# Patient Record
Sex: FEMALE | Race: WHITE | NOT HISPANIC OR LATINO | Employment: FULL TIME | URBAN - METROPOLITAN AREA
[De-identification: names, ages, dates, MRNs, and addresses within clinical notes are randomized per-mention and may not be internally consistent; named-entity substitution may affect disease eponyms.]

---

## 2017-09-12 ENCOUNTER — TRANSCRIBE ORDERS (OUTPATIENT)
Dept: ADMINISTRATIVE | Facility: HOSPITAL | Age: 52
End: 2017-09-12

## 2017-09-12 DIAGNOSIS — Z12.31 VISIT FOR SCREENING MAMMOGRAM: Primary | ICD-10-CM

## 2017-12-04 ENCOUNTER — HOSPITAL ENCOUNTER (OUTPATIENT)
Dept: MAMMOGRAPHY | Facility: HOSPITAL | Age: 52
Discharge: HOME | End: 2017-12-04
Attending: OBSTETRICS & GYNECOLOGY | Admitting: OBSTETRICS & GYNECOLOGY

## 2017-12-04 ENCOUNTER — APPOINTMENT (OUTPATIENT)
Dept: MAMMOGRAPHY | Facility: HOSPITAL | Age: 52
End: 2017-12-04
Attending: OBSTETRICS & GYNECOLOGY

## 2017-12-04 DIAGNOSIS — Z12.31 VISIT FOR SCREENING MAMMOGRAM: ICD-10-CM

## 2017-12-04 PROCEDURE — 77063 BREAST TOMOSYNTHESIS BI: CPT

## 2017-12-04 PROCEDURE — G0202 SCR MAMMO BI INCL CAD: HCPCS

## 2017-12-04 PROCEDURE — 77067 SCR MAMMO BI INCL CAD: CPT | Performed by: RADIOLOGY

## 2017-12-04 PROCEDURE — 77063 BREAST TOMOSYNTHESIS BI: CPT | Performed by: RADIOLOGY

## 2018-09-17 ENCOUNTER — PREP FOR SURGERY (OUTPATIENT)
Dept: OTHER | Facility: HOSPITAL | Age: 53
End: 2018-09-17

## 2018-09-17 DIAGNOSIS — M16.11 PRIMARY OSTEOARTHRITIS OF RIGHT HIP: Primary | ICD-10-CM

## 2018-09-17 RX ORDER — OXYCODONE HCL 10 MG/1
10 TABLET, FILM COATED, EXTENDED RELEASE ORAL ONCE
Status: CANCELLED | OUTPATIENT
Start: 2018-10-19 | End: 2018-10-19

## 2018-09-17 RX ORDER — ACETAMINOPHEN 500 MG
1000 TABLET ORAL ONCE
Status: CANCELLED | OUTPATIENT
Start: 2018-10-19 | End: 2018-10-19

## 2018-09-17 RX ORDER — CEFAZOLIN SODIUM 2 G/100ML
2 INJECTION, SOLUTION INTRAVENOUS ONCE
Status: CANCELLED | OUTPATIENT
Start: 2018-10-19 | End: 2018-10-19

## 2018-10-04 ENCOUNTER — HOSPITAL ENCOUNTER (OUTPATIENT)
Dept: GENERAL RADIOLOGY | Facility: HOSPITAL | Age: 53
End: 2018-10-04

## 2018-10-04 ENCOUNTER — HOSPITAL ENCOUNTER (OUTPATIENT)
Dept: GENERAL RADIOLOGY | Facility: HOSPITAL | Age: 53
Discharge: HOME | End: 2018-10-04
Admitting: ORTHOPAEDIC SURGERY

## 2018-10-04 ENCOUNTER — APPOINTMENT (OUTPATIENT)
Dept: PREADMISSION TESTING | Facility: HOSPITAL | Age: 53
End: 2018-10-04

## 2018-10-04 VITALS
BODY MASS INDEX: 37.51 KG/M2 | TEMPERATURE: 98.3 F | RESPIRATION RATE: 18 BRPM | WEIGHT: 239 LBS | SYSTOLIC BLOOD PRESSURE: 132 MMHG | OXYGEN SATURATION: 98 % | HEART RATE: 73 BPM | DIASTOLIC BLOOD PRESSURE: 81 MMHG | HEIGHT: 67 IN

## 2018-10-04 DIAGNOSIS — M16.11 PRIMARY OSTEOARTHRITIS OF RIGHT HIP: ICD-10-CM

## 2018-10-04 LAB
APTT PPP: 28.1 SECONDS (ref 22.7–35.4)
BACTERIA UR QL AUTO: NORMAL /HPF
BILIRUB UR QL STRIP: NEGATIVE
CLARITY UR: CLEAR
COLOR UR: YELLOW
GLUCOSE UR STRIP-MCNC: NEGATIVE MG/DL
HBA1C MFR BLD: 5.2 % (ref 4.8–5.6)
HGB UR QL STRIP.AUTO: NEGATIVE
HYALINE CASTS UR QL AUTO: NORMAL /LPF
INR PPP: 0.96 (ref 0.9–1.1)
KETONES UR QL STRIP: NEGATIVE
LEUKOCYTE ESTERASE UR QL STRIP.AUTO: ABNORMAL
NITRITE UR QL STRIP: NEGATIVE
PH UR STRIP.AUTO: 5.5 [PH] (ref 5–8)
PROT UR QL STRIP: NEGATIVE
PROTHROMBIN TIME: 12.6 SECONDS (ref 11.7–14.2)
RBC # UR: NORMAL /HPF
REF LAB TEST METHOD: NORMAL
SP GR UR STRIP: 1.02 (ref 1–1.03)
SQUAMOUS #/AREA URNS HPF: NORMAL /HPF
UROBILINOGEN UR QL STRIP: ABNORMAL
WBC UR QL AUTO: NORMAL /HPF

## 2018-10-04 PROCEDURE — 36415 COLL VENOUS BLD VENIPUNCTURE: CPT

## 2018-10-04 PROCEDURE — 85730 THROMBOPLASTIN TIME PARTIAL: CPT | Performed by: ORTHOPAEDIC SURGERY

## 2018-10-04 PROCEDURE — 83036 HEMOGLOBIN GLYCOSYLATED A1C: CPT | Performed by: ORTHOPAEDIC SURGERY

## 2018-10-04 PROCEDURE — 85610 PROTHROMBIN TIME: CPT | Performed by: ORTHOPAEDIC SURGERY

## 2018-10-04 PROCEDURE — 81001 URINALYSIS AUTO W/SCOPE: CPT | Performed by: ORTHOPAEDIC SURGERY

## 2018-10-04 PROCEDURE — 73502 X-RAY EXAM HIP UNI 2-3 VIEWS: CPT

## 2018-10-04 RX ORDER — CELECOXIB 200 MG/1
200 CAPSULE ORAL 2 TIMES DAILY
COMMUNITY
End: 2021-10-18

## 2018-10-04 RX ORDER — TRAMADOL HYDROCHLORIDE 50 MG/1
50 TABLET ORAL EVERY 6 HOURS PRN
COMMUNITY
End: 2022-03-11

## 2018-10-04 RX ORDER — CHLORHEXIDINE GLUCONATE 500 MG/1
CLOTH TOPICAL
Status: ON HOLD | COMMUNITY
End: 2018-10-19

## 2018-10-04 RX ORDER — IBUPROFEN 200 MG
200 TABLET ORAL EVERY 6 HOURS PRN
COMMUNITY
End: 2018-10-20 | Stop reason: HOSPADM

## 2018-10-04 ASSESSMENT — HOOS JR
HOOS JR SCORE: 10
HOOS JR SCORE: 58.93

## 2018-10-04 NOTE — DISCHARGE INSTRUCTIONS
Take the following medications the morning of surgery with a small sip of water:    NONE    CALL OFFICE TO CONFIRM TIME OF ARRIVAL       General Instructions:  • Do not eat solid food after midnight the night before surgery.  • You may drink clear liquids day of surgery but must stop at least one hour before your hospital arrival time.  • It is beneficial for you to have a clear drink that contains carbohydrates the day of surgery.  We suggest a 12 to 20 ounce bottle of Gatorade or Powerade for non-diabetic patients or a 12 to 20 ounce bottle of G2 or Powerade Zero for diabetic patients. (Pediatric patients, are not advised to drink a 12 to 20 ounce carbohydrate drink)    Clear liquids are liquids you can see through.  Nothing red in color.     Plain water                               Sports drinks  Sodas                                   Gelatin (Jell-O)  Fruit juices without pulp such as white grape juice and apple juice  Popsicles that contain no fruit or yogurt  Tea or coffee (no cream or milk added)  Gatorade / Powerade  G2 / Powerade Zero    • Infants may have breast milk up to four hours before surgery.  • Infants drinking formula may drink formula up to six hours before surgery.   • Patients who avoid smoking, chewing tobacco and alcohol for 4 weeks prior to surgery have a reduced risk of post-operative complications.  Quit smoking as many days before surgery as you can.  • Do not smoke, use chewing tobacco or drink alcohol the day of surgery.   • If applicable bring your C-PAP/ BI-PAP machine.  • Bring any papers given to you in the doctor’s office.  • Wear clean comfortable clothes and socks.  • Do not wear contact lenses or make-up.  Bring a case for your glasses.   • Bring crutches or walker if applicable.  • Remove all piercings.  Leave jewelry and any other valuables at home.  • Hair extensions with metal clips must be removed prior to surgery.  • The Pre-Admission Testing nurse will instruct you to  bring medications if unable to obtain an accurate list in Pre-Admission Testing.        If you were given a blood bank ID arm band remember to bring it with you the day of surgery.    Preventing a Surgical Site Infection:  • For 2 to 3 days before surgery, avoid shaving with a razor because the razor can irritate skin and make it easier to develop an infection.    • Any areas of open skin can increase the risk of a post-operative wound infection by allowing bacteria to enter and travel throughout the body.  Notify your surgeon if you have any skin wounds / rashes even if it is not near the expected surgical site.  The area will need assessed to determine if surgery should be delayed until it is healed.  • The night prior to surgery sleep in a clean bed with clean clothing.  Do not allow pets to sleep with you.  • Shower on the morning of surgery using a fresh bar of anti-bacterial soap (such as Dial) and clean washcloth.  Dry with a clean towel and dress in clean clothing.  • Ask your surgeon if you will be receiving antibiotics prior to surgery.  • Make sure you, your family, and all healthcare providers clean their hands with soap and water or an alcohol based hand  before caring for you or your wound.    Day of surgery:  Upon arrival, a Pre-op nurse and Anesthesiologist will review your health history, obtain vital signs, and answer questions you may have.  The only belongings needed at this time will be your home medications and if applicable your C-PAP/BI-PAP machine.  If you are staying overnight your family can leave the rest of your belongings in the car and bring them to your room later.  A Pre-op nurse will start an IV and you may receive medication in preparation for surgery, including something to help you relax.  Your family will be able to see you in the Pre-op area.  While you are in surgery your family should notify the waiting room  if they leave the waiting room area and  provide a contact phone number.    Please be aware that surgery does come with discomfort.  We want to make every effort to control your discomfort so please discuss any uncontrolled symptoms with your nurse.   Your doctor will most likely have prescribed pain medications.      If you are going home after surgery you will receive individualized written care instructions before being discharged.  A responsible adult must drive you to and from the hospital on the day of your surgery and stay with you for 24 hours.    If you are staying overnight following surgery, you will be transported to your hospital room following the recovery period.  [facility] has all private rooms.    You have received a list of surgical assistants for your reference.  If you have any questions please call Pre-Admission Testing at [phone].  Deductibles and co-payments are collected on the day of service. Please be prepared to pay the required co-pay, deductible or deposit on the day of service as defined by your plan.    2% CHLORAHEXIDINE GLUCONATE* CLOTH  Preparing or “prepping” skin before surgery can reduce the risk of infection at the surgical site. To make the process easier, [facility] has chosen disposable cloths moistened with a rinse-free, 2% Chlorhexidine Gluconate (CHG) antiseptic solution. The steps below outline the prepping process and should be carefully followed.        Use the prep cloth on the area that is circled in the diagram             Directions Night before Surgery  1) Shower using a fresh bar of anti-bacterial soap (such as Dial) and clean washcloth.  Use a clean towel to completely dry your skin.  2) Do not use any lotions, oils or creams on your skin.  3) Open the package and remove 1 cloth, wipe your skin for 30 seconds in a circular motion.  Allow to dry for 3 minutes.  4) Repeat #3 with second cloth.  5) Do not touch your eyes, ears, or mouth with the prep cloth.  6) Allow the wet  prep solution to air dry.  7) Discard the prep cloth and wash your hands with soap and water.   8) Dress in clean bed clothes and sleep on fresh clean bed sheets.   9) You may experience some temporary itching after the prep.    Directions Day of Surgery  1) Repeat steps 1,2,3,4,5,6,7, and 9.   2) Dress in clean clothes before coming to the hospital.    BACTROBAN NASAL OINTMENT  There are many germs normally in your nose. Bactroban is an ointment that will help reduce these germs. Please follow these instructions for Bactroban use:      ____The day before surgery in the morning  Date________    ____The day before surgery in the evening              Date________    ____The day of surgery in the morning    Date________    **Squirt ½ package of Bactroban Ointment onto a cotton applicator and apply to inside of 1st nostril.  Squirt the remaining Bactroban and apply to the inside of the other nostril.    PERIDEX- ORAL:  Use only if your surgeon has ordered  Use the night before and morning of surgery - Swish, gargle, and spit - do not swallow.

## 2018-10-05 NOTE — PROGRESS NOTES
Pre-Operative Orthopedic Assessment      Patient: Grace Jacob    YOB: 1965      Age/Gender: 53 y.o. female  Medical Record Number: [id number]  Surgical Procedure Planned: RT TOTAL HIP ARTHROPLASTY ANTERIOR WITH HANA TABLE     Surgeon: Mary Be MD    Date of Surgery Planned: 10/19/2018    Question Value Score    Patient Score   1. What is your age group? 50-65 years  66-75 years  >75 years =2  =1  =0 2   2. Gender? Male  Female =2  =1 1   3. How far on average can you walk? (a block is 200 meters) Two blocks or more (+\- rest)  1-2 blocks (+\- rest)  Housebound (most of time) =2  =1  =0 2   4. Which gait aid to you use? (more often than not) None  Single-Point Stick  Crutches/Frame =2  =1  =0 2   5. Do you use community  supports? (home help, meals on wheels, district nursing) None or one per week  Two or more per week =1  =0 1   6. Will you live with someone who can care for you after your operation? Yes  No =3  =0 3      Your Score (out of 12)  11     Key Destination at Discharge from acute care predicted by score   Scores < 6  -- extended inpatient rehabilitation   Scores 6-9 -- additional intervention to discharge directly home (Rehabilitation in home)   Scores > 9 -- directly home         Discharge Disposition/Planning:     Patient Response   Discussed the Predicted discharge disposition needed based on RAPT Assessment with the patient.    yes   Patient selected discharge disposition:   Home with OP therapy   Out Patient Rehabilitation Facility of Choice:    Either [facility] or [facility] Preferred:   undecided   Has the patient completed or been scheduled to complete pre-operative testing? Yes, 10/4   Post-Operative Care Giver Name and Phone Number:    Spouse Ant Jacob, [phone]     Subacute Inpatient Rehabilitation:  Complete this section only if planning inpatient services at a Subacute Facility     Patient Response    Subacute Facility Preferred (Please list 2 facilities:      Requires pre-certification for inpatient rehabilitation services?         Planned source of transportation to inpatient rehabilitation facility?       If choosing inpatient services at an Acute or Subacute Facility please list a subsequent back-up plan (in case patient fails to qualify for inpatient rehabilitation). Back-up plans should include caregiver (family member or friend) for first 24-48 post- -operatively.       Home Equipment Patient Response   Does patient have a walker for home use?    yes   Does patient have a 3 in 1 commode for home use?    no   Does patient have a shower chair for home use?    no   Does patient have an elevated commode seat for home use? no   Does patient have a cane for home use?    yes   Is there any other medical equipment in the home? If so,  List in comment section below no   Pre-Operative Class Attendance Patient Response   Attended or scheduled to attend the pre-operative class within 1 year of total joint replacement? Yes, 10/4   Not planning to attend the pre-operative class (see comments below)    n/a   Patient Education  Completed   Expected time of discharge discussed yes   Encouraged to attend Pre-Operative Class    n/a   Education re: Back-up plan for patients planning discharge to subacute facilities n/a   Education re: Predicted Discharge Disposition based on RAPT score    yes   Patient receptive and voiced understanding of information given    yes                                                                                                            Comments:    Address verified.  Pt would like a 3-in-1 ordered please.  Pt has 5 steps with one rail into her home.                                       Signature: Rehana Pedro RN    Date:  10/5/2018

## 2018-10-15 ENCOUNTER — TRANSCRIBE ORDERS (OUTPATIENT)
Dept: ADMINISTRATIVE | Facility: HOSPITAL | Age: 53
End: 2018-10-15

## 2018-10-15 DIAGNOSIS — N64.52 NIPPLE DISCHARGE IN FEMALE: Primary | ICD-10-CM

## 2018-10-16 ENCOUNTER — HOSPITAL ENCOUNTER (OUTPATIENT)
Dept: MAMMOGRAPHY | Facility: HOSPITAL | Age: 53
Discharge: HOME | End: 2018-10-16
Attending: OBSTETRICS & GYNECOLOGY | Admitting: OBSTETRICS & GYNECOLOGY

## 2018-10-16 ENCOUNTER — HOSPITAL ENCOUNTER (OUTPATIENT)
Dept: ULTRASOUND IMAGING | Facility: HOSPITAL | Age: 53
Discharge: HOME | End: 2018-10-16

## 2018-10-16 DIAGNOSIS — N64.52 NIPPLE DISCHARGE IN FEMALE: ICD-10-CM

## 2018-10-16 PROCEDURE — G0279 TOMOSYNTHESIS, MAMMO: HCPCS

## 2018-10-16 PROCEDURE — 77065 DX MAMMO INCL CAD UNI: CPT | Performed by: RADIOLOGY

## 2018-10-16 PROCEDURE — 77061 BREAST TOMOSYNTHESIS UNI: CPT | Performed by: RADIOLOGY

## 2018-10-16 PROCEDURE — 76642 ULTRASOUND BREAST LIMITED: CPT | Performed by: RADIOLOGY

## 2018-10-16 PROCEDURE — 76642 ULTRASOUND BREAST LIMITED: CPT

## 2018-10-16 PROCEDURE — 77065 DX MAMMO INCL CAD UNI: CPT

## 2018-10-19 ENCOUNTER — ANESTHESIA EVENT (OUTPATIENT)
Dept: PERIOP | Facility: HOSPITAL | Age: 53
End: 2018-10-19

## 2018-10-19 ENCOUNTER — HOSPITAL ENCOUNTER (INPATIENT)
Facility: HOSPITAL | Age: 53
LOS: 1 days | Discharge: HOME HEALTH | End: 2018-10-20
Attending: ORTHOPAEDIC SURGERY | Admitting: ORTHOPAEDIC SURGERY

## 2018-10-19 ENCOUNTER — APPOINTMENT (OUTPATIENT)
Dept: GENERAL RADIOLOGY | Facility: HOSPITAL | Age: 53
End: 2018-10-19

## 2018-10-19 ENCOUNTER — ANESTHESIA (OUTPATIENT)
Dept: PERIOP | Facility: HOSPITAL | Age: 53
End: 2018-10-19

## 2018-10-19 DIAGNOSIS — R26.2 DIFFICULTY WALKING: Primary | ICD-10-CM

## 2018-10-19 DIAGNOSIS — M16.11 PRIMARY OSTEOARTHRITIS OF RIGHT HIP: ICD-10-CM

## 2018-10-19 PROBLEM — R11.2 POSTOPERATIVE NAUSEA AND VOMITING: Status: ACTIVE | Noted: 2018-10-19

## 2018-10-19 PROBLEM — M19.90 ARTHRITIS: Status: ACTIVE | Noted: 2018-10-19

## 2018-10-19 PROBLEM — F41.9 ANXIETY: Status: ACTIVE | Noted: 2018-10-19

## 2018-10-19 PROBLEM — E66.9 OBESITY (BMI 30-39.9): Status: ACTIVE | Noted: 2018-10-19

## 2018-10-19 PROBLEM — Z98.890 POSTOPERATIVE NAUSEA AND VOMITING: Status: ACTIVE | Noted: 2018-10-19

## 2018-10-19 PROCEDURE — 25010000002 FENTANYL CITRATE (PF) 100 MCG/2ML SOLUTION

## 2018-10-19 PROCEDURE — 25010000002 KETOROLAC TROMETHAMINE PER 15 MG: Performed by: ORTHOPAEDIC SURGERY

## 2018-10-19 PROCEDURE — C1776 JOINT DEVICE (IMPLANTABLE): HCPCS | Performed by: ORTHOPAEDIC SURGERY

## 2018-10-19 PROCEDURE — 25010000002 MIDAZOLAM PER 1 MG: Performed by: ANESTHESIOLOGY

## 2018-10-19 PROCEDURE — 25010000002 HYDROMORPHONE PER 4 MG: Performed by: NURSE ANESTHETIST, CERTIFIED REGISTERED

## 2018-10-19 PROCEDURE — 25010000003 CEFAZOLIN IN DEXTROSE 2-4 GM/100ML-% SOLUTION: Performed by: ORTHOPAEDIC SURGERY

## 2018-10-19 PROCEDURE — 25010000002 CLONIDINE PER 1 MG: Performed by: ORTHOPAEDIC SURGERY

## 2018-10-19 PROCEDURE — 73501 X-RAY EXAM HIP UNI 1 VIEW: CPT

## 2018-10-19 PROCEDURE — 97110 THERAPEUTIC EXERCISES: CPT

## 2018-10-19 PROCEDURE — 25010000002 ROPIVACAINE PER 1 MG: Performed by: ORTHOPAEDIC SURGERY

## 2018-10-19 PROCEDURE — 25010000002 DEXAMETHASONE PER 1 MG: Performed by: NURSE ANESTHETIST, CERTIFIED REGISTERED

## 2018-10-19 PROCEDURE — 25010000002 PROMETHAZINE PER 50 MG: Performed by: ORTHOPAEDIC SURGERY

## 2018-10-19 PROCEDURE — 97161 PT EVAL LOW COMPLEX 20 MIN: CPT

## 2018-10-19 PROCEDURE — 25010000002 ONDANSETRON PER 1 MG: Performed by: NURSE ANESTHETIST, CERTIFIED REGISTERED

## 2018-10-19 PROCEDURE — 76000 FLUOROSCOPY <1 HR PHYS/QHP: CPT

## 2018-10-19 PROCEDURE — 25010000002 PROPOFOL 10 MG/ML EMULSION: Performed by: NURSE ANESTHETIST, CERTIFIED REGISTERED

## 2018-10-19 PROCEDURE — 25010000002 HYDROMORPHONE 1 MG/ML SOLUTION: Performed by: ORTHOPAEDIC SURGERY

## 2018-10-19 PROCEDURE — 0SR904Z REPLACEMENT OF RIGHT HIP JOINT WITH CERAMIC ON POLYETHYLENE SYNTHETIC SUBSTITUTE, OPEN APPROACH: ICD-10-PCS | Performed by: ORTHOPAEDIC SURGERY

## 2018-10-19 PROCEDURE — 25010000002 VANCOMYCIN 10 G RECONSTITUTED SOLUTION: Performed by: ORTHOPAEDIC SURGERY

## 2018-10-19 PROCEDURE — 25010000002 FENTANYL CITRATE (PF) 100 MCG/2ML SOLUTION: Performed by: NURSE ANESTHETIST, CERTIFIED REGISTERED

## 2018-10-19 PROCEDURE — 25010000002 FENTANYL CITRATE (PF) 100 MCG/2ML SOLUTION: Performed by: ANESTHESIOLOGY

## 2018-10-19 DEVICE — PINNACLE GRIPTION ACETABULAR SHELL SECTOR 50MM OD
Type: IMPLANTABLE DEVICE | Site: HIP | Status: FUNCTIONAL
Brand: PINNACLE GRIPTION

## 2018-10-19 DEVICE — TOTL HIP GRIPTION CUP DEPUY UPCHRG: Type: IMPLANTABLE DEVICE | Site: HIP | Status: FUNCTIONAL

## 2018-10-19 DEVICE — TOTL HIP STEM DEPUY UPCHRG: Type: IMPLANTABLE DEVICE | Site: HIP | Status: FUNCTIONAL

## 2018-10-19 DEVICE — SUT FW #2 W/TPR NDL 1/2 CIR 38IN 97CM 26.5MM BLU: Type: IMPLANTABLE DEVICE | Site: HIP | Status: FUNCTIONAL

## 2018-10-19 DEVICE — CORAIL HIP SYSTEM CEMENTLESS FEMORAL STEM 12/14 AMT 135 DEGREES KHO SIZE 9 HA COATED HIGH OFFSET NO COLLAR
Type: IMPLANTABLE DEVICE | Site: HIP | Status: FUNCTIONAL
Brand: CORAIL

## 2018-10-19 DEVICE — PINNACLE HIP SOLUTIONS ALTRX POLYETHYLENE ACETABULAR LINER NEUTRAL 32MM ID 50MM OD
Type: IMPLANTABLE DEVICE | Site: HIP | Status: FUNCTIONAL
Brand: PINNACLE ALTRX

## 2018-10-19 DEVICE — TOTL HIP COA DEPUY 9641334: Type: IMPLANTABLE DEVICE | Site: HIP | Status: FUNCTIONAL

## 2018-10-19 DEVICE — BIOLOX DELTA CERAMIC FEMORAL HEAD 32MM DIA +5.0 12/14 TAPER
Type: IMPLANTABLE DEVICE | Site: HIP | Status: FUNCTIONAL
Brand: BIOLOX DELTA

## 2018-10-19 RX ORDER — HYDROCODONE BITARTRATE AND ACETAMINOPHEN 7.5; 325 MG/1; MG/1
2 TABLET ORAL EVERY 4 HOURS PRN
Status: DISCONTINUED | OUTPATIENT
Start: 2018-10-19 | End: 2018-10-20 | Stop reason: HOSPADM

## 2018-10-19 RX ORDER — UREA 10 %
1 LOTION (ML) TOPICAL NIGHTLY PRN
Status: DISCONTINUED | OUTPATIENT
Start: 2018-10-19 | End: 2018-10-20 | Stop reason: HOSPADM

## 2018-10-19 RX ORDER — SCOLOPAMINE TRANSDERMAL SYSTEM 1 MG/1
1 PATCH, EXTENDED RELEASE TRANSDERMAL ONCE
Status: DISCONTINUED | OUTPATIENT
Start: 2018-10-19 | End: 2018-10-20

## 2018-10-19 RX ORDER — HYDROCODONE BITARTRATE AND ACETAMINOPHEN 7.5; 325 MG/1; MG/1
1 TABLET ORAL EVERY 4 HOURS PRN
Status: DISCONTINUED | OUTPATIENT
Start: 2018-10-19 | End: 2018-10-20 | Stop reason: HOSPADM

## 2018-10-19 RX ORDER — OXYCODONE AND ACETAMINOPHEN 7.5; 325 MG/1; MG/1
1 TABLET ORAL ONCE AS NEEDED
Status: DISCONTINUED | OUTPATIENT
Start: 2018-10-19 | End: 2018-10-19 | Stop reason: HOSPADM

## 2018-10-19 RX ORDER — PROMETHAZINE HYDROCHLORIDE 25 MG/1
25 SUPPOSITORY RECTAL ONCE AS NEEDED
Status: DISCONTINUED | OUTPATIENT
Start: 2018-10-19 | End: 2018-10-19 | Stop reason: HOSPADM

## 2018-10-19 RX ORDER — HYDROCODONE BITARTRATE AND ACETAMINOPHEN 7.5; 325 MG/1; MG/1
1 TABLET ORAL ONCE AS NEEDED
Status: DISCONTINUED | OUTPATIENT
Start: 2018-10-19 | End: 2018-10-19 | Stop reason: HOSPADM

## 2018-10-19 RX ORDER — PROMETHAZINE HYDROCHLORIDE 25 MG/1
12.5 TABLET ORAL ONCE AS NEEDED
Status: DISCONTINUED | OUTPATIENT
Start: 2018-10-19 | End: 2018-10-19 | Stop reason: HOSPADM

## 2018-10-19 RX ORDER — LIDOCAINE HYDROCHLORIDE 10 MG/ML
0.5 INJECTION, SOLUTION EPIDURAL; INFILTRATION; INTRACAUDAL; PERINEURAL ONCE AS NEEDED
Status: DISCONTINUED | OUTPATIENT
Start: 2018-10-19 | End: 2018-10-19 | Stop reason: HOSPADM

## 2018-10-19 RX ORDER — LABETALOL HYDROCHLORIDE 5 MG/ML
5 INJECTION, SOLUTION INTRAVENOUS
Status: DISCONTINUED | OUTPATIENT
Start: 2018-10-19 | End: 2018-10-19 | Stop reason: HOSPADM

## 2018-10-19 RX ORDER — ROCURONIUM BROMIDE 10 MG/ML
INJECTION, SOLUTION INTRAVENOUS AS NEEDED
Status: DISCONTINUED | OUTPATIENT
Start: 2018-10-19 | End: 2018-10-19 | Stop reason: SURG

## 2018-10-19 RX ORDER — CEFAZOLIN SODIUM 2 G/100ML
2 INJECTION, SOLUTION INTRAVENOUS ONCE
Status: COMPLETED | OUTPATIENT
Start: 2018-10-19 | End: 2018-10-19

## 2018-10-19 RX ORDER — PROMETHAZINE HYDROCHLORIDE 25 MG/ML
12.5 INJECTION, SOLUTION INTRAMUSCULAR; INTRAVENOUS ONCE
Status: DISCONTINUED | OUTPATIENT
Start: 2018-10-19 | End: 2018-10-19 | Stop reason: HOSPADM

## 2018-10-19 RX ORDER — FENTANYL CITRATE 50 UG/ML
50 INJECTION, SOLUTION INTRAMUSCULAR; INTRAVENOUS
Status: DISCONTINUED | OUTPATIENT
Start: 2018-10-19 | End: 2018-10-19 | Stop reason: HOSPADM

## 2018-10-19 RX ORDER — ONDANSETRON 2 MG/ML
INJECTION INTRAMUSCULAR; INTRAVENOUS AS NEEDED
Status: DISCONTINUED | OUTPATIENT
Start: 2018-10-19 | End: 2018-10-19 | Stop reason: SURG

## 2018-10-19 RX ORDER — EPHEDRINE SULFATE 50 MG/ML
5 INJECTION, SOLUTION INTRAVENOUS ONCE AS NEEDED
Status: DISCONTINUED | OUTPATIENT
Start: 2018-10-19 | End: 2018-10-19 | Stop reason: HOSPADM

## 2018-10-19 RX ORDER — NALOXONE HCL 0.4 MG/ML
0.2 VIAL (ML) INJECTION AS NEEDED
Status: DISCONTINUED | OUTPATIENT
Start: 2018-10-19 | End: 2018-10-19 | Stop reason: HOSPADM

## 2018-10-19 RX ORDER — SODIUM CHLORIDE 0.9 % (FLUSH) 0.9 %
3 SYRINGE (ML) INJECTION EVERY 12 HOURS SCHEDULED
Status: DISCONTINUED | OUTPATIENT
Start: 2018-10-19 | End: 2018-10-19 | Stop reason: HOSPADM

## 2018-10-19 RX ORDER — FENTANYL CITRATE 50 UG/ML
INJECTION, SOLUTION INTRAMUSCULAR; INTRAVENOUS
Status: COMPLETED
Start: 2018-10-19 | End: 2018-10-19

## 2018-10-19 RX ORDER — FENTANYL CITRATE 50 UG/ML
100 INJECTION, SOLUTION INTRAMUSCULAR; INTRAVENOUS
Status: DISCONTINUED | OUTPATIENT
Start: 2018-10-19 | End: 2018-10-19 | Stop reason: HOSPADM

## 2018-10-19 RX ORDER — DEXAMETHASONE SODIUM PHOSPHATE 10 MG/ML
INJECTION INTRAMUSCULAR; INTRAVENOUS AS NEEDED
Status: DISCONTINUED | OUTPATIENT
Start: 2018-10-19 | End: 2018-10-19 | Stop reason: SURG

## 2018-10-19 RX ORDER — SODIUM CHLORIDE 0.9 % (FLUSH) 0.9 %
1-10 SYRINGE (ML) INJECTION AS NEEDED
Status: DISCONTINUED | OUTPATIENT
Start: 2018-10-19 | End: 2018-10-19 | Stop reason: HOSPADM

## 2018-10-19 RX ORDER — NALOXONE HCL 0.4 MG/ML
0.1 VIAL (ML) INJECTION
Status: DISCONTINUED | OUTPATIENT
Start: 2018-10-19 | End: 2018-10-20 | Stop reason: HOSPADM

## 2018-10-19 RX ORDER — OXYCODONE HCL 10 MG/1
10 TABLET, FILM COATED, EXTENDED RELEASE ORAL ONCE
Status: COMPLETED | OUTPATIENT
Start: 2018-10-19 | End: 2018-10-19

## 2018-10-19 RX ORDER — LIDOCAINE HYDROCHLORIDE 20 MG/ML
INJECTION, SOLUTION INFILTRATION; PERINEURAL AS NEEDED
Status: DISCONTINUED | OUTPATIENT
Start: 2018-10-19 | End: 2018-10-19 | Stop reason: SURG

## 2018-10-19 RX ORDER — MEPERIDINE HYDROCHLORIDE 25 MG/ML
12.5 INJECTION INTRAMUSCULAR; INTRAVENOUS; SUBCUTANEOUS
Status: DISCONTINUED | OUTPATIENT
Start: 2018-10-19 | End: 2018-10-19 | Stop reason: HOSPADM

## 2018-10-19 RX ORDER — ONDANSETRON 2 MG/ML
4 INJECTION INTRAMUSCULAR; INTRAVENOUS EVERY 6 HOURS PRN
Status: DISCONTINUED | OUTPATIENT
Start: 2018-10-19 | End: 2018-10-20 | Stop reason: HOSPADM

## 2018-10-19 RX ORDER — MIDAZOLAM HYDROCHLORIDE 1 MG/ML
2 INJECTION INTRAMUSCULAR; INTRAVENOUS
Status: DISCONTINUED | OUTPATIENT
Start: 2018-10-19 | End: 2018-10-19 | Stop reason: HOSPADM

## 2018-10-19 RX ORDER — BISACODYL 5 MG/1
10 TABLET, DELAYED RELEASE ORAL DAILY PRN
Status: DISCONTINUED | OUTPATIENT
Start: 2018-10-19 | End: 2018-10-20 | Stop reason: HOSPADM

## 2018-10-19 RX ORDER — ONDANSETRON 2 MG/ML
4 INJECTION INTRAMUSCULAR; INTRAVENOUS ONCE AS NEEDED
Status: COMPLETED | OUTPATIENT
Start: 2018-10-19 | End: 2018-10-19

## 2018-10-19 RX ORDER — PROMETHAZINE HYDROCHLORIDE 25 MG/ML
12.5 INJECTION, SOLUTION INTRAMUSCULAR; INTRAVENOUS ONCE AS NEEDED
Status: DISCONTINUED | OUTPATIENT
Start: 2018-10-19 | End: 2018-10-19 | Stop reason: HOSPADM

## 2018-10-19 RX ORDER — DOCUSATE SODIUM 100 MG/1
100 CAPSULE, LIQUID FILLED ORAL 2 TIMES DAILY PRN
Status: DISCONTINUED | OUTPATIENT
Start: 2018-10-19 | End: 2018-10-20 | Stop reason: HOSPADM

## 2018-10-19 RX ORDER — PROMETHAZINE HYDROCHLORIDE 25 MG/ML
25 INJECTION, SOLUTION INTRAMUSCULAR; INTRAVENOUS EVERY 8 HOURS PRN
Status: DISCONTINUED | OUTPATIENT
Start: 2018-10-19 | End: 2018-10-20 | Stop reason: HOSPADM

## 2018-10-19 RX ORDER — PROPOFOL 10 MG/ML
VIAL (ML) INTRAVENOUS AS NEEDED
Status: DISCONTINUED | OUTPATIENT
Start: 2018-10-19 | End: 2018-10-19 | Stop reason: SURG

## 2018-10-19 RX ORDER — FAMOTIDINE 10 MG/ML
20 INJECTION, SOLUTION INTRAVENOUS ONCE
Status: COMPLETED | OUTPATIENT
Start: 2018-10-19 | End: 2018-10-19

## 2018-10-19 RX ORDER — BISACODYL 10 MG
10 SUPPOSITORY, RECTAL RECTAL DAILY PRN
Status: DISCONTINUED | OUTPATIENT
Start: 2018-10-19 | End: 2018-10-20 | Stop reason: HOSPADM

## 2018-10-19 RX ORDER — CEFAZOLIN SODIUM 2 G/100ML
2 INJECTION, SOLUTION INTRAVENOUS EVERY 8 HOURS
Status: COMPLETED | OUTPATIENT
Start: 2018-10-19 | End: 2018-10-20

## 2018-10-19 RX ORDER — ONDANSETRON 4 MG/1
4 TABLET, FILM COATED ORAL EVERY 6 HOURS PRN
Status: DISCONTINUED | OUTPATIENT
Start: 2018-10-19 | End: 2018-10-20 | Stop reason: HOSPADM

## 2018-10-19 RX ORDER — PROMETHAZINE HYDROCHLORIDE 25 MG/1
25 TABLET ORAL ONCE AS NEEDED
Status: DISCONTINUED | OUTPATIENT
Start: 2018-10-19 | End: 2018-10-19 | Stop reason: HOSPADM

## 2018-10-19 RX ORDER — HYDROMORPHONE HYDROCHLORIDE 1 MG/ML
0.5 INJECTION, SOLUTION INTRAMUSCULAR; INTRAVENOUS; SUBCUTANEOUS
Status: DISCONTINUED | OUTPATIENT
Start: 2018-10-19 | End: 2018-10-19 | Stop reason: HOSPADM

## 2018-10-19 RX ORDER — CELECOXIB 200 MG/1
200 CAPSULE ORAL 2 TIMES DAILY
Status: DISCONTINUED | OUTPATIENT
Start: 2018-10-19 | End: 2018-10-20 | Stop reason: HOSPADM

## 2018-10-19 RX ORDER — ONDANSETRON 4 MG/1
4 TABLET, ORALLY DISINTEGRATING ORAL EVERY 6 HOURS PRN
Status: DISCONTINUED | OUTPATIENT
Start: 2018-10-19 | End: 2018-10-20 | Stop reason: HOSPADM

## 2018-10-19 RX ORDER — ASPIRIN 325 MG
325 TABLET, DELAYED RELEASE (ENTERIC COATED) ORAL EVERY 12 HOURS SCHEDULED
Status: DISCONTINUED | OUTPATIENT
Start: 2018-10-20 | End: 2018-10-20 | Stop reason: HOSPADM

## 2018-10-19 RX ORDER — TRANEXAMIC ACID 100 MG/ML
INJECTION, SOLUTION INTRAVENOUS AS NEEDED
Status: DISCONTINUED | OUTPATIENT
Start: 2018-10-19 | End: 2018-10-19 | Stop reason: SURG

## 2018-10-19 RX ORDER — SODIUM CHLORIDE, SODIUM LACTATE, POTASSIUM CHLORIDE, CALCIUM CHLORIDE 600; 310; 30; 20 MG/100ML; MG/100ML; MG/100ML; MG/100ML
9 INJECTION, SOLUTION INTRAVENOUS CONTINUOUS
Status: DISCONTINUED | OUTPATIENT
Start: 2018-10-19 | End: 2018-10-19 | Stop reason: HOSPADM

## 2018-10-19 RX ORDER — ACETAMINOPHEN 500 MG
1000 TABLET ORAL ONCE
Status: COMPLETED | OUTPATIENT
Start: 2018-10-19 | End: 2018-10-19

## 2018-10-19 RX ORDER — FLUMAZENIL 0.1 MG/ML
0.2 INJECTION INTRAVENOUS AS NEEDED
Status: DISCONTINUED | OUTPATIENT
Start: 2018-10-19 | End: 2018-10-19 | Stop reason: HOSPADM

## 2018-10-19 RX ORDER — MIDAZOLAM HYDROCHLORIDE 1 MG/ML
1 INJECTION INTRAMUSCULAR; INTRAVENOUS
Status: DISCONTINUED | OUTPATIENT
Start: 2018-10-19 | End: 2018-10-19 | Stop reason: HOSPADM

## 2018-10-19 RX ORDER — SODIUM CHLORIDE 9 MG/ML
100 INJECTION, SOLUTION INTRAVENOUS CONTINUOUS
Status: DISCONTINUED | OUTPATIENT
Start: 2018-10-19 | End: 2018-10-20 | Stop reason: HOSPADM

## 2018-10-19 RX ADMIN — PROPOFOL 150 MG: 10 INJECTION, EMULSION INTRAVENOUS at 10:02

## 2018-10-19 RX ADMIN — HYDROMORPHONE HYDROCHLORIDE 0.5 MG: 1 INJECTION, SOLUTION INTRAMUSCULAR; INTRAVENOUS; SUBCUTANEOUS at 13:15

## 2018-10-19 RX ADMIN — SCOPOLAMINE 1 PATCH: 1 PATCH, EXTENDED RELEASE TRANSDERMAL at 07:50

## 2018-10-19 RX ADMIN — HYDROMORPHONE HYDROCHLORIDE 0.5 MG: 1 INJECTION, SOLUTION INTRAMUSCULAR; INTRAVENOUS; SUBCUTANEOUS at 15:21

## 2018-10-19 RX ADMIN — FENTANYL CITRATE 50 MCG: 50 INJECTION, SOLUTION INTRAMUSCULAR; INTRAVENOUS at 12:50

## 2018-10-19 RX ADMIN — FENTANYL CITRATE 50 MCG: 50 INJECTION, SOLUTION INTRAMUSCULAR; INTRAVENOUS at 13:03

## 2018-10-19 RX ADMIN — CEFAZOLIN SODIUM 2 G: 2 INJECTION, SOLUTION INTRAVENOUS at 10:10

## 2018-10-19 RX ADMIN — PROMETHAZINE HYDROCHLORIDE 25 MG: 25 INJECTION, SOLUTION INTRAMUSCULAR; INTRAVENOUS at 16:33

## 2018-10-19 RX ADMIN — HYDROCODONE BITARTRATE AND ACETAMINOPHEN 2 TABLET: 7.5; 325 TABLET ORAL at 21:51

## 2018-10-19 RX ADMIN — VANCOMYCIN HYDROCHLORIDE 1250 MG: 10 INJECTION, POWDER, LYOPHILIZED, FOR SOLUTION INTRAVENOUS at 07:18

## 2018-10-19 RX ADMIN — CELECOXIB 200 MG: 200 CAPSULE ORAL at 21:28

## 2018-10-19 RX ADMIN — SODIUM CHLORIDE, POTASSIUM CHLORIDE, SODIUM LACTATE AND CALCIUM CHLORIDE 9 ML/HR: 600; 310; 30; 20 INJECTION, SOLUTION INTRAVENOUS at 07:50

## 2018-10-19 RX ADMIN — ROCURONIUM BROMIDE 50 MG: 10 INJECTION INTRAVENOUS at 10:02

## 2018-10-19 RX ADMIN — ONDANSETRON 4 MG: 2 INJECTION INTRAMUSCULAR; INTRAVENOUS at 13:29

## 2018-10-19 RX ADMIN — TRANEXAMIC ACID 1000 MG: 100 INJECTION, SOLUTION INTRAVENOUS at 10:28

## 2018-10-19 RX ADMIN — ONDANSETRON 4 MG: 2 INJECTION INTRAMUSCULAR; INTRAVENOUS at 10:30

## 2018-10-19 RX ADMIN — FENTANYL CITRATE 100 MCG: 50 INJECTION INTRAMUSCULAR; INTRAVENOUS at 10:01

## 2018-10-19 RX ADMIN — SERTRALINE 50 MG: 50 TABLET, FILM COATED ORAL at 21:28

## 2018-10-19 RX ADMIN — SODIUM CHLORIDE 100 ML/HR: 9 INJECTION, SOLUTION INTRAVENOUS at 15:21

## 2018-10-19 RX ADMIN — SUGAMMADEX 200 MG: 100 INJECTION, SOLUTION INTRAVENOUS at 12:12

## 2018-10-19 RX ADMIN — CEFAZOLIN SODIUM 2 G: 2 INJECTION, SOLUTION INTRAVENOUS at 18:06

## 2018-10-19 RX ADMIN — LIDOCAINE HYDROCHLORIDE 60 MG: 20 INJECTION, SOLUTION INFILTRATION; PERINEURAL at 10:02

## 2018-10-19 RX ADMIN — HYDROMORPHONE HYDROCHLORIDE 0.5 MG: 1 INJECTION, SOLUTION INTRAMUSCULAR; INTRAVENOUS; SUBCUTANEOUS at 13:02

## 2018-10-19 RX ADMIN — OXYCODONE HYDROCHLORIDE 10 MG: 10 TABLET, FILM COATED, EXTENDED RELEASE ORAL at 07:18

## 2018-10-19 RX ADMIN — FAMOTIDINE 20 MG: 10 INJECTION, SOLUTION INTRAVENOUS at 07:50

## 2018-10-19 RX ADMIN — MIDAZOLAM HYDROCHLORIDE 1 MG: 2 INJECTION, SOLUTION INTRAMUSCULAR; INTRAVENOUS at 07:51

## 2018-10-19 RX ADMIN — FENTANYL CITRATE 50 MCG: 50 INJECTION, SOLUTION INTRAMUSCULAR; INTRAVENOUS at 12:43

## 2018-10-19 RX ADMIN — ACETAMINOPHEN 1000 MG: 500 TABLET, FILM COATED ORAL at 07:18

## 2018-10-19 RX ADMIN — ROCURONIUM BROMIDE 20 MG: 10 INJECTION INTRAVENOUS at 10:57

## 2018-10-19 RX ADMIN — SODIUM CHLORIDE, POTASSIUM CHLORIDE, SODIUM LACTATE AND CALCIUM CHLORIDE: 600; 310; 30; 20 INJECTION, SOLUTION INTRAVENOUS at 10:57

## 2018-10-19 RX ADMIN — DEXAMETHASONE SODIUM PHOSPHATE 6 MG: 10 INJECTION INTRAMUSCULAR; INTRAVENOUS at 10:30

## 2018-10-19 RX ADMIN — FENTANYL CITRATE 100 MCG: 50 INJECTION INTRAMUSCULAR; INTRAVENOUS at 10:36

## 2018-10-19 NOTE — ANESTHESIA PREPROCEDURE EVALUATION
Anesthesia Evaluation     Patient summary reviewed and Nursing notes reviewed   history of anesthetic complications: PONV  NPO Solid Status: > 8 hours             Airway   Mallampati: II  TM distance: >3 FB  Neck ROM: full  no difficulty expected  Dental - normal exam     Pulmonary - negative pulmonary ROS and normal exam   Cardiovascular - negative cardio ROS and normal exam        Neuro/Psych  (+) psychiatric history,     GI/Hepatic/Renal/Endo    (+) obesity,       Musculoskeletal (-) negative ROS    Abdominal  - normal exam   Substance History - negative use     OB/GYN negative ob/gyn ROS         Other                        Anesthesia Plan    ASA 3     general   (Severe ponv)  intravenous induction   Anesthetic plan, all risks, benefits, and alternatives have been provided, discussed and informed consent has been obtained with: patient.    Plan discussed with CRNA.

## 2018-10-19 NOTE — H&P
BRANDIN is a 53 year old female who is here for follow up of bilateral hip pain.  She has had an MRI evaluation and is here to discuss the results.  She has reached a point of his ability.  She has difficulty with activities of daily living.  She has difficulty getting in and out of the car complaints of night pain.  Complains of a limp.  She has difficulty ambulating distances.  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  The patient denies an injury.  The patient is on pain meds(CELECOXIB CAPSULE (CELECOXIB CAPS), TRAMADOL HCL TABLET (TRAMADOL HCL TABS)).  The patient states that she is having a dull pain which she rates at a 10 on a scale from 1-10.  The pain occurs intermittently.    The pain is greater on the right side Hip than the left.  The patient states that rest makes it better, while activity, standing and walking makes it worse.    She has a history of arthroscopic surgery in May 2017 for a meniscal tear, RIGHT knee.  She states that she did not have any significant relief from the surgery.  She has a history of LEFT total knee arthroplasty in 2012.  Denies any history of MRSA, DVT.  Review of Systems:     Allergies:  * penicillins (critical)  * cephro (critical)  Medications:  prednisolone sodium phosphate tablet disintegrating (prednisolone sodium phosphate tbdp)   biaxin tablet (clarithromycin tabs)   tramadol hcl tablet (tramadol hcl tabs)   sertraline hcl tablet (sertraline hcl tabs)   nabumetone tablet (nabumetone tabs)   diclofenac potassium tablet (diclofenac potassium tabs)   celecoxib capsule (celecoxib caps)   Patient History of:  OSTEOARTHRITIS  BLOOD CLOTS/EMBOLISM - NEGATIVE  MENOPAUSE  Surgical History:  LT TOTAL KNEE-   Gallbladder/Cholecystectomy-[[id number]]   Known Family History of:  heart disease-father  diabetes-father  Past medical, social, family histories and ROS reviewed today with the patient and changes documented in the chart (09/17/2018).     Physical Exam  Height:  67  in.    Weight:  237 lbs.     BMI:  37.25       Gait: antalgic               Ambulation: patient ambulates without assistive devices        Mental/HEENT/Cardio/Skin  The patient's general appearance was well nourished, well hydrated, no acute distress.  Orientation was alert and oritented x 3.  The patient's mood was normal.  A head exam revealed normocephalic/atraumatic.  An eye exam revealed pupils equal.  Pulmonary exam shows normal air exchange, no labored breathing, or shortness of breath.  A skin exam showed normal temperature and color in the area of examination.       Right Hip/Pelvis  Normal:   Neurovascular status is intact.  Sensation in hip is normal.  DP Pulse is 3+.  PT PULSE is 3+.  Capillary Refill is normal.  Reflexes are normal.    ROM  Internal Rotation: <30  External Rotation: <40  Abduction: <45  Adduction: <15  Flexion: <100  Extension: <5     Tenderness  Location: groin  Radiation of Pain: anterior thigh  Pain w/ Palpation: none  Lena Test: negative  Impingement: negative  Straight Leg Raise: negative     Strength  Quad: normal  Abduction: normal  Adduction: normal  Flexion: normal  Extension: normal  Positive Stinchfield sign  Positive Trendelenburg sign  Attempted movements of the RIGHT hip are painful and restricted.     Left Hip/Pelvis  Normal:   Neurovascular status is intact.  Sensation in hip is normal.  DP Pulse is 3+.  PT PULSE is 3+.  Capillary Refill is normal.  Reflexes are normal.    ROM  Internal Rotation: <30  External Rotation: <40  Abduction: <45  Adduction: <15  Flexion: <100  Extension: <5     Tenderness  Location: groin  Radiation of Pain: anterior thigh  Pain w/ Palpation: none  Lena Test: negative  Impingement: negative  Straight Leg Raise: negative     Strength  Quad: normal  Abduction: normal  Adduction: normal  Flexion: normal  Extension: normal           Imaging/Diagnostic Studies     X-rays of the right hip/pelvisX-rays show Acetabular femoral head osteophytes.   Osteoarthritis is severe.  Images show bone on bone arthrosis.      X-rays of the left hip/pelvisX-rays show Acetabular femoral head osteophytes.  Osteoarthritis is moderate.        Right Hip/Pelvis MRI: 09/12/2018    MRI shows Severe osteoarthritic changes on the RIGHT hip.  Moderate osteoarthritic changes on the LEFT hip.       Impression  Right hip primary osteoarthritis (FJU29-K09.11)  Left hip primary osteoarthritis (ROD06-S30.12)  Right knee primary osteoarthritis (SJD06-M32.11)  Aftercare following joint replacement surgery (PHT49-Y03.1)  Left artificial knee joint presence (RFW55-E95.652)     Plan  Options and alternatives were discussed in detail with the patient.   The patient has reached the point of disability and failed nonoperative management.   The patient is indicated for a total hip replacement .   The likely Risks and benefits of the procedure including but not limited to infection, DVT, pulmonary embolism, recurrent dislocation, Leg length discrepancy, periprosthetic fractures, possibility of injury to nerves or vessels, tendons, possibility of morbidity and mortality likely medical risks for stroke and heart attack, have been discussed in detail.   Despite the risks involved the patient would like to proceed. The patient is being scheduled for a RIGHT Total HIP arthroplasty by direct anterior approach at [facility] on October 19 of 2018.   I will request for Medical and cardiac clearance from Dr. Tiffanie Vicente MD.   Postoperative DVT prophylaxis - Patient has no high risk factors Plan for Aspirin.   Preoperative antibiotic prophylaxis - Plan for SCIP protocol with Cephazolin weight based. Will give Vancomycin in addition due to increased BMI.         We discussed the benefits of surgical intervention, as well as alternative treatments.  Potential surgical risks and complications include but are not limited to DVT, infection, neurovascular injury, fracture, implant wear, failure, possible  need for revision surgery, loss of motion, dislocation, limb length changes.  Sufficient opportunity was given to discuss the condition and treatment plan with the doctor, and all questions were answered for the patient.  Nonsurgical measures such as injections, medications, or physical therapy may not offer significant relief to this patient.  The discussion lasted 30 minutes.       Grace should follow up with MARY ABRAMS MD post op.

## 2018-10-19 NOTE — ANESTHESIA POSTPROCEDURE EVALUATION
"Patient: Grace Jacob    Procedure Summary     Date:  10/19/18 Room / Location:  [facility] OR [address] [place] [address] MAIN OR    Anesthesia Start:  0959 Anesthesia Stop:  1238    Procedure:  RIGHT TOTAL  ANTERIOR HIP ARTHROPLASTY (Right Hip) Diagnosis:       Primary osteoarthritis of right hip      (Primary osteoarthritis of right hip [M16.11])    Surgeon:  Mary Be MD Provider:  Victor Manuel Roberts MD    Anesthesia Type:  general ASA Status:  3          Anesthesia Type: general  Last vitals  BP   138/92 (10/19/18 1330)   Temp   36.6 °C (97.9 °F) (10/19/18 1233)   Pulse   60 (10/19/18 1330)   Resp   16 (10/19/18 1330)     SpO2   100 % (10/19/18 1330)     Post Anesthesia Care and Evaluation    Patient location during evaluation: bedside  Patient participation: complete - patient participated  Level of consciousness: awake and alert  Pain management: adequate  Airway patency: patent  Anesthetic complications: No anesthetic complications    Cardiovascular status: acceptable  Respiratory status: acceptable  Hydration status: acceptable    Comments: /92   Pulse 60   Temp 36.6 °C (97.9 °F) (Oral)   Resp 16   Ht 170.2 cm (67\")   Wt 110 kg (241 lb 13.5 oz)   SpO2 100%   BMI 37.88 kg/m²       "

## 2018-10-19 NOTE — THERAPY EVALUATION
Acute Care - Physical Therapy Initial Evaluation  [facility]     Patient Name: Grace Jacob  : 1965  MRN: [id number]  Today's Date: 10/19/2018   Onset of Illness/Injury or Date of Surgery: 10/19/18  Date of Referral to PT: 10/19/18  Referring Physician: [place]      Admit Date: 10/19/2018    Visit Dx:     ICD-10-CM ICD-9-CM   1. Difficulty walking R26.2 719.7   2. Primary osteoarthritis of right hip M16.11 715.15     Patient Active Problem List   Diagnosis   • Primary osteoarthritis of right hip     Past Medical History:   Diagnosis Date   • Anxiety    • Arthritis    • Chronic right hip pain    • PONV (postoperative nausea and vomiting)      Past Surgical History:   Procedure Laterality Date   • LAPAROSCOPIC CHOLECYSTECTOMY     • TOTAL KNEE ARTHROPLASTY Left         PT ASSESSMENT (last 12 hours)      Physical Therapy Evaluation     Row Name 10/19/18 1633          PT Evaluation Time/Intention    Subjective Information complains of;dizziness;nausea/vomiting  -PC     Document Type evaluation  -PC     Mode of Treatment physical therapy  -PC     Patient Effort good  -PC     Symptoms Noted During/After Treatment dizziness  -PC     Comment nause and vomiting with getting up  -PC     Row Name 10/19/18 1633          General Information    Patient Profile Reviewed? yes  -PC     Onset of Illness/Injury or Date of Surgery 10/19/18  -PC     Referring Physician [place]  -PC     Patient Observations alert;cooperative  -PC     Patient/Family Observations pt is in bed, feeling poorly due to nausea  -PC     Prior Level of Function independent:;all household mobility;community mobility  -PC     Pertinent History of Current Functional Problem R THR anterior  -PC     Existing Precautions/Restrictions hip, anterior  -PC     Equipment Ordered for Patient bedside commode;walker, front wheeled  -PC     Row Name 10/19/18 1631          Relationship/Environment    Primary Source of Support/Comfort spouse  -PC     Row Name 10/19/18  1635          Cognitive Assessment/Intervention- PT/OT    Orientation Status (Cognition) oriented x 4  -PC     Follows Commands (Cognition) WNL  -PC     Row Name 10/19/18 1635          Bed Mobility Assessment/Treatment    Bed Mobility Assessment/Treatment supine-sit;sit-supine  -PC     Supine-Sit [place] (Bed Mobility) contact guard  -PC     Row Name 10/19/18 1635          Transfer Assessment/Treatment    Transfer Assessment/Treatment sit-stand transfer;stand-sit transfer  -PC     Sit-Stand [place] (Transfers) minimum assist (75% patient effort)  -PC     Stand-Sit [place] (Transfers) minimum assist (75% patient effort)  -PC     Row Name 10/19/18 1635          Sit-Stand Transfer    Assistive Device (Sit-Stand Transfers) walker, front-wheeled  -PC     Row Name 10/19/18 1635          Stand-Sit Transfer    Assistive Device (Stand-Sit Transfers) walker, front-wheeled  -PC     Row Name 10/19/18 1635          Gait/Stairs Assessment/Training    [place] Level (Gait) minimum assist (75% patient effort)  -PC     Assistive Device (Gait) walker, front-wheeled  -PC     Distance in Feet (Gait) pt transferred to BSC due to need to use BR, then was able to take a few steps to chair, pt vomited while on BSC, cont to feel poorly with N/V, dizzy, so gait distance limited  -PC     Pattern (Gait) step-to  -PC     Deviations/Abnormal Patterns (Gait) antalgic  -PC     Row Name 10/19/18 1635          General ROM    GENERAL ROM COMMENTS WFL x R hip  -PC     Row Name 10/19/18 1635          MMT (Manual Muscle Testing)    General MMT Comments WFL x R hip  -PC     Row Name 10/19/18 1635          Motor Assessment/Intervention    Additional Documentation Therapeutic Exercise Interventions (Group)  -PC     Row Name 10/19/18 1635          Therapeutic Exercise    Comment (Therapeutic Exercise) pt perf THR ex, AP, QS, then had need to use BR  -PC     Row Name 10/19/18 1635          Pain Assessment    Additional Documentation Pain  Scale: Numbers Pre/Post-Treatment (Group);Pain Scale: Word Pre/Post-Treatment (Group)  -PC     Row Name 10/19/18 1635          Pain Scale: Numbers Pre/Post-Treatment    Pain Location - Side Right  -PC     Pain Location hip  -PC     Row Name 10/19/18 1635          Pain Scale: Word Pre/Post-Treatment    Pain: Word Scale, Pretreatment 2 - mild pain  -PC     Pain: Word Scale, Post-Treatment 2 - mild pain  -PC     Row Name             Wound 10/19/18 1056 Right hip incision    Wound - Properties Group Date first assessed: 10/19/18  -SW Time first assessed: 1056  -SW Side: Right  -SW Location: hip  -SW Type: incision  -SW    Row Name 10/19/18 1635          Plan of Care Review    Plan of Care Reviewed With patient  -PC     Row Name 10/19/18 1635          Physical Therapy Clinical Impression    Date of Referral to PT 10/19/18  -PC     Criteria for Skilled Interventions Met (PT Clinical Impression) yes;treatment indicated  -PC     Impairments Found (describe specific impairments) gait, locomotion, and balance;muscle performance;ROM  -PC     Rehab Potential (PT Clinical Summary) good, to achieve stated therapy goals  -PC     Row Name 10/19/18 1635          Physical Therapy Goals    Transfer Goal Selection (PT) transfer, PT goal 1  -PC     Gait Training Goal Selection (PT) gait training, PT goal 1  -PC     Stairs Goal Selection (PT) stairs, PT goal 1  -PC     Additional Documentation Stairs Goal Selection (PT) (Row);Precaution Goal Selection (PT) (Row)  -     Row Name 10/19/18 1635          Transfer Goal 1 (PT)    Activity/Assistive Device (Transfer Goal 1, PT) sit-to-stand/stand-to-sit  -     [place] Level/Cues Needed (Transfer Goal 1, PT) supervision required  -     Time Frame (Transfer Goal 1, PT) 2 - 3 days  -     Row Name 10/19/18 1635          Gait Training Goal 1 (PT)    Activity/Assistive Device (Gait Training Goal 1, PT) assistive device use;gait (walking locomotion);walker, rolling  -     [place]  Level (Gait Training Goal 1, PT) supervision required  -PC     Distance (Gait Goal 1, PT) 100 ft  -PC     Time Frame (Gait Training Goal 1, PT) 2 - 3 days  -PC     Row Name 10/19/18 1639          Stairs Goal 1 (PT)    Activity/Assistive Device (Stairs Goal 1, PT) ascending stairs;descending stairs;step-to-step  -PC     [place] Level/Cues Needed (Stairs Goal 1, PT) contact guard assist  -PC     Number of Stairs (Stairs Goal 1, PT) 4  -PC     Time Frame (Stairs Goal 1, PT) 3 days  -PC     Row Name 10/19/18 1636          Positioning and Restraints    Pre-Treatment Position in bed  -PC     Post Treatment Position chair  -PC     In Chair reclined;call light within reach;encouraged to call for assist;with family/caregiver  -PC       User Key  (r) = Recorded By, (t) = Taken By, (c) = Cosigned By    Initials Name Provider Type    PC Rhonda Arguello PT Physical Therapist    Virginia Sanchez RN Registered Nurse          Physical Therapy Education     Title: PT OT SLP Therapies (Active)     Topic: Physical Therapy (Active)     Point: Mobility training (Active)    Learning Progress Summary     Learner Status Readiness Method Response Comment Documented by    Patient Active Acceptance E,D NR  PC 10/19/18 1650          Point: Home exercise program (Active)    Learning Progress Summary     Learner Status Readiness Method Response Comment Documented by    Patient Active Acceptance E,D NR  PC 10/19/18 1650          Point: Body mechanics (Active)    Learning Progress Summary     Learner Status Readiness Method Response Comment Documented by    Patient Active Acceptance E,D NR  PC 10/19/18 1650          Point: Precautions (Active)    Learning Progress Summary     Learner Status Readiness Method Response Comment Documented by    Patient Active Acceptance E,D NR   10/19/18 1650                      User Key     Initials Effective Dates Name Provider Type [facility] 04/03/18 -  Rhonda Arguello PT Physical Therapist  PT                PT Recommendation and Plan  Anticipated Discharge Disposition (PT): home with assist, home with home health  Planned Therapy Interventions (PT Eval): bed mobility training, gait training, home exercise program, strengthening, transfer training  Therapy Frequency (PT Clinical Impression): 2 times/day  Outcome Summary/Treatment Plan (PT)  Anticipated Discharge Disposition (PT): home with assist, home with home health  Plan of Care Reviewed With: patient  Outcome Summary: pt presents s/p R THR with post op pain, weakness, and impaired functional mobility, she will benefit from PT to address. Pt plans home tomorrow with assist and home health, today pt was nauseous, had dizzy and vomited while getting up, so she was limited in ability to perform ex and walk, will progress as tolerated          Outcome Measures     Row Name 10/19/18 1600             How much help from another person do you currently need...    Turning from your back to your side while in flat bed without using bedrails? 3  -PC      Moving from lying on back to sitting on the side of a flat bed without bedrails? 3  -PC      Moving to and from a bed to a chair (including a wheelchair)? 3  -PC      Standing up from a chair using your arms (e.g., wheelchair, bedside chair)? 3  -PC      Climbing 3-5 steps with a railing? 2  -PC      To walk in hospital room? 3  -PC      AM-PAC 6 Clicks Score 17  -PC         Functional Assessment    Outcome Measure Options AM-PAC 6 Clicks Basic Mobility (PT)  -PC        User Key  (r) = Recorded By, (t) = Taken By, (c) = Cosigned By    Initials Name Provider Type    Rhonda Mcmahon, PT Physical Therapist           Time Calculation:         PT Charges     Row Name 10/19/18 1653             Time Calculation    Start Time 1559  -PC      Stop Time 1620  -PC      Time Calculation (min) 21 min  -PC      PT Received On 10/19/18  -PC      PT - Next Appointment 10/20/18  -PC      PT Goal Re-Cert Due Date 10/22/18   -PC        User Key  (r) = Recorded By, (t) = Taken By, (c) = Cosigned By    Initials Name Provider Type    PC Rhonda Arguello, PT Physical Therapist        Therapy Suggested Charges     Code   Minutes Charges    None           Therapy Charges for Today     Code Description Service Date Service Provider Modifiers Qty    84900315107 HC PT EVAL LOW COMPLEXITY 2 10/19/2018 Rhonda Arguello, PT GP 1    30194609053 HC PT THER PROC EA 15 MIN 10/19/2018 Rhonda Arguello, PT GP 1          PT G-Codes  Outcome Measure Options: AM-PAC 6 Clicks Basic Mobility (PT)  AM-PAC 6 Clicks Score: 17      hRonda Arguello PT  10/19/2018

## 2018-10-19 NOTE — ANESTHESIA PROCEDURE NOTES
Airway  Urgency: elective    Date/Time: 10/19/2018 10:06 AM  Airway not difficult    General Information and Staff    Patient location during procedure: OR  Anesthesiologist: MANUEL REDDY  CRNA: FATOU MARTINEZ    Indications and Patient Condition  Indications for airway management: airway protection    Preoxygenated: yes  Mask difficulty assessment: 1 - vent by mask    Final Airway Details  Final airway type: endotracheal airway      Successful airway: ETT  Cuffed: yes   Successful intubation technique: direct laryngoscopy  Endotracheal tube insertion site: oral  Blade: [place]  Blade size: 3  ETT size: 7.0 mm  Cormack-Lehane Classification: grade I - full view of glottis  Placement verified by: chest auscultation and capnometry   Cuff volume (mL): 6  Measured from: lips  ETT to lips (cm): 20  Number of attempts at approach: 1    Additional Comments  Smooth IV induction. Trachea intubated. Cuff up. Ett secured. BEBS. Dentition intact without injury.

## 2018-10-19 NOTE — PLAN OF CARE
Problem: Patient Care Overview  Goal: Plan of Care Review  Outcome: Ongoing (interventions implemented as appropriate)   10/19/18 1963   Coping/Psychosocial   Plan of Care Reviewed With patient   OTHER   Outcome Summary pt presents s/p R THR with post op pain, weakness, and impaired functional mobility, she will benefit from PT to address. Pt plans home tomorrow with assist and home health, today pt was nauseous, dizzy and vomited while getting up, so she was limited in ability to perform ex and walk, will progress as tolerated

## 2018-10-19 NOTE — OP NOTE
Operative  Note    Patient: Grace Jacob  YOB: 1965    Medical Record Number: [id number]    Attending Physician: Mary Be,*    Date of Service: 10/19/2018     Pre-op Diagnosis:   Primary osteoarthritis of right hip [M16.11]    Post-Op Diagnosis Codes:   Primary osteoarthritis of right hip [M16.11]    PROCEDURE PERFORMED: RIGHT TOTAL  ANTERIOR HIP ARTHROPLASTY by utilizing a Direct anterior approach with  Depuy   Mobile Gription Acetabular  Shell Sector with  holes size 50 mm outer diameter   Neutral ALTRX  Polyethylene acetabular  liner- 36 mm internal diameter,   [place]ail Cementless High offset Collarless femoral stem size # 9 with a   Delta ceramic femoral head- 36 mm outer diameter, + 5 neck length by utilizing a Blue Diamond table ([employer]).       Implant Name Type Inv. Item Serial No.  Lot No. LRB No. Used   SUT FW #2 W/TPR NDL 1/2 CIR 38IN 97CM 26.5MM JA - [id number] Implant SUT FW #2 W/TPR NDL 1/2 CIR 38IN 97CM 26.5MM JA  [id number] Right 1   SUT FW #2 W/TPR NDL 1/2 CIR 38IN 97CM 26.5MM JA - [id number] Implant SUT FW #2 W/TPR NDL 1/2 CIR 38IN 97CM 26.5MM JA  ARTHREX [id number] Right 1   SUT FW #2 W/TPR NDL 1/2 CIR 38IN 97CM 26.5MM JA - [id number] Implant SUT FW #2 W/TPR NDL 1/2 CIR 38IN 97CM 26.5MM JA  ARTHREX [id number] Right 1   LINER ACET PINN ALTRX NTRL [id number] - [id number] Implant LINER ACET PINN ALTRX NTRL [id number]  DEPUY [id number] Right 1   CUP ACET PINN SECTOR W GRIPTN 50MM - [id number] Implant CUP ACET PINN SECTOR W GRIPTN 50MM  DEPUY [id number] Right 1   STEM FEM CORAIL WO/COLLAR HIOFFST 9 - [id number] Implant STEM FEM CORAIL WO/COLLAR HIOFFST 9  DEPUY [id number] Right 1   HD FEM BIOLOXDELTA/ART CERAM 32MM PLS5 - [id number] Implant HD FEM BIOLOXDELTA/ART CERAM 32MM PLS5   DEPUY [id number] Right 1      debris was cleared.     SURGEON: Mary Be MD     ASSISTANT: Piero Jessica MD, Fellow    [facility]    The services of a first assist were necessary for  performing the procedure safely and expeditiously.  The first assist was present for the entire duration of the case and helped with positioning, retraction and closure of the incision.     ANESTHESIA:  General  Anesthesiologist: Victor Manuel Roberts MD  CRNA: Landy Medrano CRNA     Estimated Blood Loss: 300 mL    Specimens: * No orders in the log *    COMPLICATIONS: Nil.     DRAINS:      INDICATIONS: The patient is a 53 y.o. female who presented to  my office with complaints of progressively worsening pain in the hip.  The patient has reached a point of disability secondary to the severe pain and immobility. The patient had difficulty with activities of daily living.  The patient has failed nonoperative management.      The medical history was reviewed. The patient was indicated for a  total hip arthroplasty. Likely risks and benefits of the procedure including, but not limited to infection, DVT, pulmonary embolism, leg length discrepancy, recurrent dislocation, possibility of injury to nerves or vessels, and periprosthetic fractures, Possibility of medical complications including but not limited to stroke, heart attack have been discussed in detail. Despite the risks involved, the patient elected to proceed and informed consent was obtained. The patient was seen in the preoperative holding area, and the  operative site was marked.     DESCRIPTION OF PROCEDURE: The patient has been transferred to [facility] Operating Room.   Preoperative antibiotics in the form of Vancomycin and  Kefzol was given intravenously prior to the incision.   After achieving adequate general anesthesia, the patient was transferred onto the Huger table. All bony prominences were padded adequately.   The operative hip was prepped and draped in the usual sterile fashion.   Surgical timeout was done. Correct patient, surgical side and site were identified.  Tranexamic acid was given intravenously at the time of skin incision.    A  skin incision was made overlying the anterolateral aspect of the  hip for about 10 cm from just below and lateral to the anterior/superior iliac spine. Skin and subcutaneous tissue were incised and the deep fascia was incised in line with the skin incision overlying the tensor fascia chris muscle. The tensor muscle was retracted laterally and interval between the sartorius and the rectus femoris medially and the tensor fascia chris muscle was developed. The lateral circumflex femoral vessels were identified. They were clamped, cut, and ligated. Unnamed deep fascia was incised. Capsule of the hip joint was identified. Retractors were placed extracapsularly.     The capsule was incised in a L-shaped manner and the anterior capsule was tagged with FiberWire.  Followed by this, the retractors were placed intracapsularly. There was a large effusion and a thickened capsule.     Appropriate capsular releases were done along the inferior and  medial neck and along the saddle of the femoral neck.  The femoral neck was identified. The femoral neck osteotomy was done according to the preoperative templating , utilizing an oscillating saw. Femoral head was extracted using a corkscrew without any difficulty. The femoral head revealed presence of extensive loss of articular cartilage in the superior weight bearing portion along with femoral head osteophytes.      The acetabular cavity was inspected and exposed appropriately by placing retractors taking care to protect the anterior neurovascular structures. . The labrum was excised, pulvinar was excised from the cotyloid fossa. Acetabular cavity was progressively reamed, maintaining the correct inclination and version under image intensifier control. Adequate medialization was obtained. Adequate fit was found to be obtained at reamer size 49 .  The  [place] Gription  Acetabular Shell Sector with  holes  50 mm was seated into position. It was found to be seated satisfactorily with  adequate inclination and anteversion. There was good stability. Followed by this, a polyethylene liner was seated into position, checked for stability. This was a 36 mm internal diameter,  highly cross linked neutral 0° lip liner.     The periarticular tissue was infiltrated with local anaesthetic injection which consisted of Naropin, clonidine and ketorolac mixture.     Attention was then directed to the proximal femur. The proximal femur was delivered by utilizing a trochanteric hook placed just distal to the vastus tubercle and proximal to the gluteus bhavesh tendon. The leg was then externally rotated with the gross traction released and  hyperextension, adduction was done using the [place] table spar. The mechanical lift on the table was utilized to support the femur.  Appropriate capsular releases were made to expose the medial slope of the greater trochanter and the proximal femur was delivered. The femoral canal was entered by removing the lateral femoral neck with a box chisel followed by serial broaching. Adequate fit was found to be obtained with a size 9 broach. A trial reduction was performed. Leg lengths and offset were found to be satisfactory under image intensifier on comparison with the opposite hip under image intensifier. Reduction was found to be satisfactory and there was good stability with range of motion of the hip in internal and external rotation. Having been satisfied with the trials, the hip joint was dislocated.     The femoral  trial broach was removed and [place] cementless High offset Collarless femoral stem size # 9 was seated into position. This was found to be satisfactorily seated with good stability.      The delta ceramic femoral head 36 mm +5 mm neck length was seated onto the trunnion and impacted. Followed by this, the hip joint was reduced. Reduction was found to be satisfactory and image confirmed leg length, offset , implant position and stem fill of the femoral canal.  There  were no iatrogenic fractures. Hip joint was thoroughly irrigated with saline. Soft tissue hemostasis was secured. The sponge count and needle count was correct. The FiberWire sutures was tagged to the anterior capsular flaps and they were tied to each other. The incision was closed in layers with vicryl and monocryl sutures and the patient was transferred to the recovery room in a stable condition.     The patient tolerated the procedure well. There were no complications. The patient had adequate distal pulses and good capillary refill.     The patient will be mobilized with physical therapy.   Antibiotic prophylaxis  in the form of Kefzol postoperatively two more doses will be given in the first 24 hours.   The  patient will be started on DVT prophylaxis with  Aspirin 325 mg twice daily starting on postoperative day #1.    I discussed the satisfactory performance of the procedure with the patient's family and discussed with them the postoperative management.    Mary Be MD     Date: 10/19/2018  Time: 12:12 PM    cc: Chela Zambrano, TING; MD [place] Cohen Madhusudhan R,*

## 2018-10-19 NOTE — PROGRESS NOTES
Continued Stay Note  [facility]     Patient Name: Grace Jacob  MRN: [id number]  Today's Date: 10/19/2018    Admit Date: 10/19/2018          Discharge Plan     Row Name 10/19/18 1647       Plan    Plan Outpatient PT    Patient/Family in Agreement with Plan yes    Plan Comments See prescreen per IJEOMA Pedro, pt plans to d/c straight to outpatient PT. No other needs identified. Pt will d/c home with family support.              Discharge Codes    No documentation.           Mercy Wilson RN

## 2018-10-19 NOTE — CONSULTS
Patient Name:  Grace Jacob  YOB: 1965  MRN:  [id number]  Date of Admission:  10/19/2018  Date of Consult:  10/19/2018  Patient Care Team:  Chela Zambrano APRN as PCP - General (Family Medicine)    Inpatient Hospitalist Consult  Consult performed by: DARSHAN ELLIS  Consult ordered by: MARY ABRAMS  Reason for consult: Evaluate status and make recommendations regarding treatment of medical problems        Subjective   History of Present Illness  Ms. Jacob is a 53 y.o. female that has been admitted to [facility] following elective RT TOTAL HIP ARTHROPLASTY ANTERIOR WITH HANA TABLE. She has been admitted to an orthopedic floor following surgery and we were asked to see and assist with her medical problems. At the time of my exam she has just received Phenergan and is rather somnolent. She does open eyes with light stimulation but will not answer questions appropriately. Discussed with nursing staff and she's had nausea and mild vomiting. No abdominal complaints. No reported chest pain or problems breathing.      Past Medical History:   Diagnosis Date   • Anxiety    • Arthritis    • Chronic right hip pain    • PONV (postoperative nausea and vomiting)      Past Surgical History:   Procedure Laterality Date   • LAPAROSCOPIC CHOLECYSTECTOMY     • TOTAL KNEE ARTHROPLASTY Left      Family History   Problem Relation Age of Onset   • BRCA 1/2 Neg Hx    • Breast cancer Neg Hx    • Colon cancer Neg Hx    • Endometrial cancer Neg Hx    • Ovarian cancer Neg Hx    • Malig Hyperthermia Neg Hx      Social History   Substance Use Topics   • Smoking status: Never Smoker   • Smokeless tobacco: Never Used   • Alcohol use No     Prescriptions Prior to Admission   Medication Sig Dispense Refill Last Dose   • celecoxib (CELEBREX) 200 MG capsule Take 200 mg by mouth 2 (Two) Times a Day. TO HOLD 1 WEEK BEFORE SURGERY   10/18/2018 at 0800   • ibuprofen (ADVIL,MOTRIN) 200 MG tablet Take 200 mg by  mouth Every 6 (Six) Hours As Needed for Mild Pain . TO HOLD 1 WEEK BEFORE SURGERY   Past Week at Unknown time   • sertraline (ZOLOFT) 50 MG tablet Take 50 mg by mouth Every Night.   Past Week at Unknown time   • traMADol (ULTRAM) 50 MG tablet Take 50 mg by mouth Every Night.   10/16/2018     Allergies:  Ciprofloxacin and Penicillins    Review of Systems   Unable to perform ROS: Patient nonverbal       Objective      Vital Signs  Temp:  [97.2 °F (36.2 °C)-97.9 °F (36.6 °C)] 97.2 °F (36.2 °C)  Heart Rate:  [60-96] 66  Resp:  [12-20] 14  BP: (105-140)/(68-92) 112/68  Body mass index is 37.88 kg/m².    Physical Exam   Constitutional: She appears well-developed and well-nourished. She appears lethargic. No distress.   HENT:   Head: Normocephalic and atraumatic.   Eyes: Conjunctivae and EOM are normal. No scleral icterus.   Neck: Normal range of motion. Neck supple. No JVD present. No tracheal deviation present.   Cardiovascular: Normal rate and regular rhythm.    No murmur heard.  Pulmonary/Chest: Effort normal and breath sounds normal. She has no wheezes. She has no rales.   Abdominal: Soft. Bowel sounds are normal. She exhibits no distension and no mass. There is no tenderness.   Musculoskeletal: Normal range of motion. She exhibits no edema or deformity.   Neurological: She appears lethargic. No cranial nerve deficit.   Skin: Skin is warm and dry. No rash noted. No erythema.   Psychiatric: She has a normal mood and affect. Her behavior is normal.   Nursing note and vitals reviewed.      Results Review:   I reviewed the patient's new clinical results.  I reviewed the patient's new imaging results and agree with the interpretation.  I reviewed the patient's other test results and agree with the interpretation  I personally viewed and interpreted the patient's EKG/Telemetry data      Assessment/Plan     Active Hospital Problems    Diagnosis Date Noted   • **Primary osteoarthritis of right hip [M16.11] 09/17/2018   •  Obesity (BMI 30-39.9) [E66.9] 10/19/2018   • Anxiety [F41.9] 10/19/2018   • Arthritis [M19.90] 10/19/2018   • Postoperative nausea and vomiting [R11.2, Z98.890] 10/19/2018      Resolved Hospital Problems    Diagnosis Date Noted Date Resolved   No resolved problems to display.       Ms. Jacob is a 53 y.o. female who is POD#0 RT TOTAL HIP ARTHROPLASTY ANTERIOR WITH HANA TABLE.    · She seems to be doing okay thus far postoperatively.   · Vital signs and labs all stable. Brief exam unremarkable.  · Continue anti-emetics.  · Continue IVFs as ordered.    · Monitor renal function.  · SCDs have been ordered for DVT prophylaxis per surgery.  · She was encouraged to use incentive spirometer as instructed.      Thank you very much for asking [facility] to be involved in this patient's care. We will follow along with you.      Pancho Zapien MD  [facility]  10/19/18  5:49 PM

## 2018-10-20 VITALS
RESPIRATION RATE: 16 BRPM | HEART RATE: 72 BPM | WEIGHT: 241.84 LBS | DIASTOLIC BLOOD PRESSURE: 72 MMHG | HEIGHT: 67 IN | SYSTOLIC BLOOD PRESSURE: 112 MMHG | OXYGEN SATURATION: 96 % | BODY MASS INDEX: 37.96 KG/M2 | TEMPERATURE: 97.6 F

## 2018-10-20 PROBLEM — M16.11 PRIMARY OSTEOARTHRITIS OF RIGHT HIP: Status: RESOLVED | Noted: 2018-09-17 | Resolved: 2018-10-20

## 2018-10-20 PROBLEM — R11.2 POSTOPERATIVE NAUSEA AND VOMITING: Status: RESOLVED | Noted: 2018-10-19 | Resolved: 2018-10-20

## 2018-10-20 PROBLEM — Z98.890 POSTOPERATIVE NAUSEA AND VOMITING: Status: RESOLVED | Noted: 2018-10-19 | Resolved: 2018-10-20

## 2018-10-20 PROBLEM — M19.90 ARTHRITIS: Status: RESOLVED | Noted: 2018-10-19 | Resolved: 2018-10-20

## 2018-10-20 LAB
ANION GAP SERPL CALCULATED.3IONS-SCNC: 10.7 MMOL/L
BASOPHILS # BLD AUTO: 0.01 10*3/MM3 (ref 0–0.2)
BASOPHILS NFR BLD AUTO: 0 % (ref 0–1.5)
BUN BLD-MCNC: 13 MG/DL (ref 6–20)
BUN/CREAT SERPL: 16.3 (ref 7–25)
CALCIUM SPEC-SCNC: 8.9 MG/DL (ref 8.6–10.5)
CHLORIDE SERPL-SCNC: 104 MMOL/L (ref 98–107)
CO2 SERPL-SCNC: 24.3 MMOL/L (ref 22–29)
CREAT BLD-MCNC: 0.8 MG/DL (ref 0.57–1)
DEPRECATED RDW RBC AUTO: 46.4 FL (ref 37–54)
EOSINOPHIL # BLD AUTO: 0 10*3/MM3 (ref 0–0.7)
EOSINOPHIL NFR BLD AUTO: 0 % (ref 0.3–6.2)
ERYTHROCYTE [DISTWIDTH] IN BLOOD BY AUTOMATED COUNT: 13 % (ref 11.7–13)
GFR SERPL CREATININE-BSD FRML MDRD: 75 ML/MIN/1.73
GLUCOSE BLD-MCNC: 130 MG/DL (ref 65–99)
HCT VFR BLD AUTO: 35.8 % (ref 35.6–45.5)
HGB BLD-MCNC: 11.1 G/DL (ref 11.9–15.5)
IMM GRANULOCYTES # BLD: 0.03 10*3/MM3 (ref 0–0.03)
IMM GRANULOCYTES NFR BLD: 0.1 % (ref 0–0.5)
LYMPHOCYTES # BLD AUTO: 1.3 10*3/MM3 (ref 0.9–4.8)
LYMPHOCYTES NFR BLD AUTO: 6.4 % (ref 19.6–45.3)
MCH RBC QN AUTO: 30.2 PG (ref 26.9–32)
MCHC RBC AUTO-ENTMCNC: 31 G/DL (ref 32.4–36.3)
MCV RBC AUTO: 97.5 FL (ref 80.5–98.2)
MONOCYTES # BLD AUTO: 1.49 10*3/MM3 (ref 0.2–1.2)
MONOCYTES NFR BLD AUTO: 7.3 % (ref 5–12)
NEUTROPHILS # BLD AUTO: 17.48 10*3/MM3 (ref 1.9–8.1)
NEUTROPHILS NFR BLD AUTO: 86.2 % (ref 42.7–76)
PLATELET # BLD AUTO: 295 10*3/MM3 (ref 140–500)
PMV BLD AUTO: 9.8 FL (ref 6–12)
POTASSIUM BLD-SCNC: 4.3 MMOL/L (ref 3.5–5.2)
RBC # BLD AUTO: 3.67 10*6/MM3 (ref 3.9–5.2)
SODIUM BLD-SCNC: 139 MMOL/L (ref 136–145)
WBC NRBC COR # BLD: 20.31 10*3/MM3 (ref 4.5–10.7)

## 2018-10-20 PROCEDURE — 80048 BASIC METABOLIC PNL TOTAL CA: CPT | Performed by: ORTHOPAEDIC SURGERY

## 2018-10-20 PROCEDURE — 97110 THERAPEUTIC EXERCISES: CPT

## 2018-10-20 PROCEDURE — 85025 COMPLETE CBC W/AUTO DIFF WBC: CPT | Performed by: ORTHOPAEDIC SURGERY

## 2018-10-20 PROCEDURE — 97150 GROUP THERAPEUTIC PROCEDURES: CPT

## 2018-10-20 PROCEDURE — 25010000002 PROMETHAZINE PER 50 MG: Performed by: ORTHOPAEDIC SURGERY

## 2018-10-20 PROCEDURE — 25010000003 CEFAZOLIN IN DEXTROSE 2-4 GM/100ML-% SOLUTION: Performed by: ORTHOPAEDIC SURGERY

## 2018-10-20 RX ORDER — BISACODYL 5 MG/1
10 TABLET, DELAYED RELEASE ORAL DAILY PRN
Qty: 10 TABLET | Refills: 0 | Status: SHIPPED | OUTPATIENT
Start: 2018-10-20 | End: 2021-03-12

## 2018-10-20 RX ORDER — PSEUDOEPHEDRINE HCL 30 MG
100 TABLET ORAL 2 TIMES DAILY PRN
Qty: 30 CAPSULE | Refills: 0 | Status: SHIPPED | OUTPATIENT
Start: 2018-10-20 | End: 2018-11-03

## 2018-10-20 RX ORDER — HYDROCODONE BITARTRATE AND ACETAMINOPHEN 7.5; 325 MG/1; MG/1
1 TABLET ORAL EVERY 4 HOURS PRN
Qty: 60 TABLET | Refills: 0 | Status: SHIPPED | OUTPATIENT
Start: 2018-10-20 | End: 2018-10-29

## 2018-10-20 RX ORDER — ONDANSETRON 4 MG/1
4 TABLET, FILM COATED ORAL EVERY 6 HOURS PRN
Qty: 30 TABLET | Refills: 1 | Status: SHIPPED | OUTPATIENT
Start: 2018-10-20 | End: 2021-03-12

## 2018-10-20 RX ADMIN — HYDROCODONE BITARTRATE AND ACETAMINOPHEN 2 TABLET: 7.5; 325 TABLET ORAL at 03:09

## 2018-10-20 RX ADMIN — CEFAZOLIN SODIUM 2 G: 2 INJECTION, SOLUTION INTRAVENOUS at 02:11

## 2018-10-20 RX ADMIN — PROMETHAZINE HYDROCHLORIDE 25 MG: 25 INJECTION, SOLUTION INTRAMUSCULAR; INTRAVENOUS at 03:16

## 2018-10-20 RX ADMIN — CELECOXIB 200 MG: 200 CAPSULE ORAL at 07:52

## 2018-10-20 RX ADMIN — ASPIRIN 325 MG: 325 TABLET, DELAYED RELEASE ORAL at 07:52

## 2018-10-20 RX ADMIN — HYDROCODONE BITARTRATE AND ACETAMINOPHEN 2 TABLET: 7.5; 325 TABLET ORAL at 09:08

## 2018-10-20 RX ADMIN — HYDROCODONE BITARTRATE AND ACETAMINOPHEN 2 TABLET: 7.5; 325 TABLET ORAL at 13:07

## 2018-10-20 NOTE — PROGRESS NOTES
Name: Graec Jacob ADMIT: 10/19/2018   : 1965  PCP: Chela Zambrano APRN    MRN: [id number] LOS: 1 days   AGE/SEX: 53 y.o. female  ROOM: [facility]     Subjective   Much more alert today. Still feels slightly groggy. Does not remember me talking to her last evening.    Objective   Vital Signs  Temp:  [97.2 °F (36.2 °C)-99.3 °F (37.4 °C)] 99.3 °F (37.4 °C)  Heart Rate:  [60-96] 70  Resp:  [12-18] 16  BP: ()/(57-92) 98/57  SpO2:  [95 %-100 %] 95 %  on  Flow (L/min):  [2-4] 2;   Device (Oxygen Therapy): room air  Body mass index is 37.88 kg/m².    Physical Exam   Constitutional: She is oriented to person, place, and time. No distress.   Cardiovascular: Normal rate and regular rhythm.    No murmur heard.  Pulmonary/Chest: Effort normal and breath sounds normal.   Abdominal: Soft. Bowel sounds are normal. She exhibits no distension. There is no tenderness.   Musculoskeletal: Normal range of motion. She exhibits no edema.   Neurological: She is alert and oriented to person, place, and time.   Skin: Skin is warm and dry. She is not diaphoretic.       Results Review:       I reviewed the patient's new clinical results.    Results from last 7 days  Lab Units 10/20/18  0318   WBC 10*3/mm3 20.31*   HEMOGLOBIN g/dL 11.1*   PLATELETS 10*3/mm3 295     Results from last 7 days  Lab Units 10/20/18  0319   SODIUM mmol/L 139   POTASSIUM mmol/L 4.3   CHLORIDE mmol/L 104   CO2 mmol/L 24.3   BUN mg/dL 13   CREATININE mg/dL 0.80   GLUCOSE mg/dL 130*   Estimated Creatinine Clearance: 102.8 mL/min (by C-G formula based on SCr of 0.8 mg/dL).  Results from last 7 days  Lab Units 10/20/18  0319   CALCIUM mg/dL 8.9     Lab Results   Component Value Date    HGBA1C 5.20 10/04/2018   No results found for: POCGLU      aspirin 325 mg Oral Q12H   celecoxib 200 mg Oral BID   sertraline 50 mg Oral Nightly       sodium chloride 100 mL/hr Last Rate: 100 mL/hr (10/19/18 3796)         Assessment/Plan      Active Hospital Problems     Diagnosis Date Noted   • **Primary osteoarthritis of right hip [M16.11] 09/17/2018   • Obesity (BMI 30-39.9) [E66.9] 10/19/2018   • Anxiety [F41.9] 10/19/2018   • Arthritis [M19.90] 10/19/2018      Resolved Hospital Problems    Diagnosis Date Noted Date Resolved   • Postoperative nausea and vomiting [R11.2, Z98.890] 10/19/2018 10/20/2018       Ms. Jacob is a 53 y.o. female who is POD#1 RT TOTAL HIP ARTHROPLASTY ANTERIOR WITH HANA TABLE.    · She seems to be doing okay thus far postoperatively.   · Not sure the significance of the elevated WBC. No obvious infection.  · Vital signs stable. BP slightly low. Does not take blood pressure medication.  · SCDs have been ordered for DVT prophylaxis per surgery.  · She was encouraged to use incentive spirometer as instructed.      Pancho Zapien MD  [facility]  10/20/18  10:17 AM

## 2018-10-20 NOTE — PLAN OF CARE
Problem: Patient Care Overview  Goal: Plan of Care Review  Outcome: Ongoing (interventions implemented as appropriate)   10/20/18 0420 10/20/18 0752 10/20/18 8049   Coping/Psychosocial   Plan of Care Reviewed With --  patient --    OTHER   Outcome Summary --  --  Pt POD 1 RTH. VSS. dressing CDI. Pain is controlled with PO pain medication.l ambulates with therapy and walker, minimal assist. voiding per BRP without difficulty. educated on the importance of nausea meds as needed for HO PONV. verbalized understanding. D/C home with HH today. will cont to monitor.    Plan of Care Review   Progress improving --  --      Goal: Individualization and Mutuality  Outcome: Ongoing (interventions implemented as appropriate)    Goal: Discharge Needs Assessment  Outcome: Ongoing (interventions implemented as appropriate)    Goal: Interprofessional Rounds/Family Conf  Outcome: Ongoing (interventions implemented as appropriate)      Problem: Fall Risk (Adult)  Goal: Identify Related Risk Factors and Signs and Symptoms  Outcome: Ongoing (interventions implemented as appropriate)    Goal: Absence of Fall  Outcome: Ongoing (interventions implemented as appropriate)      Problem: Hip Arthroplasty (Total, Partial) (Adult)  Goal: Signs and Symptoms of Listed Potential Problems Will be Absent, Minimized or Managed (Hip Arthroplasty)  Outcome: Ongoing (interventions implemented as appropriate)    Goal: Anesthesia/Sedation Recovery  Outcome: Ongoing (interventions implemented as appropriate)

## 2018-10-20 NOTE — DISCHARGE SUMMARY
Orthopedic Discharge Summary      Patient: Grace Jacob    YOB: 1965    Medical Record Number: [id number]    Attending Physician: Mary Be,*  Consulting Physician(s):   Date of Admission: 10/19/2018  6:40 AM  Date of Discharge:     Admitting Diagnosis: Primary osteoarthritis of right hip [M16.11]  Primary osteoarthritis of right hip [M16.11]    Procedures Performed  Procedure(s):  RIGHT TOTAL  ANTERIOR HIP ARTHROPLASTY       Procedure(s):  RIGHT TOTAL  ANTERIOR HIP ARTHROPLASTY    Patient Active Problem List   Diagnosis   • Obesity (BMI 30-39.9)   • Anxiety          Allergies   Allergen Reactions   • Ciprofloxacin Rash   • Penicillins Rash          Discharge Medications      New Medications      Instructions Start Date   aspirin 325 MG EC tablet   325 mg, Oral, Every 12 Hours Scheduled      bisacodyl 5 MG EC tablet  Commonly known as:  DULCOLAX   10 mg, Oral, Daily PRN      docusate sodium 100 MG capsule   100 mg, Oral, 2 Times Daily PRN      HYDROcodone-acetaminophen 7.5-325 MG per tablet  Commonly known as:  NORCO   1 tablet, Oral, Every 4 Hours PRN      ondansetron 4 MG tablet  Commonly known as:  ZOFRAN   4 mg, Oral, Every 6 Hours PRN         Continue These Medications      Instructions Start Date   CELEBREX 200 MG capsule  Generic drug:  celecoxib   200 mg, Oral, 2 Times Daily, TO HOLD 1 WEEK BEFORE SURGERY       sertraline 50 MG tablet  Commonly known as:  ZOLOFT   50 mg, Oral, Nightly      traMADol 50 MG tablet  Commonly known as:  ULTRAM   50 mg, Oral, Nightly         Stop These Medications    ibuprofen 200 MG tablet  Commonly known as:  ADVIL,MOTRIN                 Past Medical History:   Diagnosis Date   • Anxiety    • Arthritis    • Chronic right hip pain    • PONV (postoperative nausea and vomiting)         Past Surgical History:   Procedure Laterality Date   • LAPAROSCOPIC CHOLECYSTECTOMY     • TOTAL KNEE ARTHROPLASTY Left         Social History     Occupational History   •  Not on file.     Social History Main Topics   • Smoking status: Never Smoker   • Smokeless tobacco: Never Used   • Alcohol use No   • Drug use: No   • Sexual activity: Defer      Social History     Social History Narrative   • No narrative on file        Family History   Problem Relation Age of Onset   • BRCA 1/2 Neg Hx    • Breast cancer Neg Hx    • Colon cancer Neg Hx    • Endometrial cancer Neg Hx    • Ovarian cancer Neg Hx    • Malig Hyperthermia Neg Hx        Physical Exam: 53 y.o. female  General Appearance:    Alert, cooperative, in no acute distress                      Vitals:    10/19/18 1913 10/19/18 2245 10/20/18 0258 10/20/18 0707   BP: 120/80 117/71 120/80 98/57   BP Location: Left arm Left arm Left arm Right arm   Patient Position: Lying Lying Lying Lying   Pulse: 62 79 73 70   Resp: 16 16 16 16   Temp: 97.6 °F (36.4 °C) 98.5 °F (36.9 °C) 97.9 °F (36.6 °C) 99.3 °F (37.4 °C)   TempSrc: Oral Oral Oral Oral   SpO2: 100% 96%  95%   Weight:       Height:            Hospital Course:  53 y.o. female admitted to [facility], [place] to services of Mary Be,Sophy with Primary osteoarthritis of right hip [M16.11]  Primary osteoarthritis of right hip [M16.11] on 10/19/2018 and underwent a RIGHT  total hip arthroplasty Per Mary Be MD. Antibiotic  Kefzol  every 8 hours and VTE prophylaxis  Aspirin 325 mg every 12 hours orally  were per SCIP protocols. Post-operatively the patient transferred to the post-operative floor where the patient underwent mobilization therapy that included active ROM exercises. Opioids were titrated to achieve appropriate pain management to allow for participation in mobilization exercises. Vital signs are now stable. The incision is intact without signs or symptoms of infection. Operative extremity neurovascular status remains intact.     Appropriate education re: incision care, activity levels, medications, hip dislocation precautions, and follow up visits was  completed and all questions were answered.     The patient is now deemed stable for discharge.    DISCHARGE DISPOSITION AND PLAN:  The  Patient is being discharged home with home health for PT /OT 2-3 X per week for 2-3 weeks and nursing care as needed.     DIAGNOSTIC TESTS:     Admission on 10/19/2018   Component Date Value Ref Range Status   • Glucose 10/20/2018 130* 65 - 99 mg/dL Final   • BUN 10/20/2018 13  6 - 20 mg/dL Final   • Creatinine 10/20/2018 0.80  0.57 - 1.00 mg/dL Final   • Sodium 10/20/2018 139  136 - 145 mmol/L Final   • Potassium 10/20/2018 4.3  3.5 - 5.2 mmol/L Final   • Chloride 10/20/2018 104  98 - 107 mmol/L Final   • CO2 10/20/2018 24.3  22.0 - 29.0 mmol/L Final   • Calcium 10/20/2018 8.9  8.6 - 10.5 mg/dL Final   • eGFR Non  Amer 10/20/2018 75  >60 mL/min/1.73 Final   • BUN/Creatinine Ratio 10/20/2018 16.3  7.0 - 25.0 Final   • Anion Gap 10/20/2018 10.7  mmol/L Final   • WBC 10/20/2018 20.31* 4.50 - 10.70 10*3/mm3 Final   • RBC 10/20/2018 3.67* 3.90 - 5.20 10*6/mm3 Final   • Hemoglobin 10/20/2018 11.1* 11.9 - 15.5 g/dL Final   • Hematocrit 10/20/2018 35.8  35.6 - 45.5 % Final   • MCV 10/20/2018 97.5  80.5 - 98.2 fL Final   • MCH 10/20/2018 30.2  26.9 - 32.0 pg Final   • MCHC 10/20/2018 31.0* 32.4 - 36.3 g/dL Final   • RDW 10/20/2018 13.0  11.7 - 13.0 % Final   • RDW-SD 10/20/2018 46.4  37.0 - 54.0 fl Final   • MPV 10/20/2018 9.8  6.0 - 12.0 fL Final   • Platelets 10/20/2018 295  140 - 500 10*3/mm3 Final   • Neutrophil % 10/20/2018 86.2* 42.7 - 76.0 % Final   • Lymphocyte % 10/20/2018 6.4* 19.6 - 45.3 % Final   • Monocyte % 10/20/2018 7.3  5.0 - 12.0 % Final   • Eosinophil % 10/20/2018 0.0* 0.3 - 6.2 % Final   • Basophil % 10/20/2018 0.0  0.0 - 1.5 % Final   • Immature Grans % 10/20/2018 0.1  0.0 - 0.5 % Final   • Neutrophils, Absolute 10/20/2018 17.48* 1.90 - 8.10 10*3/mm3 Final   • Lymphocytes, Absolute 10/20/2018 1.30  0.90 - 4.80 10*3/mm3 Final   • Monocytes, Absolute 10/20/2018  1.49* 0.20 - 1.20 10*3/mm3 Final   • Eosinophils, Absolute 10/20/2018 0.00  0.00 - 0.70 10*3/mm3 Final   • Basophils, Absolute 10/20/2018 0.01  0.00 - 0.20 10*3/mm3 Final   • Immature Grans, Absolute 10/20/2018 0.03  0.00 - 0.03 10*3/mm3 Final     No results found for: URICACID  No results found for: CRYSTAL  Microbiology Results (last 10 days)     ** No results found for the last 240 hours. **        Mammo Diagnostic Digital Tomosynthesis Right With Cad    Result Date: 10/16/2018  BI-RADS 4 SUSPICIOUS ABNORMALITY RIGHT BREAST  RECOMMENDATION: Surgical consult to evaluate right nipple lesion to exclude Paget's.  If Paget's is excluded and the patient continues to have bloody nipple discharge consideration for problem solving breast MRI should be made.  The standard false-negative rate of mammography is between 10% and 25%. Complex patterns or increased breast density will markedly elevate the false-negative rate of mammography.    A results letter, in lay terminology, will be given to the patient at the conclusion of the exam.  This report was finalized on 10/16/2018 3:52 PM by Dr. Mercy Arias MD.      Us Breast Right Limited    Result Date: 10/16/2018  BI-RADS 4 SUSPICIOUS ABNORMALITY RIGHT BREAST  RECOMMENDATION: Surgical consult to evaluate right nipple lesion to exclude Paget's.  If Paget's is excluded and the patient continues to have bloody nipple discharge consideration for problem solving breast MRI should be made.  The standard false-negative rate of mammography is between 10% and 25%. Complex patterns or increased breast density will markedly elevate the false-negative rate of mammography.    A results letter, in lay terminology, will be given to the patient at the conclusion of the exam.  This report was finalized on 10/16/2018 3:52 PM by Dr. Mercy Arias MD.      Xr Hip With Or Without Pelvis 1 View Right    Result Date: 10/19/2018  Right hip arthroplasty as expected..  This report was finalized  on 10/19/2018 3:38 PM by Dr. Brayan Torre M.D.      Follow-up Appointments  No future appointments.      Discharge and Follow up Instructions:     I. ACTIVITIES:  1. Exercises:  ? Complete exercise program as taught by the hospital physical therapist 2 times per day  ? Exercise program will be advanced by the physical therapist  ? During the day be up ambulating every 2 hours (while awake) for short distances  ? Complete the ankle pump exercises at least 10 times per hour (while awake)  ? Elevate legs when in bed and for at least 30 minutes during the day.Use cold packs 20-30 minutes approximately 5 times per day. This should be done before and after completing your exercises and at any time you are experiencing pain/ stiffness in your operative extremity.      2. Activities of Daily Living:  ? No tub baths, hot tubs, or swimming pools for 4 weeks  ? May shower and let water run over the incision on post-operative day #5 if no drainage. Do not scrub or rub the incision. Simply let the water run over the incision and pat dry.    II. Restrictions  ? Continue  Anterior hip precautions as taught at the hospital  ? Your surgeon will discuss with you when you will be able to drive again. Usual guidelines are you are to be off pain medications prior to driving.  ? Weight bearing is as tolerated  ? First week stay inside on even terrain. May go up and down stairs one stair at a time utilizing the hand rail once cleared by physical therapy to do so.  ? After one week, you may venture outside (if cleared to do so by physical therapist).    III. Precautions:  ? Everyone that comes near you should wash their hands  ? No elective dental, genital-urinary, or colon procedures or surgical procedures for 12 weeks after surgery unless absolutely necessary.  ?  If dental work or surgical procedure is deemed absolutely necessary, you will need to contact your surgeon as you will need to take antibiotics 1 hour prior to any dental  work (including teeth cleanings).  ? Please discuss with your surgeon prophylactic antibiotics as the length of time this intervention will be necessary for you varies with each patient’s health history and situation.  ? Avoid sick people. If you must be around someone who is ill, they should wear a mask.  ? Avoid visits to the Emergency Room or Urgent Care. If you feel you need to go to the Emergency Room, please notify your Surgeon's office.  ? Stockings are to be worn for one week after surgery and are to be placed on in the morning and removed at night. Observe your skin when stocking is removed for any problems. Monitor the stockings to ensure that any swelling is not causing the stockings to become too tight. In this case, remove stockings immediately.      IV. INCISION CARE:  ? Wash your hands prior to dressing changes  ? Change the dressing as needed to keep incision clean and dry. Utilize dry gauze and paper tape. Avoid touching the side of the gauze that goes against the incision with your hands.  ? No creams or ointments to the incision  ? May remove dressing once the incision is free of drainage  ? Do not touch or pick at the incision  ? Check incision every day and notify surgeon immediately if any of the following signs or symptoms are noted:  o Increase in redness  o Increase in swelling around the incision and of the entire extremity  o Increase in pain  o Drainage oozing from the incision  o Pulling apart of the edges of the incision  o Increase in overall body temperature (greater than 100.5 degrees)  ?  You have absorbable sutures with steristrips, please do not remove the  steristrips for 14 days, you can shower on them 6 days after surgery.      V. Medications:   1. Anticoagulants: You will be discharged on an anticoagulant. This is a prophylactic medication that helps prevent blood clots during your post-operative period.  You will be on  Aspirin 325 mg Enteric Coated every 12 hours  for  21  days.   ? While taking the anticoagulant, you should avoid taking any additional aspirin, ibuprofen (Advil or Motrin), Aleve (Naprosyn) or other non-steroidal anti-inflammatory medications.   ? Notify surgeon immediately if any jesus bleeding is noted in the urine, stool, emesis, or from the nose or the incision. Blood in the stool will often appear as black rather than red. Blood in urine may appear as pink. Blood in emesis may appear as brown/black like coffee grounds.  ? You will need to apply pressure for longer periods of time to any cuts or abrasions to stop bleeding  ? Avoid alcohol while taking anticoagulants    2. Stool Softeners: You will be at greater risk of constipation after surgery due to being less mobile and the pain medications.   ? Take stool softeners as instructed by your surgeon while on pain medications. Over the counter Colace 100 mg 1-2 capsules twice daily.   ? If stools become too loose or too frequent, please decreases the dosage or stop the stool softener.  ? If constipation occurs despite use of stool softeners, you are to continue the stool softeners and add a laxative (Milk of Magnesia 1 ounce daily as needed).  ? Dulcolax oral tabs or suppository, or a fleets enema can also be utilized for constipation and can be obtained over the counter.   ? If above interventions are unsuccessful in inducing bowel movements, please contact your surgeon's office / family physician's office.  ? Drink plenty of fluids, and eat fruits and vegetables during your recovery time    3. Pain Medications utilized after surgery are narcotics and the law requires that the following information be given to all patients that are prescribed narcotics:  ? CLASSIFICATION: Pain medications are called Opioids and are narcotics  ? LEGALITIES: It is illegal to share narcotics with others and to drive within 24 hours of taking narcotics  ? POTENTIAL SIDE EFFECTS: Potential side effects of opioids include: nausea,  vomiting, itching, dizziness, drowsiness, dry mouth, constipation, and difficulty urinating.  ? POTENTIAL ADVERSE EFFECTS:   o Opioid tolerance can develop with use of pain medications and this simply means that it requires more and more of the medication to control pain; however, this is seen more in patients that use opioids for longer periods of time.  o Opioid dependence can develop with use of Opioids and this simply means that to stop the medication can cause withdrawal symptoms; however, this is seen with patients that use Opioids for longer periods of time.  o Opioid addiction can develop with use of Opioids and the incidence of this is very unlikely in patients who take the medications as ordered and stop the medications as instructed.  o Opioid overdose can be dangerous, but is unlikely when the medication is taken as ordered and stopped when ordered. It is important not to mix opioids with alcohol or with and type of sedative such as Benadryl as this can lead to over sedation and respiratory difficulty.  ? DOSAGE:   o Pain medications will need to be taken consistently for the first week to decrease pain and promote adequate pain relief and participation in physical therapy.  o After the initial surgical pain begins to resolve, you may begin to decrease the pain medication. By the end of 6 weeks, you should be off of pain medications.  o Refills will not be given by the office during evening hours, on weekends, or after 6 weeks post-op.  o To seek refills on pain medications during the initial 6 week post-operative period, you must call the office 48 hours in advance to request the refill. The office will then notify you when to  the prescription. DO NOT wait until you are out of the medication to request a refill.    V. FOLLOW-UP VISITS:  ? You will need to follow up in the office with your surgeon in  2 weeks Nov 5, 2018. Please call this number [phone]  to schedule this appointment.  If you  have any concerns or suspected complications prior to your follow up visit, please call your surgeons office. Do not wait until your appointment time if you suspect complications. These will need to be addressed in the office promptly.    Date: 10/20/2018    Mary Be MD    CC: Chela Zambrano, APRN; MD Indiana Cohen, Mary GIRON,*

## 2018-10-20 NOTE — PROGRESS NOTES
Continued Stay Note  [facility]     Patient Name: Grace Jacob  MRN: [id number]  Today's Date: 10/20/2018    Admit Date: 10/19/2018          Discharge Plan     Row Name 10/20/18 0948       Plan    Plan Home with family and [facility]    Patient/Family in Agreement with Plan yes    Plan Comments Spoke with patient via phone and she states she plans to return home with family support and would like to use [facility](Notified Bill at ext. [phone]) and is agreeable to use [facility] [place] for DME needs.... CCP will follow and assist as needed.... Francia Stock RN,CCP               Discharge Codes    No documentation.           Francia Stock RN

## 2018-10-20 NOTE — PLAN OF CARE
Problem: Patient Care Overview  Goal: Plan of Care Review  Outcome: Ongoing (interventions implemented as appropriate)   10/20/18 0420   Coping/Psychosocial   Plan of Care Reviewed With patient   OTHER   Outcome Summary Pt has done well through the night. Minimal c/o of nausea, 1 dose of IV phenergan given. Pain well controlled with PO pain meds. Is able to ambulate with use of walker and 1 assist. Voiding adequately per [facility]. Plans to d/c home with HH when appropriate.    Plan of Care Review   Progress improving     Goal: Individualization and Mutuality  Outcome: Ongoing (interventions implemented as appropriate)    Goal: Discharge Needs Assessment  Outcome: Ongoing (interventions implemented as appropriate)   10/19/18 0712 10/19/18 1500 10/19/18 1647   Disability   Equipment Currently Used at Home none --  --    Discharge Needs Assessment   Concerns to be Addressed --  --  no discharge needs identified   Patient/Family Anticipates Transition to --  home with family --    Patient/Family Anticipated Services at Transition --  home health care --    Transportation Anticipated --  family or friend will provide --      Goal: Interprofessional Rounds/Family Conf  Outcome: Ongoing (interventions implemented as appropriate)   10/20/18 0420   Interdisciplinary Rounds/Family Conf   Participants nursing;patient       Problem: Fall Risk (Adult)  Goal: Absence of Fall  Outcome: Ongoing (interventions implemented as appropriate)   10/20/18 0420   Fall Risk (Adult)   Absence of Fall achieves outcome       Problem: Hip Arthroplasty (Total, Partial) (Adult)  Goal: Signs and Symptoms of Listed Potential Problems Will be Absent, Minimized or Managed (Hip Arthroplasty)  Outcome: Ongoing (interventions implemented as appropriate)   10/20/18 0420   Goal/Outcome Evaluation   Problems Assessed (Hip Arthroplasty) all   Problems Present (Hip Arthroplasty) pain;postoperative nausea and vomiting;situational response     Goal:  Anesthesia/Sedation Recovery  Outcome: Ongoing (interventions implemented as appropriate)   10/20/18 0767   Goal/Outcome Evaluation   Anesthesia/Sedation Recovery progressing toward baseline

## 2018-10-20 NOTE — PROGRESS NOTES
Patient: Grace Jacob  YOB: 1965     Date of Admission: 10/19/2018  6:40 AM Medical Record Number: [id number]     Attending Physician: Mary Be,*    Status Post:  RT TOTAL HIP ARTHROPLASTY ANTERIOR WITH HANA TABLE Post Operative Day Number: 1    Subjective : No new orthopaedic complaints     Pain Relief: some relief with present medication.     Systemic Complaints: No Complaints  Vitals:    10/19/18 1913 10/19/18 2245 10/20/18 0258 10/20/18 0707   BP: 120/80 117/71 120/80 98/57   BP Location: Left arm Left arm Left arm Right arm   Patient Position: Lying Lying Lying Lying   Pulse: 62 79 73 70   Resp: 16 16 16 16   Temp: 97.6 °F (36.4 °C) 98.5 °F (36.9 °C) 97.9 °F (36.6 °C) 99.3 °F (37.4 °C)   TempSrc: Oral Oral Oral Oral   SpO2: 100% 96%  95%   Weight:       Height:           Physical Exam: 53 y.o. female    General Appearance:       Alert, cooperative, in no acute distress                  Extremities:    Dressing Clean, Dry and Intact         Incision healthy without signs or symptoms of infections         No clinical sign of DVT        Able to do good movements of digits    Pulses:   Pulses palpable and equal bilaterally           Diagnostic Tests:       Results from last 7 days  Lab Units 10/20/18  0318   WBC 10*3/mm3 20.31*   HEMOGLOBIN g/dL 11.1*   HEMATOCRIT % 35.8   PLATELETS 10*3/mm3 295       Results from last 7 days  Lab Units 10/20/18  0319   SODIUM mmol/L 139   POTASSIUM mmol/L 4.3   CHLORIDE mmol/L 104   CO2 mmol/L 24.3   BUN mg/dL 13   CREATININE mg/dL 0.80   GLUCOSE mg/dL 130*   CALCIUM mg/dL 8.9         No results found for: CRP  No results found for: SEDRATE  No results found for: URICACID  No results found for: CRYSTAL  Microbiology Results (last 10 days)     ** No results found for the last 240 hours. **        Mammo Diagnostic Digital Tomosynthesis Right With Cad    Result Date: 10/16/2018  BI-RADS 4 SUSPICIOUS ABNORMALITY RIGHT BREAST  RECOMMENDATION:  Surgical consult to evaluate right nipple lesion to exclude Paget's.  If Paget's is excluded and the patient continues to have bloody nipple discharge consideration for problem solving breast MRI should be made.  The standard false-negative rate of mammography is between 10% and 25%. Complex patterns or increased breast density will markedly elevate the false-negative rate of mammography.    A results letter, in lay terminology, will be given to the patient at the conclusion of the exam.  This report was finalized on 10/16/2018 3:52 PM by Dr. Mercy Arias MD.      Us Breast Right Limited    Result Date: 10/16/2018  BI-RADS 4 SUSPICIOUS ABNORMALITY RIGHT BREAST  RECOMMENDATION: Surgical consult to evaluate right nipple lesion to exclude Paget's.  If Paget's is excluded and the patient continues to have bloody nipple discharge consideration for problem solving breast MRI should be made.  The standard false-negative rate of mammography is between 10% and 25%. Complex patterns or increased breast density will markedly elevate the false-negative rate of mammography.    A results letter, in lay terminology, will be given to the patient at the conclusion of the exam.  This report was finalized on 10/16/2018 3:52 PM by Dr. Mercy Arias MD.      Xr Hip With Or Without Pelvis 1 View Right    Result Date: 10/19/2018  Right hip arthroplasty as expected..  This report was finalized on 10/19/2018 3:38 PM by Dr. Brayan Torre M.D.              Current Medications:  Scheduled Meds:  aspirin 325 mg Oral Q12H   celecoxib 200 mg Oral BID   sertraline 50 mg Oral Nightly     Continuous Infusions:  sodium chloride 100 mL/hr Last Rate: 100 mL/hr (10/19/18 1521)     PRN Meds:.bisacodyl  •  bisacodyl  •  docusate sodium  •  HYDROcodone-acetaminophen  •  HYDROcodone-acetaminophen  •  HYDROmorphone **AND** naloxone  •  melatonin  •  ondansetron **OR** ondansetron ODT **OR** ondansetron  •  promethazine    Assessment: Status post  RT  TOTAL HIP ARTHROPLASTY ANTERIOR WITH HANA TABLE    Patient Active Problem List   Diagnosis   • Primary osteoarthritis of right hip   • Obesity (BMI 30-39.9)   • Anxiety   • Arthritis       PLAN:   Continues current post-op course  Anticoagulation: Aspirin started  Dressing Change prn  Mobilize with PT as tolerated per protocol    Weight Bearing: WBAT  Discharge Plan: OK to plan for discharge in  today to home and home health  from orthopadic perspective.    Mary Be MD    Date: 10/20/2018    Time: 11:36 AM

## 2018-10-20 NOTE — DISCHARGE PLACEMENT REQUEST
"Grace Leija (53 y.o. Female)     Date of Birth Social Security Number Address Home Phone MRN    1965  [address]  [place] [id number] [phone] [id number]    [employer] Marital Status          Religious        Admission Date Admission Type Admitting Provider Attending Provider Department, Room/Bed    10/19/18 Elective Mary Be MD Yakkanti, Madhusudhan R, MD [address], P876/1    Discharge Date Discharge Disposition Discharge Destination                       Attending Provider:  Mary Be MD    Allergies:  Ciprofloxacin, Penicillins    Isolation:  None   Infection:  None   Code Status:  CPR    Ht:  170.2 cm (67\")   Wt:  110 kg (241 lb 13.5 oz)    Admission Cmt:  None   Principal Problem:  Primary osteoarthritis of right hip [M16.11] More...                 Active Insurance as of 10/19/2018     Primary Coverage     Payor Plan Insurance Group Employer/Plan Group    [employer] EMPLOYEE [id number]     Payor Plan Address Payor Plan Phone Number Effective From Effective To    [address] [phone] 1/1/2015     [facility] [id number]       Subscriber Name Subscriber Birth Date Member ID       GRACE LEIJA 1965 [id number]                 Emergency Contacts      (Rel.) Home Phone Work Phone Mobile Phone    CecilAnt (Spouse) -- -- [phone]              "

## 2018-10-21 NOTE — THERAPY TREATMENT NOTE
Acute Care - Physical Therapy Treatment Note  [facility]     Patient Name: Grace Jacob  : 1965  MRN: [id number]  Today's Date: 10/21/2018  Onset of Illness/Injury or Date of Surgery: 10/19/18  Date of Referral to PT: 10/19/18  Referring Physician: [place]    Admit Date: 10/19/2018    Visit Dx:    ICD-10-CM ICD-9-CM   1. Difficulty walking R26.2 719.7   2. Primary osteoarthritis of right hip M16.11 715.15     Patient Active Problem List   Diagnosis   • Obesity (BMI 30-39.9)   • Anxiety       Therapy Treatment          Rehabilitation Treatment Summary     Row Name                Wound 10/19/18 1056 Right hip incision    Wound - Properties Group Date first assessed: 10/19/18 [SW] Time first assessed:  [] Side: Right [SW] Location: hip [SW] Type: incision [SW] Recorded by:  [GERALD] Virginia Manzo RN 10/19/18 105      User Key  (r) = Recorded By, (t) = Taken By, (c) = Cosigned By    Initials Name Effective Dates Discipline    SW Virginia Manzo RN 17 -  Nurse                     Physical Therapy Education     Title: PT OT SLP Therapies (Resolved)     Topic: Physical Therapy (Resolved)     Point: Mobility training (Resolved)    Learning Progress Summary     Learner Status Readiness Method Response Comment Documented by    Patient Active Acceptance E,D NR  PC 10/19/18 1650          Point: Home exercise program (Resolved)    Learning Progress Summary     Learner Status Readiness Method Response Comment Documented by    Patient Active Acceptance E,D NR  PC 10/19/18 1650          Point: Body mechanics (Resolved)    Learning Progress Summary     Learner Status Readiness Method Response Comment Documented by    Patient Active Acceptance E,D NR  PC 10/19/18 1650          Point: Precautions (Resolved)    Learning Progress Summary     Learner Status Readiness Method Response Comment Documented by    Patient Active Acceptance E,D NR  PC 10/19/18 1650                      User Key     Initials  Effective Dates Name Provider Type Discipline    PC 04/03/18 -  Rhonda Arguello, PT Physical Therapist PT                    PT Recommendation and Plan                Outcome Measures     Row Name 10/19/18 1600             How much help from another person do you currently need...    Turning from your back to your side while in flat bed without using bedrails? 3  -PC      Moving from lying on back to sitting on the side of a flat bed without bedrails? 3  -PC      Moving to and from a bed to a chair (including a wheelchair)? 3  -PC      Standing up from a chair using your arms (e.g., wheelchair, bedside chair)? 3  -PC      Climbing 3-5 steps with a railing? 2  -PC      To walk in hospital room? 3  -PC      AM-PAC 6 Clicks Score 17  -PC         Functional Assessment    Outcome Measure Options AM-PAC 6 Clicks Basic Mobility (PT)  -PC        User Key  (r) = Recorded By, (t) = Taken By, (c) = Cosigned By    Initials Name Provider Type    PC Rhonda Arguello, PT Physical Therapist           Time Calculation:     Therapy Suggested Charges     Code   Minutes Charges    None           Therapy Charges for Today     Code Description Service Date Service Provider Modifiers Qty    52639264633 HC PT THER PROC EA 15 MIN 10/20/2018 Brianda Escalante PTA GP 1    59338517284 HC PT THER PROC GROUP 10/20/2018 Brianda Escalante PTA GP 1          PT G-Codes  Outcome Measure Options: AM-PAC 6 Clicks Basic Mobility (PT)  AM-PAC 6 Clicks Score: 17    Brianda Escalante PTA  10/21/2018

## 2018-11-19 ENCOUNTER — TRANSCRIBE ORDERS (OUTPATIENT)
Dept: MAMMOGRAPHY | Facility: HOSPITAL | Age: 53
End: 2018-11-19

## 2018-11-19 DIAGNOSIS — N64.52 NIPPLE DISCHARGE: Primary | ICD-10-CM

## 2019-03-22 ENCOUNTER — HOSPITAL ENCOUNTER (OUTPATIENT)
Dept: MAMMOGRAPHY | Facility: HOSPITAL | Age: 54
Discharge: HOME | End: 2019-03-22
Attending: SURGERY | Admitting: SURGERY

## 2019-03-22 DIAGNOSIS — N64.52 NIPPLE DISCHARGE: ICD-10-CM

## 2019-03-22 PROCEDURE — 77066 DX MAMMO INCL CAD BI: CPT

## 2019-03-22 PROCEDURE — 77062 BREAST TOMOSYNTHESIS BI: CPT | Performed by: RADIOLOGY

## 2019-03-22 PROCEDURE — G0279 TOMOSYNTHESIS, MAMMO: HCPCS

## 2019-03-22 PROCEDURE — 77066 DX MAMMO INCL CAD BI: CPT | Performed by: RADIOLOGY

## 2020-02-17 ENCOUNTER — TRANSCRIBE ORDERS (OUTPATIENT)
Dept: ADMINISTRATIVE | Facility: HOSPITAL | Age: 55
End: 2020-02-17

## 2020-02-17 DIAGNOSIS — Z12.31 VISIT FOR SCREENING MAMMOGRAM: Primary | ICD-10-CM

## 2020-04-24 ENCOUNTER — APPOINTMENT (OUTPATIENT)
Dept: MAMMOGRAPHY | Facility: HOSPITAL | Age: 55
End: 2020-04-24

## 2020-06-09 ENCOUNTER — HOSPITAL ENCOUNTER (OUTPATIENT)
Dept: MAMMOGRAPHY | Facility: HOSPITAL | Age: 55
Discharge: HOME | End: 2020-06-09
Admitting: OBSTETRICS & GYNECOLOGY

## 2020-06-09 DIAGNOSIS — Z12.31 VISIT FOR SCREENING MAMMOGRAM: ICD-10-CM

## 2020-06-09 PROCEDURE — 77063 BREAST TOMOSYNTHESIS BI: CPT

## 2020-06-09 PROCEDURE — 77063 BREAST TOMOSYNTHESIS BI: CPT | Performed by: RADIOLOGY

## 2020-06-09 PROCEDURE — 77067 SCR MAMMO BI INCL CAD: CPT | Performed by: RADIOLOGY

## 2020-06-09 PROCEDURE — 77067 SCR MAMMO BI INCL CAD: CPT

## 2020-10-26 ENCOUNTER — HOSPITAL ENCOUNTER (OUTPATIENT)
Facility: HOSPITAL | Age: 55
Setting detail: SURGERY ADMIT
End: 2020-10-26
Attending: ORTHOPAEDIC SURGERY | Admitting: ORTHOPAEDIC SURGERY

## 2020-11-04 ENCOUNTER — TRANSCRIBE ORDERS (OUTPATIENT)
Dept: CARDIOLOGY | Facility: HOSPITAL | Age: 55
End: 2020-11-04

## 2020-11-04 ENCOUNTER — HOSPITAL ENCOUNTER (OUTPATIENT)
Dept: CARDIOLOGY | Facility: HOSPITAL | Age: 55
Discharge: HOME | End: 2020-11-04

## 2020-11-04 ENCOUNTER — HOSPITAL ENCOUNTER (OUTPATIENT)
Dept: GENERAL RADIOLOGY | Facility: HOSPITAL | Age: 55
Discharge: HOME | End: 2020-11-04

## 2020-11-04 ENCOUNTER — LAB (OUTPATIENT)
Dept: LAB | Facility: HOSPITAL | Age: 55
End: 2020-11-04

## 2020-11-04 DIAGNOSIS — M25.552 LEFT HIP PAIN: ICD-10-CM

## 2020-11-04 DIAGNOSIS — Z01.818 PRE-OP EXAM: ICD-10-CM

## 2020-11-04 DIAGNOSIS — M25.552 LEFT HIP PAIN: Primary | ICD-10-CM

## 2020-11-04 DIAGNOSIS — Z01.811 PRE-OP CHEST EXAM: ICD-10-CM

## 2020-11-04 LAB
ALBUMIN SERPL-MCNC: 4.4 G/DL (ref 3.5–5.2)
ALBUMIN/GLOB SERPL: 1.3 G/DL
ALP SERPL-CCNC: 137 U/L (ref 39–117)
ALT SERPL W P-5'-P-CCNC: 32 U/L (ref 1–33)
ANION GAP SERPL CALCULATED.3IONS-SCNC: 10 MMOL/L (ref 5–15)
APTT PPP: 30 SECONDS (ref 22.7–35.4)
AST SERPL-CCNC: 28 U/L (ref 1–32)
BACTERIA UR QL AUTO: ABNORMAL /HPF
BILIRUB SERPL-MCNC: 0.4 MG/DL (ref 0–1.2)
BILIRUB UR QL STRIP: NEGATIVE
BUN SERPL-MCNC: 10 MG/DL (ref 6–20)
BUN/CREAT SERPL: 13.9 (ref 7–25)
CALCIUM SPEC-SCNC: 9.2 MG/DL (ref 8.6–10.5)
CHLORIDE SERPL-SCNC: 103 MMOL/L (ref 98–107)
CLARITY UR: CLEAR
CO2 SERPL-SCNC: 25 MMOL/L (ref 22–29)
COLOR UR: YELLOW
CREAT SERPL-MCNC: 0.72 MG/DL (ref 0.57–1)
DEPRECATED RDW RBC AUTO: 45.7 FL (ref 37–54)
ERYTHROCYTE [DISTWIDTH] IN BLOOD BY AUTOMATED COUNT: 14 % (ref 12.3–15.4)
GFR SERPL CREATININE-BSD FRML MDRD: 84 ML/MIN/1.73
GLOBULIN UR ELPH-MCNC: 3.4 GM/DL
GLUCOSE SERPL-MCNC: 97 MG/DL (ref 65–99)
GLUCOSE UR STRIP-MCNC: NEGATIVE MG/DL
HCT VFR BLD AUTO: 40.5 % (ref 34–46.6)
HGB BLD-MCNC: 13.3 G/DL (ref 12–15.9)
HGB UR QL STRIP.AUTO: NEGATIVE
HYALINE CASTS UR QL AUTO: ABNORMAL /LPF
INR PPP: 0.95 (ref 0.9–1.1)
KETONES UR QL STRIP: NEGATIVE
LEUKOCYTE ESTERASE UR QL STRIP.AUTO: ABNORMAL
MCH RBC QN AUTO: 29.6 PG (ref 26.6–33)
MCHC RBC AUTO-ENTMCNC: 32.8 G/DL (ref 31.5–35.7)
MCV RBC AUTO: 90 FL (ref 79–97)
NITRITE UR QL STRIP: NEGATIVE
PH UR STRIP.AUTO: <=5 [PH] (ref 5–8)
PLATELET # BLD AUTO: 349 10*3/MM3 (ref 140–450)
PMV BLD AUTO: 9.2 FL (ref 6–12)
POTASSIUM SERPL-SCNC: 4.3 MMOL/L (ref 3.5–5.2)
PROT SERPL-MCNC: 7.8 G/DL (ref 6–8.5)
PROT UR QL STRIP: NEGATIVE
PROTHROMBIN TIME: 12.6 SECONDS (ref 11.7–14.2)
QT INTERVAL: 409 MS
RBC # BLD AUTO: 4.5 10*6/MM3 (ref 3.77–5.28)
RBC # UR: ABNORMAL /HPF
REF LAB TEST METHOD: ABNORMAL
SODIUM SERPL-SCNC: 138 MMOL/L (ref 136–145)
SP GR UR STRIP: 1.02 (ref 1–1.03)
SQUAMOUS #/AREA URNS HPF: ABNORMAL /HPF
UROBILINOGEN UR QL STRIP: ABNORMAL
WBC # BLD AUTO: 12.51 10*3/MM3 (ref 3.4–10.8)
WBC UR QL AUTO: ABNORMAL /HPF

## 2020-11-04 PROCEDURE — 93010 ELECTROCARDIOGRAM REPORT: CPT | Performed by: INTERNAL MEDICINE

## 2020-11-04 PROCEDURE — 80053 COMPREHEN METABOLIC PANEL: CPT

## 2020-11-04 PROCEDURE — 81001 URINALYSIS AUTO W/SCOPE: CPT

## 2020-11-04 PROCEDURE — 93005 ELECTROCARDIOGRAM TRACING: CPT | Performed by: ORTHOPAEDIC SURGERY

## 2020-11-04 PROCEDURE — 36415 COLL VENOUS BLD VENIPUNCTURE: CPT

## 2020-11-04 PROCEDURE — 71046 X-RAY EXAM CHEST 2 VIEWS: CPT

## 2020-11-04 PROCEDURE — 85610 PROTHROMBIN TIME: CPT

## 2020-11-04 PROCEDURE — 85027 COMPLETE CBC AUTOMATED: CPT

## 2020-11-04 PROCEDURE — 85730 THROMBOPLASTIN TIME PARTIAL: CPT

## 2020-11-06 ENCOUNTER — LAB (OUTPATIENT)
Dept: LAB | Facility: HOSPITAL | Age: 55
End: 2020-11-06

## 2020-11-06 DIAGNOSIS — M25.552 LEFT HIP PAIN: ICD-10-CM

## 2020-11-06 DIAGNOSIS — Z01.818 PRE-OP EXAM: ICD-10-CM

## 2020-11-06 PROCEDURE — C9803 HOPD COVID-19 SPEC COLLECT: HCPCS

## 2020-11-06 PROCEDURE — U0004 COV-19 TEST NON-CDC HGH THRU: HCPCS | Performed by: ORTHOPAEDIC SURGERY

## 2020-11-07 LAB — SARS-COV-2 RNA RESP QL NAA+PROBE: NOT DETECTED

## 2020-11-24 ENCOUNTER — TRANSCRIBE ORDERS (OUTPATIENT)
Dept: PREADMISSION TESTING | Facility: HOSPITAL | Age: 55
End: 2020-11-24

## 2020-11-24 DIAGNOSIS — Z01.818 OTHER SPECIFIED PRE-OPERATIVE EXAMINATION: Primary | ICD-10-CM

## 2020-11-27 ENCOUNTER — APPOINTMENT (OUTPATIENT)
Dept: PREADMISSION TESTING | Facility: HOSPITAL | Age: 55
End: 2020-11-27

## 2020-12-08 ENCOUNTER — APPOINTMENT (OUTPATIENT)
Dept: LAB | Facility: HOSPITAL | Age: 55
End: 2020-12-08

## 2021-02-23 ENCOUNTER — PREP FOR SURGERY (OUTPATIENT)
Dept: OTHER | Facility: HOSPITAL | Age: 56
End: 2021-02-23

## 2021-02-23 DIAGNOSIS — T84.023A INSTABILITY OF INTERNAL LEFT KNEE PROSTHESIS, INITIAL ENCOUNTER (HCC): Primary | ICD-10-CM

## 2021-02-23 RX ORDER — VANCOMYCIN HYDROCHLORIDE 1 G/200ML
1000 INJECTION, SOLUTION INTRAVENOUS ONCE
Status: CANCELLED | OUTPATIENT
Start: 2021-03-16 | End: 2021-02-23

## 2021-02-23 RX ORDER — ACETAMINOPHEN 500 MG
1000 TABLET ORAL ONCE
Status: CANCELLED | OUTPATIENT
Start: 2021-03-16 | End: 2021-02-23

## 2021-02-23 RX ORDER — OXYCODONE HCL 10 MG/1
20 TABLET, FILM COATED, EXTENDED RELEASE ORAL ONCE
Status: CANCELLED | OUTPATIENT
Start: 2021-03-16 | End: 2021-02-23

## 2021-02-23 RX ORDER — CEFAZOLIN SODIUM 2 G/100ML
2 INJECTION, SOLUTION INTRAVENOUS ONCE
Status: CANCELLED | OUTPATIENT
Start: 2021-03-16 | End: 2021-02-23

## 2021-02-24 ENCOUNTER — TRANSCRIBE ORDERS (OUTPATIENT)
Dept: PREADMISSION TESTING | Facility: HOSPITAL | Age: 56
End: 2021-02-24

## 2021-02-24 DIAGNOSIS — Z01.818 OTHER SPECIFIED PRE-OPERATIVE EXAMINATION: Primary | ICD-10-CM

## 2021-03-01 ENCOUNTER — APPOINTMENT (OUTPATIENT)
Dept: PREADMISSION TESTING | Facility: HOSPITAL | Age: 56
End: 2021-03-01

## 2021-03-08 ENCOUNTER — HOSPITAL ENCOUNTER (OUTPATIENT)
Facility: HOSPITAL | Age: 56
Setting detail: SURGERY ADMIT
End: 2021-03-08
Attending: ORTHOPAEDIC SURGERY | Admitting: ORTHOPAEDIC SURGERY

## 2021-03-09 ENCOUNTER — TRANSCRIBE ORDERS (OUTPATIENT)
Dept: PREADMISSION TESTING | Facility: HOSPITAL | Age: 56
End: 2021-03-09

## 2021-03-12 ENCOUNTER — OFFICE VISIT (OUTPATIENT)
Dept: OBSTETRICS AND GYNECOLOGY | Facility: CLINIC | Age: 56
End: 2021-03-12

## 2021-03-12 ENCOUNTER — APPOINTMENT (OUTPATIENT)
Dept: PREADMISSION TESTING | Facility: HOSPITAL | Age: 56
End: 2021-03-12

## 2021-03-12 ENCOUNTER — APPOINTMENT (OUTPATIENT)
Dept: LAB | Facility: HOSPITAL | Age: 56
End: 2021-03-12

## 2021-03-12 VITALS
RESPIRATION RATE: 18 BRPM | BODY MASS INDEX: 38.04 KG/M2 | SYSTOLIC BLOOD PRESSURE: 139 MMHG | OXYGEN SATURATION: 97 % | HEART RATE: 77 BPM | HEIGHT: 68 IN | TEMPERATURE: 98.5 F | WEIGHT: 251 LBS | DIASTOLIC BLOOD PRESSURE: 95 MMHG

## 2021-03-12 VITALS
DIASTOLIC BLOOD PRESSURE: 82 MMHG | BODY MASS INDEX: 37.74 KG/M2 | SYSTOLIC BLOOD PRESSURE: 128 MMHG | HEIGHT: 68 IN | WEIGHT: 249 LBS

## 2021-03-12 DIAGNOSIS — Z01.419 PAP TEST, AS PART OF ROUTINE GYNECOLOGICAL EXAMINATION: Primary | ICD-10-CM

## 2021-03-12 DIAGNOSIS — T84.023A INSTABILITY OF INTERNAL LEFT KNEE PROSTHESIS, INITIAL ENCOUNTER (HCC): ICD-10-CM

## 2021-03-12 LAB
ANION GAP SERPL CALCULATED.3IONS-SCNC: 7.2 MMOL/L (ref 5–15)
BUN SERPL-MCNC: 17 MG/DL (ref 6–20)
BUN/CREAT SERPL: 24.3 (ref 7–25)
CALCIUM SPEC-SCNC: 9 MG/DL (ref 8.6–10.5)
CHLORIDE SERPL-SCNC: 102 MMOL/L (ref 98–107)
CO2 SERPL-SCNC: 28.8 MMOL/L (ref 22–29)
CREAT SERPL-MCNC: 0.7 MG/DL (ref 0.57–1)
DEPRECATED RDW RBC AUTO: 48.4 FL (ref 37–54)
ERYTHROCYTE [DISTWIDTH] IN BLOOD BY AUTOMATED COUNT: 14.9 % (ref 12.3–15.4)
GFR SERPL CREATININE-BSD FRML MDRD: 87 ML/MIN/1.73
GLUCOSE SERPL-MCNC: 79 MG/DL (ref 65–99)
HCT VFR BLD AUTO: 39.5 % (ref 34–46.6)
HGB BLD-MCNC: 12.8 G/DL (ref 12–15.9)
MCH RBC QN AUTO: 29.1 PG (ref 26.6–33)
MCHC RBC AUTO-ENTMCNC: 32.4 G/DL (ref 31.5–35.7)
MCV RBC AUTO: 89.8 FL (ref 79–97)
PLATELET # BLD AUTO: 289 10*3/MM3 (ref 140–450)
PMV BLD AUTO: 9.5 FL (ref 6–12)
POTASSIUM SERPL-SCNC: 4 MMOL/L (ref 3.5–5.2)
RBC # BLD AUTO: 4.4 10*6/MM3 (ref 3.77–5.28)
SODIUM SERPL-SCNC: 138 MMOL/L (ref 136–145)
WBC # BLD AUTO: 8.47 10*3/MM3 (ref 3.4–10.8)

## 2021-03-12 PROCEDURE — 36415 COLL VENOUS BLD VENIPUNCTURE: CPT

## 2021-03-12 PROCEDURE — 99396 PREV VISIT EST AGE 40-64: CPT | Performed by: OBSTETRICS & GYNECOLOGY

## 2021-03-12 PROCEDURE — 80048 BASIC METABOLIC PNL TOTAL CA: CPT

## 2021-03-12 PROCEDURE — 85027 COMPLETE CBC AUTOMATED: CPT

## 2021-03-12 RX ORDER — VALACYCLOVIR HYDROCHLORIDE 500 MG/1
TABLET, FILM COATED ORAL DAILY PRN
COMMUNITY
Start: 2021-03-07 | End: 2021-07-19

## 2021-03-12 RX ORDER — DULOXETIN HYDROCHLORIDE 60 MG/1
60 CAPSULE, DELAYED RELEASE ORAL NIGHTLY
COMMUNITY
Start: 2020-12-29 | End: 2022-07-07 | Stop reason: SDUPTHER

## 2021-03-12 RX ORDER — TRIAMCINOLONE ACETONIDE 1 MG/G
CREAM TOPICAL
COMMUNITY
Start: 2020-12-04 | End: 2021-03-12

## 2021-03-12 ASSESSMENT — KOOS JR
KOOS JR SCORE: 16
KOOS JR SCORE: 47.487

## 2021-03-12 NOTE — DISCHARGE INSTRUCTIONS
Take the following medications the morning of surgery:  NONE    If you are on prescription narcotic pain medication to control your pain you may also take that medication the morning of surgery.    General Instructions: CLEAR LIQUIDS UNTIL 5:30 AM MORNING OF SURGERY  • Do not eat solid food after midnight the night before surgery.  • You may drink clear liquids day of surgery but must stop at least one hour before your hospital arrival time.  • It is beneficial for you to have a clear drink that contains carbohydrates the day of surgery.  We suggest a 12 to 20 ounce bottle of Gatorade or Powerade for non-diabetic patients or a 12 to 20 ounce bottle of G2 or Powerade Zero for diabetic patients. (Pediatric patients, are not advised to drink a 12 to 20 ounce carbohydrate drink)    Clear liquids are liquids you can see through.  Nothing red in color.     Plain water                               Sports drinks  Sodas                                   Gelatin (Jell-O)  Fruit juices without pulp such as white grape juice and apple juice  Popsicles that contain no fruit or yogurt  Tea or coffee (no cream or milk added)  Gatorade / Powerade  G2 / Powerade Zero    • Infants may have breast milk up to four hours before surgery.  • Infants drinking formula may drink formula up to six hours before surgery.   • Patients who avoid smoking, chewing tobacco and alcohol for 4 weeks prior to surgery have a reduced risk of post-operative complications.  Quit smoking as many days before surgery as you can.  • Do not smoke, use chewing tobacco or drink alcohol the day of surgery.   • If applicable bring your C-PAP/ BI-PAP machine.  • Bring any papers given to you in the doctor’s office.  • Wear clean comfortable clothes.  • Do not wear contact lenses, false eyelashes or make-up.  Bring a case for your glasses.   • Bring crutches or walker if applicable.  • Remove all piercings.  Leave jewelry and any other valuables at home.  • Hair  extensions with metal clips must be removed prior to surgery.  • The Pre-Admission Testing nurse will instruct you to bring medications if unable to obtain an accurate list in Pre-Admission Testing.        If you were given a blood bank ID arm band remember to bring it with you the day of surgery.    Preventing a Surgical Site Infection:  • For 2 to 3 days before surgery, avoid shaving with a razor because the razor can irritate skin and make it easier to develop an infection.    • Any areas of open skin can increase the risk of a post-operative wound infection by allowing bacteria to enter and travel throughout the body.  Notify your surgeon if you have any skin wounds / rashes even if it is not near the expected surgical site.  The area will need assessed to determine if surgery should be delayed until it is healed.  • The night prior to surgery shower using a fresh bar of anti-bacterial soap (such as Dial) and clean washcloth.  Sleep in a clean bed with clean clothing.  Do not allow pets to sleep with you.  • Shower on the morning of surgery using a fresh bar of anti-bacterial soap (such as Dial) and clean washcloth.  Dry with a clean towel and dress in clean clothing.  • Ask your surgeon if you will be receiving antibiotics prior to surgery.  • Make sure you, your family, and all healthcare providers clean their hands with soap and water or an alcohol based hand  before caring for you or your wound.    Day of surgery: 3/16/2021 ARRIVALTIME 6:30 AM  Your arrival time is approximately two hours before your scheduled surgery time.  Upon arrival, a Pre-op nurse and Anesthesiologist will review your health history, obtain vital signs, and answer questions you may have.  The only belongings needed at this time will be a list of your home medications and if applicable your C-PAP/BI-PAP machine.  A Pre-op nurse will start an IV and you may receive medication in preparation for surgery, including something to  help you relax.     Please be aware that surgery does come with discomfort.  We want to make every effort to control your discomfort so please discuss any uncontrolled symptoms with your nurse.   Your doctor will most likely have prescribed pain medications.      If you are going home after surgery you will receive individualized written care instructions before being discharged.  A responsible adult must drive you to and from the hospital on the day of your surgery and stay with you for 24 hours.  Discharge prescriptions can be filled by the hospital pharmacy during regular pharmacy hours.  If you are having surgery late in the day/evening your prescription may be e-prescribed to your pharmacy.  Please verify your pharmacy hours or chose a 24 hour pharmacy to avoid not having access to your prescription because your pharmacy has closed for the day.    If you are staying overnight following surgery, you will be transported to your hospital room following the recovery period.  [facility] has all private rooms.    If you have any questions please call Pre-Admission Testing at [phone].  Deductibles and co-payments are collected on the day of service. Please be prepared to pay the required co-pay, deductible or deposit on the day of service as defined by your plan.    Patient Education for Self-Quarantine Process    Following your COVID testing, we strongly recommend that you do not leave your home after you have been tested for COVID except to get medical care. This includes not going to work, school or to public areas.  If this is not possible for you to do please limit your activities to only required outings.  Be sure to wear a mask when you are with other people, practice social distancing and wash your hands frequently.      The following items provide additional details to keep you safe.  • Wash your hands with soap and water frequently for at least 20 seconds.   • Avoid touching your eyes,  nose and mouth with unwashed hands.  • Do not share anything - utensils, towels, food from the same bowl.   • Have your own utensils, drinking glass, dishes, towels and bedding.   • Do not have visitors.   • Do use FaceTime to stay in touch with family and friends.  • You should stay in a specific room away from others if possible.   • Stay at least 6 feet away from others in the home if you cannot have a dedicated room to yourself.   • Do not snuggle with your pet. While the CDC says there is no evidence that pets can spread COVID-19 or be infected from humans, it is probably best to avoid “petting, snuggling, being kissed or licked and sharing food (during self-quarantine)”, according to the CDC.   • Sanitize household surfaces daily. Include all high touch areas (door handles, light switches, phones, countertops, etc.)  • Do not share a bathroom with others, if possible.   • Wear a mask around others in your home if you are unable to stay in a separate room or 6 feet apart. If  you are unable to wear a mask, have your family member wear a mask if they must be within 6 feet of you.   Call your surgeon immediately if you experience any of the following symptoms:  • Sore Throat  • Shortness of Breath or difficulty breathing  • Cough  • Chills  • Body soreness or muscle pain  • Headache  • Fever  • New loss of taste or smell  • Do not arrive for your surgery ill.  Your procedure will need to be rescheduled to another time.  You will need to call your physician before the day of surgery to avoid any unnecessary exposure to hospital staff as well as other patients.    CHLORHEXIDINE CLOTH INSTRUCTIONS  The morning of surgery follow these instructions using the Chlorhexidine cloths you've been given.  These steps reduce bacteria on the body.  Do not use the cloths near your eyes, ears mouth, genitalia or on open wounds.  Throw the cloths away after use but do not try to flush them down a toilet.      • Open and remove  one cloth at a time from the package.    • Leave the cloth unfolded and begin the bathing.  • Massage the skin with the cloths using gentle pressure to remove bacteria.  Do not scrub harshly.   • Follow the steps below with one 2% CHG cloth per area (6 total cloths).  • One cloth for neck, shoulders and chest.  • One cloth for both arms, hands, fingers and underarms (do underarms last).  • One cloth for the abdomen followed by groin.  • One cloth for right leg and foot including between the toes.  • One cloth for left leg and foot including between the toes.  • The last cloth is to be used for the back of the neck, back and buttocks.    Allow the CHG to air dry 3 minutes on the skin which will give it time to work and decrease the chance of irritation.  The skin may feel sticky until it is dry.  Do not rinse with water or any other liquid or you will lose the beneficial effects of the CHG.  If mild skin irritation occurs, do rinse the skin to remove the CHG.  Report this to the nurse at time of admission.  Do not apply lotions, creams, ointments, deodorants or perfumes after using the clothes. Dress in clean clothes before coming to the hospital.    BACTROBAN NASAL OINTMENT  There are many germs normally in your nose. Bactroban is an ointment that will help reduce these germs. Please follow these instructions for Bactroban use:      ____The day before surgery in the morning  Date________    ____The day before surgery in the evening              Date________    ____The day of surgery in the morning    Date________    **Squirt ½ package of Bactroban Ointment onto a cotton applicator and apply to inside of 1st nostril.  Squirt the remaining Bactroban and apply to the inside of the other nostril.

## 2021-03-12 NOTE — PROGRESS NOTES
GYN Annual Exam     CC - Here for annual exam.        HPI  Grace Jacob is a 55 y.o. female, , who presents for annual well woman exam.  She is postmenopausal.  Patient denies vaginal bleeding. ..  Patient reports problems with: none. There were no changes to her medical or surgical history since her last visit.. Partner Status: Marital Status: .  New Partners since last visit: no.    patient having L knee replacement fixed on Tuesday.           Additional OB/GYN History   Current contraception: contraceptive methods: Post menopausal status  Desires to: do not start contraception  On HRT? No  Last Pap :   Last Completed Pap Smear       Status Date      PAP SMEAR Done 2020 negative        History of abnormal Pap smear: no  Family history of uterine, colon, breast, or ovarian cancer: no  Performs monthly Self-Breast Exam: yes  Last mammogram:   Last Completed Mammogram       Status Date      MAMMOGRAM Done 2020 MAMMO SCREENING DIGITAL TOMOSYNTHESIS BILATERAL W CAD     Patient has more history with this topic...        Last colonoscopy:   Last Completed Colonoscopy       Status Date      COLONOSCOPY Done 2014 negative        Last DEXA: On  and results were Normal  Exercises Regularly: no  Feelings of Anxiety or Depression: no      Tobacco Usage?: No   OB History        2    Para   2    Term   2            AB        Living           SAB        TAB        Ectopic        Molar        Multiple        Live Births                    Health Maintenance   Topic Date Due   • Annual Gynecologic Pelvic and Breast Exam  Never done   • ANNUAL PHYSICAL  Never done   • TDAP/TD VACCINES (1 - Tdap) Never done   • ZOSTER VACCINE (1 of 2) Never done   • HEPATITIS C SCREENING  Never done   • INFLUENZA VACCINE  2020   • MAMMOGRAM  2022   • PAP SMEAR  2023   • COLONOSCOPY  2024   • Pneumococcal Vaccine 0-64  Aged Out   • MENINGOCOCCAL VACCINE  Aged Out       The  "additional following portions of the patient's history were reviewed and updated as appropriate: allergies, current medications, past family history, past medical history, past social history and past surgical history.    Review of Systems   Constitutional: Negative.    HENT: Negative.    Respiratory: Negative.    Cardiovascular: Negative.    Gastrointestinal: Negative.    Genitourinary: Positive for urinary incontinence.   Musculoskeletal: Negative.    Skin: Negative.    Allergic/Immunologic: Negative.    Neurological: Negative.    Hematological: Negative.    Psychiatric/Behavioral: Negative.      All other systems reviewed and are negative.     I have reviewed and agree with the HPI, ROS, and historical information as entered above. Redd Cavanaugh MD    Objective   /82   Ht 172.7 cm (68\")   Wt 113 kg (249 lb)   Breastfeeding No   BMI 37.86 kg/m²     Physical Exam  Vitals and nursing note reviewed. Exam conducted with a chaperone present.   Constitutional:       Appearance: She is well-developed.   HENT:      Head: Normocephalic and atraumatic.   Neck:      Thyroid: No thyroid mass or thyromegaly.   Cardiovascular:      Rate and Rhythm: Normal rate and regular rhythm.      Heart sounds: No murmur.   Pulmonary:      Effort: Pulmonary effort is normal. No retractions.      Breath sounds: Normal breath sounds. No wheezing, rhonchi or rales.   Chest:      Chest wall: No mass or tenderness.      Breasts:         Right: Normal. No mass, nipple discharge, skin change or tenderness.         Left: Normal. No mass, nipple discharge, skin change or tenderness.   Abdominal:      General: Bowel sounds are normal.      Palpations: Abdomen is soft. Abdomen is not rigid. There is no mass.      Tenderness: There is no abdominal tenderness. There is no guarding.      Hernia: No hernia is present. There is no hernia in the left inguinal area.   Genitourinary:     Labia:         Right: No rash, tenderness or lesion.         " Left: No rash, tenderness or lesion.       Vagina: Normal. No vaginal discharge or lesions.      Cervix: No cervical motion tenderness, discharge, lesion or cervical bleeding.      Uterus: Normal. Not enlarged, not fixed and not tender.       Adnexa:         Right: No mass or tenderness.          Left: No mass or tenderness.        Rectum: No external hemorrhoid.   Musculoskeletal:      Cervical back: Normal range of motion. No muscular tenderness.   Neurological:      Mental Status: She is alert and oriented to person, place, and time.   Psychiatric:         Behavior: Behavior normal.            Assessment and Plan    Problem List Items Addressed This Visit     None      Visit Diagnoses     Pap test, as part of routine gynecological examination    -  Primary    Relevant Orders    Pap IG, Rfx HPV ASCU          1. GYN annual well woman exam.   2. Reviewed monthly self breast exams.  Instructed to call with lumps, pain, or breast discharge.  Yearly mammograms ordered.  3. Ordered mammogram today.  4. Recommended use of Vitamin D and getting adequate calcium in her diet. (1500mg)  5. Reviewed exercise as a preventative health measures.   6. Colonoscopy recommended.  7. Symptoms of menopausal transition reviewed with patient.   8. RTC in 1 year or PRN with problems.   9. Will have repeat total knee replacement    Redd Cavanaugh MD  03/12/2021

## 2021-03-13 ENCOUNTER — LAB (OUTPATIENT)
Dept: LAB | Facility: HOSPITAL | Age: 56
End: 2021-03-13

## 2021-03-13 PROCEDURE — U0004 COV-19 TEST NON-CDC HGH THRU: HCPCS

## 2021-03-13 PROCEDURE — C9803 HOPD COVID-19 SPEC COLLECT: HCPCS

## 2021-03-15 LAB — SARS-COV-2 RNA RESP QL NAA+PROBE: DETECTED

## 2021-03-16 DIAGNOSIS — Z01.419 PAP TEST, AS PART OF ROUTINE GYNECOLOGICAL EXAMINATION: ICD-10-CM

## 2021-04-01 ENCOUNTER — TRANSCRIBE ORDERS (OUTPATIENT)
Dept: SLEEP MEDICINE | Facility: HOSPITAL | Age: 56
End: 2021-04-01

## 2021-04-01 DIAGNOSIS — Z01.818 OTHER SPECIFIED PRE-OPERATIVE EXAMINATION: Primary | ICD-10-CM

## 2021-04-03 ENCOUNTER — LAB (OUTPATIENT)
Dept: LAB | Facility: HOSPITAL | Age: 56
End: 2021-04-03

## 2021-04-03 DIAGNOSIS — Z01.818 OTHER SPECIFIED PRE-OPERATIVE EXAMINATION: ICD-10-CM

## 2021-04-03 PROCEDURE — C9803 HOPD COVID-19 SPEC COLLECT: HCPCS

## 2021-04-03 PROCEDURE — U0004 COV-19 TEST NON-CDC HGH THRU: HCPCS

## 2021-04-05 LAB — SARS-COV-2 RNA RESP QL NAA+PROBE: NOT DETECTED

## 2021-04-06 ENCOUNTER — ANESTHESIA (OUTPATIENT)
Dept: PERIOP | Facility: HOSPITAL | Age: 56
End: 2021-04-06

## 2021-04-06 ENCOUNTER — ANESTHESIA EVENT (OUTPATIENT)
Dept: PERIOP | Facility: HOSPITAL | Age: 56
End: 2021-04-06

## 2021-04-06 ENCOUNTER — APPOINTMENT (OUTPATIENT)
Dept: GENERAL RADIOLOGY | Facility: HOSPITAL | Age: 56
End: 2021-04-06

## 2021-04-06 ENCOUNTER — HOSPITAL ENCOUNTER (INPATIENT)
Facility: HOSPITAL | Age: 56
LOS: 2 days | Discharge: HOME HEALTH | End: 2021-04-08
Attending: ORTHOPAEDIC SURGERY | Admitting: ORTHOPAEDIC SURGERY

## 2021-04-06 DIAGNOSIS — M25.362 INSTABILITY OF LEFT KNEE JOINT: ICD-10-CM

## 2021-04-06 DIAGNOSIS — Z96.652 STATUS POST REVISION OF TOTAL KNEE REPLACEMENT, LEFT: Primary | ICD-10-CM

## 2021-04-06 PROBLEM — T84.023A INSTABILITY OF INTERNAL LEFT KNEE PROSTHESIS: Status: RESOLVED | Noted: 2021-02-23 | Resolved: 2021-04-06

## 2021-04-06 PROCEDURE — 25010000002 FENTANYL CITRATE (PF) 100 MCG/2ML SOLUTION: Performed by: NURSE ANESTHETIST, CERTIFIED REGISTERED

## 2021-04-06 PROCEDURE — C1889 IMPLANT/INSERT DEVICE, NOC: HCPCS | Performed by: ORTHOPAEDIC SURGERY

## 2021-04-06 PROCEDURE — C1713 ANCHOR/SCREW BN/BN,TIS/BN: HCPCS | Performed by: ORTHOPAEDIC SURGERY

## 2021-04-06 PROCEDURE — C1776 JOINT DEVICE (IMPLANTABLE): HCPCS | Performed by: ORTHOPAEDIC SURGERY

## 2021-04-06 PROCEDURE — 87070 CULTURE OTHR SPECIMN AEROBIC: CPT | Performed by: ORTHOPAEDIC SURGERY

## 2021-04-06 PROCEDURE — 25010000003 BUPIVACAINE LIPOSOME 1.3 % SUSPENSION 20 ML VIAL: Performed by: ORTHOPAEDIC SURGERY

## 2021-04-06 PROCEDURE — 87176 TISSUE HOMOGENIZATION CULTR: CPT | Performed by: ORTHOPAEDIC SURGERY

## 2021-04-06 PROCEDURE — 25010000003 CEFAZOLIN IN DEXTROSE 2-4 GM/100ML-% SOLUTION: Performed by: ORTHOPAEDIC SURGERY

## 2021-04-06 PROCEDURE — 87015 SPECIMEN INFECT AGNT CONCNTJ: CPT | Performed by: ORTHOPAEDIC SURGERY

## 2021-04-06 PROCEDURE — 87205 SMEAR GRAM STAIN: CPT | Performed by: ORTHOPAEDIC SURGERY

## 2021-04-06 PROCEDURE — 76942 ECHO GUIDE FOR BIOPSY: CPT | Performed by: ORTHOPAEDIC SURGERY

## 2021-04-06 PROCEDURE — 25010000002 DEXAMETHASONE PER 1 MG: Performed by: NURSE ANESTHETIST, CERTIFIED REGISTERED

## 2021-04-06 PROCEDURE — 87075 CULTR BACTERIA EXCEPT BLOOD: CPT | Performed by: ORTHOPAEDIC SURGERY

## 2021-04-06 PROCEDURE — 25010000002 ONDANSETRON PER 1 MG: Performed by: NURSE ANESTHETIST, CERTIFIED REGISTERED

## 2021-04-06 PROCEDURE — 25010000002 MIDAZOLAM PER 1 MG: Performed by: ANESTHESIOLOGY

## 2021-04-06 PROCEDURE — 25010000002 FENTANYL CITRATE (PF) 100 MCG/2ML SOLUTION: Performed by: ANESTHESIOLOGY

## 2021-04-06 PROCEDURE — 25010000002 ROPIVACAINE PER 1 MG: Performed by: ANESTHESIOLOGY

## 2021-04-06 PROCEDURE — 73560 X-RAY EXAM OF KNEE 1 OR 2: CPT

## 2021-04-06 PROCEDURE — C9290 INJ, BUPIVACAINE LIPOSOME: HCPCS | Performed by: ORTHOPAEDIC SURGERY

## 2021-04-06 PROCEDURE — 0SPD0JZ REMOVAL OF SYNTHETIC SUBSTITUTE FROM LEFT KNEE JOINT, OPEN APPROACH: ICD-10-PCS | Performed by: ORTHOPAEDIC SURGERY

## 2021-04-06 PROCEDURE — 25010000002 PROPOFOL 10 MG/ML EMULSION: Performed by: NURSE ANESTHETIST, CERTIFIED REGISTERED

## 2021-04-06 PROCEDURE — 25010000002 HYDROMORPHONE PER 4 MG: Performed by: NURSE ANESTHETIST, CERTIFIED REGISTERED

## 2021-04-06 PROCEDURE — L1830 KO IMMOB CANVAS LONG PRE OTS: HCPCS | Performed by: ORTHOPAEDIC SURGERY

## 2021-04-06 PROCEDURE — 25010000002 VANCOMYCIN 10 G RECONSTITUTED SOLUTION: Performed by: ORTHOPAEDIC SURGERY

## 2021-04-06 PROCEDURE — 0SRD0J9 REPLACEMENT OF LEFT KNEE JOINT WITH SYNTHETIC SUBSTITUTE, CEMENTED, OPEN APPROACH: ICD-10-PCS | Performed by: ORTHOPAEDIC SURGERY

## 2021-04-06 DEVICE — IMPLANTABLE DEVICE: Type: IMPLANTABLE DEVICE | Site: KNEE | Status: FUNCTIONAL

## 2021-04-06 DEVICE — INSRT TIB/KN TRIATHLON PS X3 NMBR3 9MM: Type: IMPLANTABLE DEVICE | Site: KNEE | Status: FUNCTIONAL

## 2021-04-06 DEVICE — STEM FEM TRIATH CMT 12X50MM: Type: IMPLANTABLE DEVICE | Site: KNEE | Status: FUNCTIONAL

## 2021-04-06 DEVICE — CMT BONE SIMPLEX/P 1/2DOSE 10PK: Type: IMPLANTABLE DEVICE | Site: KNEE | Status: FUNCTIONAL

## 2021-04-06 DEVICE — EXT STEM TRIATH KN TOTL COCR FLUT 25X80MM: Type: IMPLANTABLE DEVICE | Site: KNEE | Status: FUNCTIONAL

## 2021-04-06 DEVICE — COMP FEM TRIATH TS SZ4 LT: Type: IMPLANTABLE DEVICE | Site: KNEE | Status: FUNCTIONAL

## 2021-04-06 DEVICE — FULL DOSE BONE CEMENT, 10 PACK CATALOG NUMBER IS 6191-1-010
Type: IMPLANTABLE DEVICE | Site: KNEE | Status: FUNCTIONAL
Brand: SIMPLEX

## 2021-04-06 DEVICE — BASEPLT TIB TRIATH TS NO3: Type: IMPLANTABLE DEVICE | Site: KNEE | Status: FUNCTIONAL

## 2021-04-06 DEVICE — VIOLET ANTIBACTERIAL POLYDIOXANONE, KNOTLESS TISSUE CONTROL DEVICE
Type: IMPLANTABLE DEVICE | Site: KNEE | Status: FUNCTIONAL
Brand: STRATAFIX

## 2021-04-06 RX ORDER — NALOXONE HCL 0.4 MG/ML
0.2 VIAL (ML) INJECTION AS NEEDED
Status: DISCONTINUED | OUTPATIENT
Start: 2021-04-06 | End: 2021-04-06 | Stop reason: HOSPADM

## 2021-04-06 RX ORDER — HYDROCODONE BITARTRATE AND ACETAMINOPHEN 7.5; 325 MG/1; MG/1
1 TABLET ORAL EVERY 4 HOURS PRN
Status: DISCONTINUED | OUTPATIENT
Start: 2021-04-06 | End: 2021-04-08 | Stop reason: HOSPADM

## 2021-04-06 RX ORDER — FLUMAZENIL 0.1 MG/ML
0.2 INJECTION INTRAVENOUS AS NEEDED
Status: DISCONTINUED | OUTPATIENT
Start: 2021-04-06 | End: 2021-04-06 | Stop reason: HOSPADM

## 2021-04-06 RX ORDER — HYDROMORPHONE HYDROCHLORIDE 1 MG/ML
0.5 INJECTION, SOLUTION INTRAMUSCULAR; INTRAVENOUS; SUBCUTANEOUS
Status: DISCONTINUED | OUTPATIENT
Start: 2021-04-06 | End: 2021-04-08 | Stop reason: HOSPADM

## 2021-04-06 RX ORDER — SODIUM CHLORIDE 0.9 % (FLUSH) 0.9 %
3 SYRINGE (ML) INJECTION EVERY 12 HOURS SCHEDULED
Status: DISCONTINUED | OUTPATIENT
Start: 2021-04-06 | End: 2021-04-06 | Stop reason: HOSPADM

## 2021-04-06 RX ORDER — ONDANSETRON 4 MG/1
4 TABLET, FILM COATED ORAL EVERY 6 HOURS PRN
Status: DISCONTINUED | OUTPATIENT
Start: 2021-04-06 | End: 2021-04-08 | Stop reason: HOSPADM

## 2021-04-06 RX ORDER — ACETAMINOPHEN 325 MG/1
325 TABLET ORAL EVERY 4 HOURS PRN
Status: DISCONTINUED | OUTPATIENT
Start: 2021-04-06 | End: 2021-04-08 | Stop reason: HOSPADM

## 2021-04-06 RX ORDER — ASPIRIN 81 MG/1
81 TABLET ORAL EVERY 12 HOURS SCHEDULED
Status: DISCONTINUED | OUTPATIENT
Start: 2021-04-06 | End: 2021-04-08 | Stop reason: HOSPADM

## 2021-04-06 RX ORDER — ONDANSETRON 2 MG/ML
INJECTION INTRAMUSCULAR; INTRAVENOUS AS NEEDED
Status: DISCONTINUED | OUTPATIENT
Start: 2021-04-06 | End: 2021-04-06 | Stop reason: SURG

## 2021-04-06 RX ORDER — BISACODYL 5 MG/1
10 TABLET, DELAYED RELEASE ORAL DAILY PRN
Status: DISCONTINUED | OUTPATIENT
Start: 2021-04-07 | End: 2021-04-08 | Stop reason: HOSPADM

## 2021-04-06 RX ORDER — PROMETHAZINE HYDROCHLORIDE 25 MG/1
25 SUPPOSITORY RECTAL ONCE AS NEEDED
Status: DISCONTINUED | OUTPATIENT
Start: 2021-04-06 | End: 2021-04-06 | Stop reason: HOSPADM

## 2021-04-06 RX ORDER — LABETALOL HYDROCHLORIDE 5 MG/ML
5 INJECTION, SOLUTION INTRAVENOUS
Status: DISCONTINUED | OUTPATIENT
Start: 2021-04-06 | End: 2021-04-06 | Stop reason: HOSPADM

## 2021-04-06 RX ORDER — DOCUSATE SODIUM 100 MG/1
100 CAPSULE, LIQUID FILLED ORAL 2 TIMES DAILY
Status: DISCONTINUED | OUTPATIENT
Start: 2021-04-06 | End: 2021-04-08 | Stop reason: HOSPADM

## 2021-04-06 RX ORDER — HYDROCODONE BITARTRATE AND ACETAMINOPHEN 7.5; 325 MG/1; MG/1
2 TABLET ORAL EVERY 4 HOURS PRN
Status: DISCONTINUED | OUTPATIENT
Start: 2021-04-06 | End: 2021-04-08 | Stop reason: HOSPADM

## 2021-04-06 RX ORDER — FAMOTIDINE 10 MG/ML
20 INJECTION, SOLUTION INTRAVENOUS ONCE
Status: DISCONTINUED | OUTPATIENT
Start: 2021-04-06 | End: 2021-04-06 | Stop reason: HOSPADM

## 2021-04-06 RX ORDER — ROPIVACAINE HYDROCHLORIDE 5 MG/ML
INJECTION, SOLUTION EPIDURAL; INFILTRATION; PERINEURAL
Status: COMPLETED | OUTPATIENT
Start: 2021-04-06 | End: 2021-04-06

## 2021-04-06 RX ORDER — FENTANYL CITRATE 50 UG/ML
50 INJECTION, SOLUTION INTRAMUSCULAR; INTRAVENOUS
Status: DISCONTINUED | OUTPATIENT
Start: 2021-04-06 | End: 2021-04-06 | Stop reason: HOSPADM

## 2021-04-06 RX ORDER — CEFAZOLIN SODIUM 2 G/100ML
2 INJECTION, SOLUTION INTRAVENOUS ONCE
Status: COMPLETED | OUTPATIENT
Start: 2021-04-06 | End: 2021-04-06

## 2021-04-06 RX ORDER — MIDAZOLAM HYDROCHLORIDE 1 MG/ML
2 INJECTION INTRAMUSCULAR; INTRAVENOUS
Status: DISCONTINUED | OUTPATIENT
Start: 2021-04-06 | End: 2021-04-06 | Stop reason: HOSPADM

## 2021-04-06 RX ORDER — ONDANSETRON 2 MG/ML
4 INJECTION INTRAMUSCULAR; INTRAVENOUS ONCE AS NEEDED
Status: DISCONTINUED | OUTPATIENT
Start: 2021-04-06 | End: 2021-04-06 | Stop reason: HOSPADM

## 2021-04-06 RX ORDER — CELECOXIB 200 MG/1
200 CAPSULE ORAL 2 TIMES DAILY
Status: DISCONTINUED | OUTPATIENT
Start: 2021-04-06 | End: 2021-04-08 | Stop reason: HOSPADM

## 2021-04-06 RX ORDER — DULOXETIN HYDROCHLORIDE 60 MG/1
60 CAPSULE, DELAYED RELEASE ORAL NIGHTLY
Status: DISCONTINUED | OUTPATIENT
Start: 2021-04-06 | End: 2021-04-08 | Stop reason: HOSPADM

## 2021-04-06 RX ORDER — LIDOCAINE HYDROCHLORIDE 10 MG/ML
0.5 INJECTION, SOLUTION EPIDURAL; INFILTRATION; INTRACAUDAL; PERINEURAL ONCE AS NEEDED
Status: DISCONTINUED | OUTPATIENT
Start: 2021-04-06 | End: 2021-04-06 | Stop reason: HOSPADM

## 2021-04-06 RX ORDER — SCOLOPAMINE TRANSDERMAL SYSTEM 1 MG/1
1 PATCH, EXTENDED RELEASE TRANSDERMAL ONCE
Status: COMPLETED | OUTPATIENT
Start: 2021-04-06 | End: 2021-04-07

## 2021-04-06 RX ORDER — TRANEXAMIC ACID 100 MG/ML
INJECTION, SOLUTION INTRAVENOUS AS NEEDED
Status: DISCONTINUED | OUTPATIENT
Start: 2021-04-06 | End: 2021-04-06 | Stop reason: SURG

## 2021-04-06 RX ORDER — DIPHENHYDRAMINE HCL 25 MG
25 CAPSULE ORAL
Status: DISCONTINUED | OUTPATIENT
Start: 2021-04-06 | End: 2021-04-06 | Stop reason: HOSPADM

## 2021-04-06 RX ORDER — SODIUM CHLORIDE 0.9 % (FLUSH) 0.9 %
3-10 SYRINGE (ML) INJECTION AS NEEDED
Status: DISCONTINUED | OUTPATIENT
Start: 2021-04-06 | End: 2021-04-06 | Stop reason: HOSPADM

## 2021-04-06 RX ORDER — SODIUM CHLORIDE, SODIUM LACTATE, POTASSIUM CHLORIDE, CALCIUM CHLORIDE 600; 310; 30; 20 MG/100ML; MG/100ML; MG/100ML; MG/100ML
9 INJECTION, SOLUTION INTRAVENOUS CONTINUOUS
Status: DISCONTINUED | OUTPATIENT
Start: 2021-04-06 | End: 2021-04-07 | Stop reason: HOSPADM

## 2021-04-06 RX ORDER — DIPHENHYDRAMINE HYDROCHLORIDE 50 MG/ML
12.5 INJECTION INTRAMUSCULAR; INTRAVENOUS
Status: DISCONTINUED | OUTPATIENT
Start: 2021-04-06 | End: 2021-04-06 | Stop reason: HOSPADM

## 2021-04-06 RX ORDER — OXYCODONE AND ACETAMINOPHEN 7.5; 325 MG/1; MG/1
1 TABLET ORAL ONCE AS NEEDED
Status: DISCONTINUED | OUTPATIENT
Start: 2021-04-06 | End: 2021-04-06 | Stop reason: HOSPADM

## 2021-04-06 RX ORDER — PROPOFOL 10 MG/ML
VIAL (ML) INTRAVENOUS AS NEEDED
Status: DISCONTINUED | OUTPATIENT
Start: 2021-04-06 | End: 2021-04-06 | Stop reason: SURG

## 2021-04-06 RX ORDER — SODIUM CHLORIDE 9 MG/ML
100 INJECTION, SOLUTION INTRAVENOUS CONTINUOUS
Status: DISCONTINUED | OUTPATIENT
Start: 2021-04-06 | End: 2021-04-07

## 2021-04-06 RX ORDER — HYDRALAZINE HYDROCHLORIDE 20 MG/ML
5 INJECTION INTRAMUSCULAR; INTRAVENOUS
Status: DISCONTINUED | OUTPATIENT
Start: 2021-04-06 | End: 2021-04-06 | Stop reason: HOSPADM

## 2021-04-06 RX ORDER — MIDAZOLAM HYDROCHLORIDE 1 MG/ML
1 INJECTION INTRAMUSCULAR; INTRAVENOUS
Status: DISCONTINUED | OUTPATIENT
Start: 2021-04-06 | End: 2021-04-06 | Stop reason: HOSPADM

## 2021-04-06 RX ORDER — ONDANSETRON 2 MG/ML
4 INJECTION INTRAMUSCULAR; INTRAVENOUS EVERY 6 HOURS PRN
Status: DISCONTINUED | OUTPATIENT
Start: 2021-04-06 | End: 2021-04-08 | Stop reason: HOSPADM

## 2021-04-06 RX ORDER — EPHEDRINE SULFATE 50 MG/ML
5 INJECTION, SOLUTION INTRAVENOUS ONCE AS NEEDED
Status: DISCONTINUED | OUTPATIENT
Start: 2021-04-06 | End: 2021-04-06 | Stop reason: HOSPADM

## 2021-04-06 RX ORDER — HYDROMORPHONE HCL 110MG/55ML
PATIENT CONTROLLED ANALGESIA SYRINGE INTRAVENOUS AS NEEDED
Status: DISCONTINUED | OUTPATIENT
Start: 2021-04-06 | End: 2021-04-06 | Stop reason: SURG

## 2021-04-06 RX ORDER — UREA 10 %
1 LOTION (ML) TOPICAL NIGHTLY PRN
Status: DISCONTINUED | OUTPATIENT
Start: 2021-04-06 | End: 2021-04-08 | Stop reason: HOSPADM

## 2021-04-06 RX ORDER — PROMETHAZINE HYDROCHLORIDE 25 MG/1
25 TABLET ORAL ONCE AS NEEDED
Status: DISCONTINUED | OUTPATIENT
Start: 2021-04-06 | End: 2021-04-06 | Stop reason: HOSPADM

## 2021-04-06 RX ORDER — HYDROCODONE BITARTRATE AND ACETAMINOPHEN 7.5; 325 MG/1; MG/1
1 TABLET ORAL ONCE AS NEEDED
Status: DISCONTINUED | OUTPATIENT
Start: 2021-04-06 | End: 2021-04-06 | Stop reason: HOSPADM

## 2021-04-06 RX ORDER — FAMOTIDINE 10 MG/ML
20 INJECTION, SOLUTION INTRAVENOUS ONCE
Status: COMPLETED | OUTPATIENT
Start: 2021-04-06 | End: 2021-04-06

## 2021-04-06 RX ORDER — ACETAMINOPHEN 500 MG
1000 TABLET ORAL ONCE
Status: COMPLETED | OUTPATIENT
Start: 2021-04-06 | End: 2021-04-06

## 2021-04-06 RX ORDER — DEXAMETHASONE SODIUM PHOSPHATE 10 MG/ML
INJECTION INTRAMUSCULAR; INTRAVENOUS AS NEEDED
Status: DISCONTINUED | OUTPATIENT
Start: 2021-04-06 | End: 2021-04-06 | Stop reason: SURG

## 2021-04-06 RX ORDER — FENTANYL CITRATE 50 UG/ML
INJECTION, SOLUTION INTRAMUSCULAR; INTRAVENOUS AS NEEDED
Status: DISCONTINUED | OUTPATIENT
Start: 2021-04-06 | End: 2021-04-06 | Stop reason: SURG

## 2021-04-06 RX ORDER — SODIUM CHLORIDE 9 MG/ML
INJECTION, SOLUTION INTRAVENOUS AS NEEDED
Status: DISCONTINUED | OUTPATIENT
Start: 2021-04-06 | End: 2021-04-06 | Stop reason: HOSPADM

## 2021-04-06 RX ORDER — NALOXONE HCL 0.4 MG/ML
0.1 VIAL (ML) INJECTION
Status: DISCONTINUED | OUTPATIENT
Start: 2021-04-06 | End: 2021-04-08 | Stop reason: HOSPADM

## 2021-04-06 RX ORDER — CEFAZOLIN SODIUM 2 G/100ML
2 INJECTION, SOLUTION INTRAVENOUS EVERY 8 HOURS
Status: COMPLETED | OUTPATIENT
Start: 2021-04-06 | End: 2021-04-07

## 2021-04-06 RX ORDER — LIDOCAINE HYDROCHLORIDE 20 MG/ML
INJECTION, SOLUTION INFILTRATION; PERINEURAL AS NEEDED
Status: DISCONTINUED | OUTPATIENT
Start: 2021-04-06 | End: 2021-04-06 | Stop reason: SURG

## 2021-04-06 RX ORDER — HYDROMORPHONE HYDROCHLORIDE 1 MG/ML
0.5 INJECTION, SOLUTION INTRAMUSCULAR; INTRAVENOUS; SUBCUTANEOUS
Status: DISCONTINUED | OUTPATIENT
Start: 2021-04-06 | End: 2021-04-06 | Stop reason: HOSPADM

## 2021-04-06 RX ORDER — MAGNESIUM HYDROXIDE 1200 MG/15ML
LIQUID ORAL AS NEEDED
Status: DISCONTINUED | OUTPATIENT
Start: 2021-04-06 | End: 2021-04-06 | Stop reason: HOSPADM

## 2021-04-06 RX ADMIN — FAMOTIDINE 20 MG: 10 INJECTION INTRAVENOUS at 12:24

## 2021-04-06 RX ADMIN — FENTANYL CITRATE 50 MCG: 50 INJECTION, SOLUTION INTRAMUSCULAR; INTRAVENOUS at 16:30

## 2021-04-06 RX ADMIN — CEFAZOLIN SODIUM 2 G: 2 INJECTION, SOLUTION INTRAVENOUS at 20:13

## 2021-04-06 RX ADMIN — MIDAZOLAM 2 MG: 1 INJECTION INTRAMUSCULAR; INTRAVENOUS at 12:42

## 2021-04-06 RX ADMIN — CELECOXIB 200 MG: 200 CAPSULE ORAL at 20:13

## 2021-04-06 RX ADMIN — LIDOCAINE HYDROCHLORIDE 100 MG: 20 INJECTION, SOLUTION INFILTRATION; PERINEURAL at 13:15

## 2021-04-06 RX ADMIN — SODIUM CHLORIDE, POTASSIUM CHLORIDE, SODIUM LACTATE AND CALCIUM CHLORIDE 9 ML/HR: 600; 310; 30; 20 INJECTION, SOLUTION INTRAVENOUS at 12:26

## 2021-04-06 RX ADMIN — PROPOFOL 200 MG: 10 INJECTION, EMULSION INTRAVENOUS at 13:15

## 2021-04-06 RX ADMIN — FENTANYL CITRATE 50 MCG: 50 INJECTION, SOLUTION INTRAMUSCULAR; INTRAVENOUS at 16:57

## 2021-04-06 RX ADMIN — ROPIVACAINE HYDROCHLORIDE 30 ML: 5 INJECTION, SOLUTION EPIDURAL; INFILTRATION; PERINEURAL at 12:58

## 2021-04-06 RX ADMIN — HYDROMORPHONE HYDROCHLORIDE 0.5 MG: 1 INJECTION, SOLUTION INTRAMUSCULAR; INTRAVENOUS; SUBCUTANEOUS at 17:22

## 2021-04-06 RX ADMIN — DULOXETINE HYDROCHLORIDE 60 MG: 60 CAPSULE, DELAYED RELEASE ORAL at 20:13

## 2021-04-06 RX ADMIN — ASPIRIN 81 MG: 81 TABLET, COATED ORAL at 20:13

## 2021-04-06 RX ADMIN — FENTANYL CITRATE 50 MCG: 50 INJECTION, SOLUTION INTRAMUSCULAR; INTRAVENOUS at 12:50

## 2021-04-06 RX ADMIN — DOCUSATE SODIUM 100 MG: 100 CAPSULE, LIQUID FILLED ORAL at 20:13

## 2021-04-06 RX ADMIN — ONDANSETRON 4 MG: 2 INJECTION INTRAMUSCULAR; INTRAVENOUS at 13:15

## 2021-04-06 RX ADMIN — DEXAMETHASONE SODIUM PHOSPHATE 8 MG: 10 INJECTION INTRAMUSCULAR; INTRAVENOUS at 13:15

## 2021-04-06 RX ADMIN — FENTANYL CITRATE 50 MCG: 50 INJECTION INTRAMUSCULAR; INTRAVENOUS at 13:39

## 2021-04-06 RX ADMIN — SCOLOPAMINE TRANSDERMAL SYSTEM 1 PATCH: 1 PATCH, EXTENDED RELEASE TRANSDERMAL at 12:38

## 2021-04-06 RX ADMIN — HYDROMORPHONE HYDROCHLORIDE 0.5 MG: 1 INJECTION, SOLUTION INTRAMUSCULAR; INTRAVENOUS; SUBCUTANEOUS at 16:41

## 2021-04-06 RX ADMIN — FENTANYL CITRATE 50 MCG: 50 INJECTION, SOLUTION INTRAMUSCULAR; INTRAVENOUS at 12:42

## 2021-04-06 RX ADMIN — TRANEXAMIC ACID 1000 MG: 1 INJECTION, SOLUTION INTRAVENOUS at 13:42

## 2021-04-06 RX ADMIN — ACETAMINOPHEN 1000 MG: 500 TABLET, FILM COATED ORAL at 12:23

## 2021-04-06 RX ADMIN — SODIUM CHLORIDE, POTASSIUM CHLORIDE, SODIUM LACTATE AND CALCIUM CHLORIDE: 600; 310; 30; 20 INJECTION, SOLUTION INTRAVENOUS at 13:09

## 2021-04-06 RX ADMIN — HYDROMORPHONE HYDROCHLORIDE 0.5 MG: 2 INJECTION, SOLUTION INTRAMUSCULAR; INTRAVENOUS; SUBCUTANEOUS at 14:13

## 2021-04-06 RX ADMIN — VANCOMYCIN HYDROCHLORIDE 1500 MG: 10 INJECTION, POWDER, LYOPHILIZED, FOR SOLUTION INTRAVENOUS at 12:15

## 2021-04-06 RX ADMIN — ROPIVACAINE HYDROCHLORIDE 30 ML: 5 INJECTION, SOLUTION EPIDURAL; INFILTRATION; PERINEURAL at 13:00

## 2021-04-06 RX ADMIN — HYDROCODONE BITARTRATE AND ACETAMINOPHEN 2 TABLET: 7.5; 325 TABLET ORAL at 20:12

## 2021-04-06 RX ADMIN — FENTANYL CITRATE 50 MCG: 50 INJECTION INTRAMUSCULAR; INTRAVENOUS at 13:43

## 2021-04-06 RX ADMIN — HYDROMORPHONE HYDROCHLORIDE 0.5 MG: 2 INJECTION, SOLUTION INTRAMUSCULAR; INTRAVENOUS; SUBCUTANEOUS at 13:59

## 2021-04-06 RX ADMIN — CEFAZOLIN SODIUM 2 G: 2 INJECTION, SOLUTION INTRAVENOUS at 13:03

## 2021-04-06 NOTE — H&P
MARYBEL GHOTRA is a 55 year old female who presents to the office today for follow-up of pain and swelling in the left knee which has been present for 3 years.    She underwent aspiration of the left knee in the previous office visit and is here to discuss the results.  She would like to proceed with revision left knee replacement and is here for preoperative discussion.  She had a L TKA done by Dr. Link on 3/16/2012 at  in [place].  The patient denies an injury specifically to the left knee.  The patient is not on pain meds and only takes cymbalta and antiinflammatories for the knee pain.  The patient states that she is having a sharp and dull pain which she rates at a 5 on a scale from 1-10.  The pain occurs intermittently and at nights which is causing her not to be able to lay on the left side.  The pain is located on the side of the knee both inside and outside.  The pain occurs on the left knee only.  The pain is not radiating.  The patient states that immobilization makes it somewhat better, while bending, standing and walking makes it worse.  She has not had conservative management for her left knee pain. She denies any immediate post-operative issues after her initial TKA in 2012. She states her surgeon in [place] is now retired.  She denies instability at the left knee but that she has intermittent swelling.  Denies redness or warmth of her knee.  Denies VTE history  Denies MRSA infections  Has a history of right TKA by Dr. Cuellar and bilateral KELLEE's by Dr. Be  Review of Systems:  Positive for: Decreased Motion, Joint Pain, Poor Balance and Weakness.    Patient denies: Abdominal Pain, Bleeding, Chest Pain, Convulsions/Seizure, Depression, Difficulty Swallowing, Easy Bruisability, Emotional Disturbances, Eyes or Vision Problems, Fecal Incontinence, Fever/Chills, Headaches, Increased Thirst, Increased Hunger, Insomnia, Nausea/Vomiting, Night Sweats, Persistent Cough, Rash, Shortness of Breath,  Shortness of Breath While Lying down, Skin Problems and Urinary Retention.  Allergies:  * penicillins (critical)  * cephro (critical)  Medications:  bisacodyl ec 5 mg oral tablet delayed release (bisacodyl) 2 tab daily prn constipation; route: oral  zofran 4 mg oral tablet (ondansetron hcl) 1 po q 8hrs prn nausea; route: oral  colace 100 mg oral capsule (docusate sodium) 1 po q 12hrs; route: oral  aspir-low 81 mg oral tablet delayed release (aspirin) 1 tab bid x 30 days; route: oral  mupirocin 2 % external ointment (mupirocin) apply 1/2 tube in each nare bid the day before surgery and the repeat the morning of surgery; route: external  tramadol hcl 50 mg oral tablet (tramadol hcl) 1-2 po bid prn; route: oral  mupirocin 2 % external ointment (mupirocin) apply small amount to each nare day before surgery and a.m. of surgery; route: external  celebrex capsule (celecoxib caps)   acyclovir capsule (acyclovir caps)   Patient History of:  OSTEOARTHRITIS  BLOOD CLOTS/EMBOLISM - NEGATIVE  MENOPAUSE  Surgical History:  left Total Knee Arthroplasty-[CPT-85894]   right Total Hip Arthroplasty-[CPT-72339]   Gallbladder/Cholecystectomy-[CPT-34781]   Known Family History of:  heart disease-father  diabetes-father  Past medical, social, family histories and ROS reviewed today with the patient and changes documented in the chart (02/23/2021).  PCP Dr. PO TORO, CANDIE    Physical Exam  Height:  67 in.    Weight:  250 lbs.     BMI:  39.30      Gait: normal               Ambulation: patient ambulates without assistive devices      Mental/HEENT/Cardio/Skin  The patient's general appearance is well developed, well nourished, no acute distress.  Orientation is alert and oriented x 3.  The patient's mood is normal.  A head exam reveals normocephalic/atraumatic.  An eye exam reveals pupils equal.  Pulmonary exam shows normal air exchange, no labored breathing, or shortness of breath.  A skin exam shows normal temperature and color in  the area of examination.      Right Knee      Left Knee  There is a mature midline anterior scar. incision: Clean, dry, and intact.  No signs of infection.  Neutral alignment.  There is no atrophy.  Effusion is 1+.  No warmth.  No erythema.  Patient shows no signs of DVT.  The DP Pulse is 2+.  Range of motion of the knee is 0 to 120 degrees of flexion.  There is no tenderness in the knee.  Patellar tracking is normal.  Patellar crepitation is not present.  Crepitus is not present.  Anterior drawer grade 1.  Posterior drawer grade 1.  Valgus grade 1.  Varus grade 1.        Imaging/Diagnostic Studies    X-rays of the left kneeX-rays show an uncemented carito nexgen total knee arthroplasty prosthesis that appears well fixed and in satisfactory alignment, There is no evidence of loosening, The total knee arthroplasty shows no wear and The total knee arthoplasty shows no osteolysis.    Impression  Left knee pain (EQK64-M63.562)  Left knee chronic instability (MGD57-M67.52)    Plan  She demonstrates global instability of the left knee with flexion at 90 degrees.  Her left knee fluid analysis was negative for crystals, no growth, total cell count was less than 300 with more lymphocytes.  Options and alternatives were discussed in detail with the patient.   The patient has reached a point of disability and has failed nonoperative management. The patient is indicated for a revision left knee arthroplasty.     Likely, Risks and benefits of the procedure including but not limited to infection, DVT, pulmonary embolism, stiffness, future loosening of the implants, possibility of injury to nerves vessels and tendons, periprosthetic fractures have been discussed in detail. Despite the risks involved, The patient would like to proceed.   The patient is being scheduled for a revision left knee arthroplasty at [facility] on March 16 , 2021.   Postoperative DVT prophylaxis - Patient has no high risk factors Plan for ASPIRIN.    Preoperative antibiotic prophylaxis - Plan for SCIP protocol with CEFAZOLIN weight based. Will give VANCOMYCIN in addition due to increased BMI and revision surgery.   Surgery will be scheduled   for inpatient status due to medical comorbidities.    Patient does not use tobacco.        We discussed the benefits of surgical intervention, as well as alternative treatments.  Potential surgical risks and complications include but are not limited to DVT, infection, neurovascular injury, fracture, implant wear, failure, possible need for revision surgery, loss of motion, dislocation, limb length changes.  Sufficient opportunity was given to discuss the condition and treatment plan with the doctor, and all questions were answered for the patient.  Nonsurgical measures such as injections, medications, or physical therapy may not offer significant relief to this patient.  The discussion lasted 30 minutes.  GRACE agreed to proceed with the surgery.      Grace should follow up with MARY ABRAMS MD post op.

## 2021-04-06 NOTE — SIGNIFICANT NOTE
Attempted to contact and update  Ant at listed number [phone], NO ANSWER AND NO VOICEMAIL pickup, will re-attempt later

## 2021-04-06 NOTE — ANESTHESIA PROCEDURE NOTES
Airway  Urgency: elective    Date/Time: 4/6/2021 1:16 PM    General Information and Staff    Patient location during procedure: OR  CRNA: Jojo Araujo CRNA    Indications and Patient Condition  Indications for airway management: airway protection    Preoxygenated: yes  Mask difficulty assessment: 0 - not attempted    Final Airway Details  Final airway type: supraglottic airway      Successful airway: unique  Size 4    Number of attempts at approach: 1    Additional Comments  Atraumatic placement of Unique LMA. Cuff  WNL. Dentition unchanged from preop.

## 2021-04-06 NOTE — PERIOPERATIVE NURSING NOTE
at bedside to verify side of knee revision. Confirmed per MD and patient left knee revision today. Patient marked

## 2021-04-06 NOTE — SIGNIFICANT NOTE
Ant updated on patient status and room assignment, questions encouraged. Directed to patient ready room

## 2021-04-06 NOTE — ANESTHESIA PREPROCEDURE EVALUATION
Anesthesia Evaluation     history of anesthetic complications: PONV  NPO Solid Status: > 8 hours             Airway   Mallampati: I  TM distance: >3 FB  Neck ROM: full  Dental - normal exam     Pulmonary - normal exam   (-) asthma  Cardiovascular - normal exam    (-) hypertension, past MI      Neuro/Psych  GI/Hepatic/Renal/Endo    (+) obesity,       Musculoskeletal     Abdominal    Substance History      OB/GYN          Other                        Anesthesia Plan    ASA 2     general   (Adductor canal and ipack blocks per dr. riley's request for postoperative pain control)  intravenous induction     Anesthetic plan, all risks, benefits, and alternatives have been provided, discussed and informed consent has been obtained with: patient.

## 2021-04-06 NOTE — OP NOTE
ORTHOPAEDIC OPERATIVE NOTE    Patient: Grace Jacob       YOB: 1965    Medical Record Number: [id number]    Attending Physician: Mary Be,*    Primary Care Physician: Tiffanie Dickson PA    Date of Service: 4/6/2021    Surgeon: Mary Be MD        DATE OF PROCEDURE: 4/6/2021    PREOPERATIVE DIAGNOSIS: Instability of internal left knee prosthesis, initial encounter     1. Failed total knee arthroplasty LEFT knee -global instability both in flexion and extension.   2.  Pain left knee on resurfaced patella    POSTOPERATIVE DIAGNOSIS: Instability of internal left knee prosthesis, initial encounter     1. Failed total knee arthroplasty LEFT knee -global instability both in flexion and extension.   2.  Pain left knee on resurfaced patella    PROCEDURE PERFORMED: LEFT KNEE REVISION  1.   Revision LEFT total knee arthroplasty by utilizing the walin revision system with a     Tibia  Universal  baseplate size 3 -   Tibia  Augments 10 mm  Medial and lateral  Tibia Cone Symmetric Size c ,   Cemented stem fluted 12 mm × 50 mm with 25 mm extender for tibia.  Femur    The total stabilizer component size 4,   Distal femur augments 5 mm thickness  Medial and lateral ,  fluted stem 14 mm ×100 mm length for the femur.    Posterior stabilized  tibial insert size # 3, 9 mm thickness.    Bone cement   was utilized for additional stabilization of the implants.    Implant Name Type Inv. Item Serial No.  Lot No. LRB No. Used Action   SUT CONTRL TISS STRATAFIX PDS PLS OS6 REV SZ1 18IN 45CM - [id number] Implant SUT CONTRL TISS STRATAFIX PDS PLS OS6 REV SZ1 18IN 45CM  [employer]  DIV OF J AND J [id number] Left 1 Implanted   CMT BONE SIMPLEX/P 1/2DOSE 10PK - [id number] Implant CMT BONE SIMPLEX/P 1/2DOSE 10PK  OneSpot [id number] Left 1 Implanted   CMT BONE SIMPLEX/P FULL DOSE 10/PK - [id number] Implant CMT BONE SIMPLEX/P FULL DOSE 10/PK  Superbly  AUDELIA [id number] Left 1 Implanted   CMT BONE SIMPLEX/P FULL DOSE 10/PK - [id number] Implant CMT BONE SIMPLEX/P FULL DOSE 10/PK  GUILLERMINA AUDELIA [id number] Left 2 Implanted   STEM CMTLS FEM/KN TRIATHLON TS W/ENDCAP [id number] - [id number] Implant STEM CMTLS FEM/KN TRIATHLON TS W/ENDCAP [id number]  GUILLERMINA AUDELIA [id number] Left 1 Implanted   AUG FEM/KN TRIATHLON TOTLSTBL DIST SZ4 5MM LT - [id number] Implant AUG FEM/KN TRIATHLON TOTLSTBL DIST SZ4 5MM LT  GUILLERMINA [employer] [id number] Left 1 Implanted   AUG FEM/KN TRIATHLON TOTLSTBL DIST SZ4 5MM LT - [id number] Implant AUG FEM/KN TRIATHLON TOTLSTBL DIST SZ4 5MM LT  GUILLERMINA [employer] [id number] Left 1 Implanted   COMP FEM TRIATH TS SZ4 LT - [id number] Implant COMP FEM TRIATH TS SZ4 LT  GUILLERMINA [employer] [id number] Left 1 Implanted   STEM FEM TRIATH CMT [id number] - [id number] Implant STEM FEM TRIATH CMT [id number]  GUILLERMINA [employer] [id number] Left 1 Implanted   EXT STEM TRIATH KN TOTL COCR FLUT [id number] - [id number] Implant EXT STEM TRIATH KN TOTL COCR FLUT [id number]  GUILLERMINA [employer] [id number] Left 1 Implanted   AUG CONE TIB/KN TRIATHLON REV SYMM SZC - [id number] Implant AUG CONE TIB/KN TRIATHLON REV SYMM SZC  GUILLERMINA AUDELIA [id number] Left 1 Implanted   AUG TIB/KN TRIATH 1/2BLCK LM/RL SZ3 10MM - [id number] Implant AUG TIB/KN TRIATH 1/2BLCK LM/RL SZ3 10MM  GUILLERMINA [employer] [id number] Left 1 Implanted   AUG TIB/KN TRIATH 1/2BLCK LL/RM SZ3 10MM - [id number] Implant AUG TIB/KN TRIATH 1/2BLCK LL/RM SZ3 10MM  GUILLERMINA [employer] [id number] Left 1 Implanted   BASEPLT TIB TRIATH TS NO3 - [id number] Implant BASEPLT TIB TRIATH TS NO3  GUILLERMINA AUDELIA [id number] Left 1 Implanted   PAT TRIATH ASYM X3 9X29MM - [id number] Implant PAT TRIATH ASYM X3 9X29MM  GUILLERMINA AUDELIA [id number] Left 1 Implanted   INSRT TIB/KN TRIATHLON PS X3 NMBR3 9MM - [id number] Implant INSRT TIB/KN TRIATHLON PS X3 NMBR3 9MM  GUILLERMINA AUDELIA [id number] Left 1 Implanted       SURGEON: Mary Be MD     ASSISTANT: Armando Cotter DO Fellow     P[facility]rez Noland CSA [facility]     The services of a skilled  first assist were  necessary for performing the procedure safely and expeditiously.  The first assist was present for the entire duration of the case and helped with positioning, retraction and closure of the incision.      ANESTHESIA: General anaesthesia with a regional block femoral-sciatic and intraoperative periarticular  Exparel injection.    ESTIMATED BLOOD LOSS: 100 mL    SPECIMENS: 1.Tissue from  knee for culture sensitivity aerobic and anaerobic.      COMPLICATIONS: Nil.     DRAINS: A 10 Colombian Hemovac drain.     INDICATIONS: The patient is a 56 y.o. female  who presented to my office with complaints of progressively worsening pain in her knee. The patient  has reached a point of disability. The patient has a history of left total knee arthroplasty about 10 years back by Dr Link.  She had well fixed implants.  She had recurrent effusions.  Fluid from the left knee was negative for any infection.  She had global instability in flexion both varus valgus and anteroposterior laxity.  She had medial lateral instability in extension.    Treatment options and alternatives were discussed in detail with the patient who is indicated for a revision total knee arthroplasty.     Likely risks and benefits of the procedure, including but not limited to infection, DVT, pulmonary embolism, future loosening of the implants, possibility of injury to tendons, ligaments, nerves or vessels and periprosthetic fractures, loss of extensor mechanism, stiffness, future above-the-knee amputation have been discussed in detail. Despite the risks involved, the patient elected to proceed and informed consent was obtained and the patient was scheduled for surgery. The patient was seen in the preoperative holding area and the operative site was marked.       DESCRIPTION OF PROCEDURE:   The patient was transferred to [facility] operating room.     Preoperative antibiotics in the form of  Vancomycin and  Kefzol  intravenously was infused prior to  the incision and prior to the tourniquet placement according to the SCIP protocol.     A surgical time out was done with the team and the correct patient, surgical side and site were identified.     After achieving adequate general anesthesia, a well-padded tourniquet was placed over the proximal aspect of the operative thigh. The operative leg was prepped and draped in the usual sterile fashion. Tourniquet was elevated to a pressure of 250 mm Hg.     Trannexamic acid was given intravenously prior to incision.      A skin incision was made vertically oriented centering over the patella anteriorly incorporating previous surgical scar. Skin and subcutaneous tissue were incised, full thickness flaps were raised and a medial parapatellar approach was developed. There was a moderate effusion with mild synovitis. The patella was subluxed and the knee was inspected.      The patient had a nonresurfaced patella.  The patella was tracking well.  There was loss of articular cartilage on the patella.    Again there was laxity both medially and laterally to varus valgus stress in extension.  She was also lax anteroposteriorly in flexion at 90 degrees.  The synovium was partially excised and sent for culture sensitivity.  The femur and tibia were inspected.  The polyethylene liner was removed this was a Bridger trabecular metal modular implant.  The polyethylene was about 10 mm thick.  The femur and tibia were well fixed to the underlying bone.  I utilized a small core sagittal saw followed by flexible osteotomes followed by a regular osteotome to loosen the implants from the underlying bone.  I was able to tamp out the femur implant without any bone loss.  I was able to remove the tibia without any bone loss.  The TM pegs however had to be taken out from the bone and this caused a significant amount of defect in the proximal tibia.     Attention was then directed to the proximal tibia.  Intramedullary reamer was utilized  progressively.  A   a conical proximal reamer was seated for adequate depth for a cone.  A trial cone was seated to achieve adequate metaphyseal stability.  A skim cut was done with an oscillating saw along the proximal end of the tibia.  The proximal tibial cut was found to be satisfactory. Trial baseplate was sized.  It was planned at the utilize 10 mm tibial augments to  Maintain joint line.    Attention was  directed to the distal femur.  Intramedullary reamer was utilized for the medullary canal .  4-in-1 cutting block was utilized followed by cutting the box cut for the TS component.  The cuts were completed maintaining correct rotational alignment based upon epicondylar axis.      Trial liner was seated and knee reduced.  Reduction was found to be satisfactory with range of motion from 0° to 120° with the patella tracking well.     The patella measured 23 mm in thickness.  9 mm of patella was resected and lug holes were drilled for a asymmetric size 29 patella.  Trial button was found to be seated satisfactorily.  It was found to be tracking well.    Having been satisfied with the trials, the bony surfaces irrigated with saline.  I have elected to proceed with press-fit cones. Exparel was infiltrated into the posterior capsule medially and laterally and into periarticular tissue and subcutaneously. The tibia and femur were seated appropriately. Followed by this, the tibial component was cemented into position followed by the femoral component, followed by seating of the 9 mm thick trial liner.  The implants were assembled on the back table.  I used Simplex cement  obtaining some support of the component to the bone secondary to bone loss.  I placed the intramedullary canal plug and fully cemented the tibia implant.  I used metaphyseal cementation technique for the femur.  Patella was cemented in.  The cement was allowed to set excess cement was removed.  The trial liner was replaced with a 9 mm posterior  stabilized liner.     Again, range of motion was checked and the range was satisfactory from 0° to 120° with excellent stability throughout the range of motion. Good medial and lateral tissue tension, and soft tissue balancing. The patella was tracking well. Having been satisfied with this, the joint was thoroughly irrigated with saline. Soft tissue hemostasis was secured. A 10-Uzbek Hemovac drain was placed.      The sponge and needle count was found to be correct.  Arthrotomy was closed with Ethibond sutures followed by closure of the incision in layers with Vicryl sutures and staples. Sterile dressings were placed and the patient was transferred to the recovery room in stable condition. The patient was given a knee immobilizer. The patient tolerated the procedure well and is being admitted for postoperative antibiotics according to the SCIP protocol for 2 more doses /  until I see culture result.     DVT Prophylaxis -  Mechanical - TEDS and venous foot plexipulses and aspirin will be started daily on postoperative day #1.     The patient will be mobilized in am with physical therapy. WBAT and no restrictions for ROM.    I discussed the satisfactory performance of the procedure with the patient's family and discussed with them The postoperative management.      Mary Be M.D.    4/6/2021    CC: Tiffanie Dickson PA; MD Indiana Cohen, Mary GIRON,*

## 2021-04-06 NOTE — ANESTHESIA PROCEDURE NOTES
Peripheral Block      Patient reassessed immediately prior to procedure    Start time: 4/6/2021 12:52 PM  Stop time: 4/6/2021 12:59 PM  Reason for block: at surgeon's request and post-op pain management  Performed by  Anesthesiologist: Josef Hernandez MD  Preanesthetic Checklist  Completed: patient identified, risks and benefits discussed, surgical consent, pre-op evaluation and timeout performed  Prep:  Pt Position: supine  Sterile barriers:cap, gloves and mask  Prep: ChloraPrep  Patient monitoring: blood pressure monitoring, continuous pulse oximetry and EKG  Procedure  Sedation:yes    Guidance:ultrasound guided  Images:still images obtained, printed/placed on chart    Laterality:left  Block Type:iPack  Injection Technique:single-shot  Needle Type:echogenic  Needle Gauge:21 G      Medications Used: ropivacaine (NAROPIN) 0.5 % injection, 30 mL  Med admintered at 4/6/2021 1:00 PM      Post Assessment  Injection Assessment: negative aspiration for heme, no paresthesia on injection and incremental injection  Patient Tolerance:comfortable throughout block  Complications:no  Additional Notes  ULTRASOUND INTERPRETATION. Using ultrasound guidance theneedle was placed in close proximity to the nerve, at which point, under ultrasound guidance, local anesthetic was injected around but not in the nerve and spread of the anesthesia was seen on ultrasound in close proximity thereto.  There were no abnormalities seen on ultrasound; a digital image was taken; and the patient tolerated the procedure with no complications.

## 2021-04-06 NOTE — ANESTHESIA PROCEDURE NOTES
Peripheral Block      Patient reassessed immediately prior to procedure    Start time: 4/6/2021 12:45 PM  Stop time: 4/6/2021 12:50 PM  Reason for block: at surgeon's request and post-op pain management  Performed by  Anesthesiologist: Josef Hernandez MD  Preanesthetic Checklist  Completed: patient identified, risks and benefits discussed, surgical consent, pre-op evaluation and timeout performed  Prep:  Pt Position: supine  Sterile barriers:cap, gloves and mask  Prep: ChloraPrep  Patient monitoring: blood pressure monitoring, continuous pulse oximetry and EKG  Procedure  Sedation:yes    Guidance:ultrasound guided  Images:still images obtained, printed/placed on chart    Laterality:left  Block Type:adductor canal block  Injection Technique:single-shot  Needle Type:echogenic  Needle Gauge:21 G      Medications Used: ropivacaine (NAROPIN) 0.5 % injection, 30 mL      Post Assessment  Injection Assessment: negative aspiration for heme, no paresthesia on injection and incremental injection  Patient Tolerance:comfortable throughout block  Complications:no  Additional Notes  ULTRASOUND INTERPRETATION. Using ultrasound guidance theneedle was placed in close proximity to the nerve, at which point, under ultrasound guidance, local anesthetic was injected around but not in the nerve and spread of the anesthesia was seen on ultrasound in close proximity thereto.  There were no abnormalities seen on ultrasound; a digital image was taken; and the patient tolerated the procedure with no complications.

## 2021-04-06 NOTE — ANESTHESIA POSTPROCEDURE EVALUATION
Patient: Grace Jacob    Procedure Summary     Date: 04/06/21 Room / Location: [facility] OR [address] [place] [address] MAIN OR    Anesthesia Start: 1308 Anesthesia Stop: 1554    Procedure: LEFT KNEE REVISION (Left Knee) Diagnosis:       Instability of internal left knee prosthesis, initial encounter (CMS/[facility])      (Instability of internal left knee prosthesis, initial encounter )    Surgeons: Mary Be MD Provider: Jsoef Hernandez MD    Anesthesia Type: general ASA Status: 2          Anesthesia Type: general    Vitals  Vitals Value Taken Time   /88 04/06/21 1745   Temp 37.2 °C (99 °F) 04/06/21 1730   Pulse 90 04/06/21 1745   Resp 16 04/06/21 1745   SpO2 93 % 04/06/21 1754   Vitals shown include unvalidated device data.        Post Anesthesia Care and Evaluation    Patient location during evaluation: bedside  Patient participation: complete - patient participated  Level of consciousness: awake  Pain management: adequate  Airway patency: patent  Anesthetic complications: No anesthetic complications  PONV Status: none  Cardiovascular status: acceptable  Respiratory status: acceptable  Hydration status: acceptable  Post Neuraxial Block status: Motor and sensory function returned to baseline

## 2021-04-06 NOTE — PLAN OF CARE
Goal Outcome Evaluation:     Progress: improving  Outcome Summary: S/P of left TKA revision. VSS. Dressing C/D/I and hemovac in place. Voiding per BSC. Plan is to D/C home tomorrow when stable. Will continue to monitor.

## 2021-04-07 LAB
ANION GAP SERPL CALCULATED.3IONS-SCNC: 10.3 MMOL/L (ref 5–15)
BASOPHILS # BLD AUTO: 0.06 10*3/MM3 (ref 0–0.2)
BASOPHILS NFR BLD AUTO: 0.3 % (ref 0–1.5)
BUN SERPL-MCNC: 13 MG/DL (ref 6–20)
BUN/CREAT SERPL: 15.9 (ref 7–25)
CALCIUM SPEC-SCNC: 8.2 MG/DL (ref 8.6–10.5)
CHLORIDE SERPL-SCNC: 99 MMOL/L (ref 98–107)
CO2 SERPL-SCNC: 26.7 MMOL/L (ref 22–29)
CREAT SERPL-MCNC: 0.82 MG/DL (ref 0.57–1)
DEPRECATED RDW RBC AUTO: 46.9 FL (ref 37–54)
EOSINOPHIL # BLD AUTO: 0 10*3/MM3 (ref 0–0.4)
EOSINOPHIL NFR BLD AUTO: 0 % (ref 0.3–6.2)
ERYTHROCYTE [DISTWIDTH] IN BLOOD BY AUTOMATED COUNT: 14.4 % (ref 12.3–15.4)
GFR SERPL CREATININE-BSD FRML MDRD: 72 ML/MIN/1.73
GLUCOSE SERPL-MCNC: 144 MG/DL (ref 65–99)
HCT VFR BLD AUTO: 36.5 % (ref 34–46.6)
HGB BLD-MCNC: 12.1 G/DL (ref 12–15.9)
IMM GRANULOCYTES # BLD AUTO: 0.13 10*3/MM3 (ref 0–0.05)
IMM GRANULOCYTES NFR BLD AUTO: 0.6 % (ref 0–0.5)
LYMPHOCYTES # BLD AUTO: 1.03 10*3/MM3 (ref 0.7–3.1)
LYMPHOCYTES NFR BLD AUTO: 4.6 % (ref 19.6–45.3)
MCH RBC QN AUTO: 29.6 PG (ref 26.6–33)
MCHC RBC AUTO-ENTMCNC: 33.2 G/DL (ref 31.5–35.7)
MCV RBC AUTO: 89.2 FL (ref 79–97)
MONOCYTES # BLD AUTO: 0.85 10*3/MM3 (ref 0.1–0.9)
MONOCYTES NFR BLD AUTO: 3.8 % (ref 5–12)
NEUTROPHILS NFR BLD AUTO: 20.19 10*3/MM3 (ref 1.7–7)
NEUTROPHILS NFR BLD AUTO: 90.7 % (ref 42.7–76)
NRBC BLD AUTO-RTO: 0 /100 WBC (ref 0–0.2)
PLATELET # BLD AUTO: 319 10*3/MM3 (ref 140–450)
PMV BLD AUTO: 9 FL (ref 6–12)
POTASSIUM SERPL-SCNC: 4.6 MMOL/L (ref 3.5–5.2)
RBC # BLD AUTO: 4.09 10*6/MM3 (ref 3.77–5.28)
SODIUM SERPL-SCNC: 136 MMOL/L (ref 136–145)
WBC # BLD AUTO: 22.26 10*3/MM3 (ref 3.4–10.8)

## 2021-04-07 PROCEDURE — 97535 SELF CARE MNGMENT TRAINING: CPT | Performed by: OCCUPATIONAL THERAPIST

## 2021-04-07 PROCEDURE — 80048 BASIC METABOLIC PNL TOTAL CA: CPT | Performed by: ORTHOPAEDIC SURGERY

## 2021-04-07 PROCEDURE — 25010000003 CEFAZOLIN IN DEXTROSE 2-4 GM/100ML-% SOLUTION: Performed by: ORTHOPAEDIC SURGERY

## 2021-04-07 PROCEDURE — 25010000002 HYDROMORPHONE PER 4 MG: Performed by: ORTHOPAEDIC SURGERY

## 2021-04-07 PROCEDURE — 97161 PT EVAL LOW COMPLEX 20 MIN: CPT

## 2021-04-07 PROCEDURE — 97110 THERAPEUTIC EXERCISES: CPT

## 2021-04-07 PROCEDURE — 85025 COMPLETE CBC W/AUTO DIFF WBC: CPT | Performed by: ORTHOPAEDIC SURGERY

## 2021-04-07 PROCEDURE — 97530 THERAPEUTIC ACTIVITIES: CPT

## 2021-04-07 PROCEDURE — 97165 OT EVAL LOW COMPLEX 30 MIN: CPT | Performed by: OCCUPATIONAL THERAPIST

## 2021-04-07 RX ORDER — BISACODYL 5 MG/1
10 TABLET, DELAYED RELEASE ORAL DAILY PRN
Qty: 10 TABLET | Refills: 0 | Status: SHIPPED | OUTPATIENT
Start: 2021-04-07 | End: 2021-04-16

## 2021-04-07 RX ORDER — PSEUDOEPHEDRINE HCL 30 MG
100 TABLET ORAL 2 TIMES DAILY
Qty: 29 CAPSULE | Refills: 0 | Status: SHIPPED | OUTPATIENT
Start: 2021-04-08 | End: 2021-04-23

## 2021-04-07 RX ORDER — HYDROCODONE BITARTRATE AND ACETAMINOPHEN 7.5; 325 MG/1; MG/1
1 TABLET ORAL EVERY 4 HOURS PRN
Qty: 42 TABLET | Refills: 0 | Status: SHIPPED | OUTPATIENT
Start: 2021-04-07 | End: 2021-04-16

## 2021-04-07 RX ORDER — ASPIRIN 81 MG/1
81 TABLET ORAL EVERY 12 HOURS SCHEDULED
Qty: 59 TABLET | Refills: 0 | Status: SHIPPED | OUTPATIENT
Start: 2021-04-08 | End: 2021-05-08

## 2021-04-07 RX ORDER — ONDANSETRON 4 MG/1
4 TABLET, FILM COATED ORAL EVERY 6 HOURS PRN
Qty: 30 TABLET | Refills: 0 | Status: SHIPPED | OUTPATIENT
Start: 2021-04-07 | End: 2022-03-11

## 2021-04-07 RX ADMIN — ASPIRIN 81 MG: 81 TABLET, COATED ORAL at 21:00

## 2021-04-07 RX ADMIN — CELECOXIB 200 MG: 200 CAPSULE ORAL at 08:29

## 2021-04-07 RX ADMIN — HYDROMORPHONE HYDROCHLORIDE 0.5 MG: 1 INJECTION, SOLUTION INTRAMUSCULAR; INTRAVENOUS; SUBCUTANEOUS at 23:11

## 2021-04-07 RX ADMIN — CELECOXIB 200 MG: 200 CAPSULE ORAL at 21:00

## 2021-04-07 RX ADMIN — ASPIRIN 81 MG: 81 TABLET, COATED ORAL at 08:28

## 2021-04-07 RX ADMIN — DOCUSATE SODIUM 100 MG: 100 CAPSULE, LIQUID FILLED ORAL at 08:29

## 2021-04-07 RX ADMIN — HYDROCODONE BITARTRATE AND ACETAMINOPHEN 2 TABLET: 7.5; 325 TABLET ORAL at 09:21

## 2021-04-07 RX ADMIN — HYDROCODONE BITARTRATE AND ACETAMINOPHEN 2 TABLET: 7.5; 325 TABLET ORAL at 21:00

## 2021-04-07 RX ADMIN — HYDROCODONE BITARTRATE AND ACETAMINOPHEN 2 TABLET: 7.5; 325 TABLET ORAL at 05:19

## 2021-04-07 RX ADMIN — HYDROCODONE BITARTRATE AND ACETAMINOPHEN 2 TABLET: 7.5; 325 TABLET ORAL at 00:24

## 2021-04-07 RX ADMIN — Medication 1 MG: at 23:11

## 2021-04-07 RX ADMIN — DULOXETINE HYDROCHLORIDE 60 MG: 60 CAPSULE, DELAYED RELEASE ORAL at 21:00

## 2021-04-07 RX ADMIN — CEFAZOLIN SODIUM 2 G: 2 INJECTION, SOLUTION INTRAVENOUS at 05:19

## 2021-04-07 RX ADMIN — DOCUSATE SODIUM 100 MG: 100 CAPSULE, LIQUID FILLED ORAL at 21:00

## 2021-04-07 RX ADMIN — HYDROCODONE BITARTRATE AND ACETAMINOPHEN 2 TABLET: 7.5; 325 TABLET ORAL at 16:10

## 2021-04-07 NOTE — PLAN OF CARE
Goal Outcome Evaluation:  Plan of Care Reviewed With: patient  Progress: improving  Outcome Summary: POD#1 OF LEFT TOTAL KNEE. VSS. PO PAIN MEDICATION HELPING WITH PAIN. AMBULATING WITH 1 ASSIST. VOIDING PER BRP/BSC. EDUCATION PROVIDED ON PONV. D/C HOME TODAY. WILL CONTINUE TO MONITOR.

## 2021-04-07 NOTE — PLAN OF CARE
Goal Outcome Evaluation:  Plan of Care Reviewed With: patient  Progress: improving  Outcome Summary: pt completed OT eval, she was SBA to walk to the BR w rolling walker, tsf to shower seat to wash up at the sink, she was mod I w UBD and UBB, she was min A w. LBD and LBB. She completed grooming skills at the sink mod I. Pt did well overall and states her  can A her at home w. ADLs, ed pt on tub bench for shower tub to A her in tsf into the shower. Ed pt on safety at home w. ADLs and tsf and to have her  A her or be there for safety at first. pt plan to dc home w.  and HH PT. no further OT needs since she has family to A until she gains more ROM in her knee.  OT wore all PPE, washed hands before/after. Pt not wearing a mask.

## 2021-04-07 NOTE — DISCHARGE PLACEMENT REQUEST
"Grace Leija (56 y.o. Female)     Date of Birth Social Security Number Address Home Phone MRN    1965  [address]  [place] [id number] [phone] [id number]    Worship Marital Status          [employer]        Admission Date Admission Type Admitting Provider Attending Provider Department, Room/Bed    4/6/21 Elective Mary Be MD Yakkanti, Madhusudhan R, MD [address], P782/1    Discharge Date Discharge Disposition Discharge Destination                       Attending Provider: Mary Be MD    Allergies: Ciprofloxacin, Penicillins    Isolation: None   Infection: COVID (History) (04/06/21)   Code Status: CPR    Ht: 170.2 cm (67\")   Wt: 114 kg (251 lb 1.7 oz)    Admission Cmt: None   Principal Problem: Instability of internal left knee prosthesis (CMS/[facility]) [T84.023A]                 Active Insurance as of 4/6/2021     Primary Coverage     Payor Plan Insurance Group Employer/Plan Group    [facility] [employer] EMPL[employer]EE [id number]     Payor Plan Address Payor Plan Phone Number Payor Plan Fax Number Effective Dates    [address] [phone]  1/1/2015 - None Entered    [facility] [address]       Subscriber Name Subscriber Birth Date Member ID       GRACE LEIJA 1965 [id number]                 Emergency Contacts      (Rel.) Home Phone Work Phone Mobile Phone    Ant Leija (Spouse) [phone] -- [phone]          "

## 2021-04-07 NOTE — CONSULTS
[facility] Medicine Services  CONSULT NOTE      Patient Name: Grace Jacob  : 1965  MRN: [id number]    Primary Care Physician: Tiffanie Dickson PA  Provider requesting consultation: Mary Be,*    Subjective   Subjective     Reason for Consultation:  Medical management    HPI:  Grace Jacob is a 56 y.o. female with past medical history of 3-year left knee pain with associated swelling.  Patient describes knee pain is severe intermittent and sharp.  She presented to the hospital for left knee replacement with orthopedic surgery.  Orthopedic surgery has consulted us to assist with medical management.    Review of Systems   Constitutional: Negative for chills and fever.   HENT: Negative.    Eyes: Negative.    Respiratory: Negative.    Cardiovascular: Negative.    Gastrointestinal: Negative.    Endocrine: Negative.    Genitourinary: Negative.    Musculoskeletal: Negative for neck pain and neck stiffness.   Skin: Negative for pallor and rash.   Allergic/Immunologic: Negative.    Neurological: Negative for light-headedness and headaches.   Hematological: Negative.    Psychiatric/Behavioral: Negative.          Personal History     Past Medical History:   Diagnosis Date   • Anxiety    • Arthritis    • Chronic pain     BILATERAL KNEE   • Frequent UTI    • Herpes    • IBS (irritable bowel syndrome)    • Left knee pain    • PONV (postoperative nausea and vomiting)    • Urinary incontinence        Past Surgical History:   Procedure Laterality Date   • HIP ARTHROPLASTY Left    • LAPAROSCOPIC CHOLECYSTECTOMY     • LIPOMA EXCISION     • TOTAL HIP ARTHROPLASTY Right 10/19/2018    Procedure: RIGHT TOTAL  ANTERIOR HIP ARTHROPLASTY;  Surgeon: Mary Be MD;  Location: [facility];  Service: Orthopedics   • TOTAL KNEE ARTHROPLASTY      bilateral       Family History:pertinent FHx was reviewed and unremarkable.     Social History:  reports that she has never smoked. She has never used  smokeless tobacco. She reports that she does not drink alcohol and does not use drugs.    Medications:  DULoxetine, Glucosamine HCl, celecoxib, traMADol, and valACYclovir    Scheduled Meds:aspirin, 81 mg, Oral, Q12H  celecoxib, 200 mg, Oral, BID  docusate sodium, 100 mg, Oral, BID  DULoxetine, 60 mg, Oral, Nightly  Scopolamine, 1 patch, Transdermal, Once      Continuous Infusions:lactated ringers, 9 mL/hr, Last Rate: 9 mL/hr (04/06/21 1226)  lactated ringers, 9 mL/hr      PRN Meds:.•  acetaminophen  •  bisacodyl  •  HYDROcodone-acetaminophen  •  HYDROcodone-acetaminophen  •  HYDROmorphone **AND** naloxone  •  melatonin  •  ondansetron **OR** ondansetron    Allergies   Allergen Reactions   • Ciprofloxacin Rash   • Penicillins Rash       Objective   Objective     Vital Signs:   Temp:  [96.9 °F (36.1 °C)-99 °F (37.2 °C)] 98.1 °F (36.7 °C)  Heart Rate:  [] 90  Resp:  [12-18] 16  BP: ()/(62-97) 92/62  Flow (L/min):  [2-4] 2    Physical Exam  Constitutional: Awake, alert  Eyes: Pupils equal, sclerae anicteric, no conjunctival injection  HENT: NCAT, mucous membranes moist  Neck: Supple, no thyromegaly, no lymphadenopathy, trachea midline  Respiratory: Clear to auscultation bilaterally, nonlabored respirations   Cardiovascular: RRR, no murmurs, rubs, or gallops, palpable pedal pulses bilaterally  Gastrointestinal: Positive bowel sounds, soft, nontender, nondistended  Musculoskeletal: Near morbid obesity, BMI is 39.3, mild lower extremity edema  Psychiatric: Appropriate affect, cooperative  Neurologic: Oriented x 3, strength symmetric in all extremities, Cranial Nerves grossly intact to confrontation, speech clear  Skin: Postsurgical incision stable, no rashes          LAB RESULTS:      Lab 04/07/21  0527   WBC 22.26*   HEMOGLOBIN 12.1   HEMATOCRIT 36.5   PLATELETS 319   NEUTROS ABS 20.19*   IMMATURE GRANS (ABS) 0.13*   LYMPHS ABS 1.03   MONOS ABS 0.85   EOS ABS 0.00   MCV 89.2         Lab 04/07/21  0527   SODIUM  136   POTASSIUM 4.6   CHLORIDE 99   CO2 26.7   ANION GAP 10.3   BUN 13   CREATININE 0.82   GLUCOSE 144*   CALCIUM 8.2*                           Microbiology Results (last 10 days)     Procedure Component Value - Date/Time    Tissue / Bone Culture - Tissue, Knee, Left [[id number]] Collected: 04/06/21 1351    Lab Status: Preliminary result Specimen: Tissue from Knee, Left Updated: 04/07/21 0640     Tissue Culture No growth     Gram Stain No WBCs or organisms seen    COVID PRE-OP / PRE-PROCEDURE SCREENING ORDER (NO ISOLATION) - Swab, Nasopharynx [[id number]] Collected: 04/03/21 1510    Lab Status: Final result Specimen: Swab from Nasopharynx Updated: 04/05/21 1200    Narrative:      The following orders were created for panel order COVID PRE-OP / PRE-PROCEDURE SCREENING ORDER (NO ISOLATION) - Swab, Nasopharynx.  Procedure                               Abnormality         Status                     ---------                               -----------         ------                     COVID-19,BIOTAP, NP/OP S...[[id number]]                      Final result                 Please view results for these tests on the individual orders.    COVID-19,BIOTAP, NP/OP SWAB IN TRANSPORT MEDIA OR SALINE 24-36 HR TAT - Swab, Nasopharynx [[id number]] Collected: 04/03/21 1510    Lab Status: Final result Specimen: Swab from Nasopharynx Updated: 04/05/21 1200     SARS-CoV-2 PCR Not Detected     Comment: Nucleic acid specific to SARS-CoV-2 (COVID-19) virus was not detected in  this sample by the TaqPath (TM) COVID-19 Combo Kit.          SARS-CoV-2 (COVID-19) nucleic acid testing performed using exozet Aptima (R) SARS-CoV-2 Assay or Slice TaqPath (TM)  COVID-19 Combo Kit as indicated above under Emergency Use Authorization (EUA) from the FDA. Aptima (R) and TaqPath (TM) test performance  characteristics verified by [facility] in accordance with the FDAs Guidance Document (Policy for Diagnostic Tests for Coronavirus  Disease-2019  during the Public Health Emergency) issued on March 16, 2020. The laboratory is regulated under CLIA as qualified to perform high-complexity testing  Unless otherwise noted, all testing was performed at [facility], CLIA No. [id number], [place] State Licensee No. [id number]             XR Knee 1 or 2 View Left    Result Date: 4/6/2021  XR KNEE 1 OR 2 VW LEFT-  INDICATIONS: Postoperative evaluation.  TECHNIQUE: Frontal and lateral views of the left knee  COMPARISON: None available  FINDINGS:   Intact appearing knee arthroplasty hardware is seen with adjacent surgical soft tissue gas, drain, overlying skin staples. No acute fracture is identified.       Impression:  Postsurgical changes.    This report was finalized on 4/6/2021 5:05 PM by Dr. Syd Ron M.D.      Peripheral Block    Result Date: 4/6/2021  Josef Hernandez MD     4/6/2021  1:00 PM Peripheral Block Patient reassessed immediately prior to procedure Start time: 4/6/2021 12:52 PM Stop time: 4/6/2021 12:59 PM Reason for block: at surgeon's request and post-op pain management Performed by Anesthesiologist: Josef Hernandez MD Preanesthetic Checklist Completed: patient identified, risks and benefits discussed, surgical consent, pre-op evaluation and timeout performed Prep: Pt Position: supine Sterile barriers:cap, gloves and mask Prep: ChloraPrep Patient monitoring: blood pressure monitoring, continuous pulse oximetry and EKG Procedure Sedation:yes Guidance:ultrasound guided Images:still images obtained, printed/placed on chart Laterality:left Block Type:iPack Injection Technique:single-shot Needle Type:echogenic Needle Gauge:21 G Medications Used: ropivacaine (NAROPIN) 0.5 % injection, 30 mL Med admintered at 4/6/2021 1:00 PM Post Assessment Injection Assessment: negative aspiration for heme, no paresthesia on injection and incremental injection Patient Tolerance:comfortable throughout block Complications:no Additional Notes  ULTRASOUND INTERPRETATION. Using ultrasound guidance theneedle was placed in close proximity to the nerve, at which point, under ultrasound guidance, local anesthetic was injected around but not in the nerve and spread of the anesthesia was seen on ultrasound in close proximity thereto.  There were no abnormalities seen on ultrasound; a digital image was taken; and the patient tolerated the procedure with no complications.     Peripheral Block    Result Date: 4/6/2021  Josef Hernandez MD     4/6/2021 12:59 PM Peripheral Block Patient reassessed immediately prior to procedure Start time: 4/6/2021 12:45 PM Stop time: 4/6/2021 12:50 PM Reason for block: at surgeon's request and post-op pain management Performed by Anesthesiologist: Josef Hernandez MD Preanesthetic Checklist Completed: patient identified, risks and benefits discussed, surgical consent, pre-op evaluation and timeout performed Prep: Pt Position: supine Sterile barriers:cap, gloves and mask Prep: ChloraPrep Patient monitoring: blood pressure monitoring, continuous pulse oximetry and EKG Procedure Sedation:yes Guidance:ultrasound guided Images:still images obtained, printed/placed on chart Laterality:left Block Type:adductor canal block Injection Technique:single-shot Needle Type:echogenic Needle Gauge:21 G Medications Used: ropivacaine (NAROPIN) 0.5 % injection, 30 mL Post Assessment Injection Assessment: negative aspiration for heme, no paresthesia on injection and incremental injection Patient Tolerance:comfortable throughout block Complications:no Additional Notes ULTRASOUND INTERPRETATION. Using ultrasound guidance theneedle was placed in close proximity to the nerve, at which point, under ultrasound guidance, local anesthetic was injected around but not in the nerve and spread of the anesthesia was seen on ultrasound in close proximity thereto.  There were no abnormalities seen on ultrasound; a digital image was taken; and the patient  tolerated the procedure with no complications.           Assessment/Plan   Assessment & Plan     Active Hospital Problems    Diagnosis  POA   • Chronic pain [G89.29]  Yes   • Status post revision of total knee replacement, left [Z96.652]  Not Applicable   • Obesity (BMI 30-39.9) [E66.9]  Yes   • Anxiety [F41.9]  Yes      Resolved Hospital Problems    Diagnosis Date Resolved POA   • **Instability of internal left knee prosthesis (CMS/[facility]) [T84.023A] 04/06/2021 Not Applicable     Postoperative leukocytosis: Patient without infectious symptoms.  Likely reactive due to surgery which is commonly seen after joint replacement.  Continue to monitor and could consider further work-up if fever or respiratory symptoms occur.    Status post total knee replacement: Postoperative management per surgery.  Otherwise supportive care and symptomatic treatment.  Continue encouragement.    Obesity: Weight loss recommended.  Complicates arthritis issues and overall health.  Recommend improved diet and exercise after her replacement.    Chronic pain: Continue home regimen for now.  Chronically tolerates Celebrex.  Continue Cymbalta.  Other postoperative pain medicine as needed, monitor for any signs of sedation.    Anxiety: Continue Cymbalta.  Stable.  Provide encouragement.    Thank you for allowing [facility] Medicine Service to provide consultative care for your patient, we will continue to follow while clinically appropriate.    Dwight Dudley MD  04/07/21

## 2021-04-07 NOTE — PROGRESS NOTES
Continued Stay Note  [facility]     Patient Name: Grace Jacob  MRN: [id number]  Today's Date: 4/7/2021    Admit Date: 4/6/2021    Discharge Plan     Row Name 04/07/21 1347       Plan    Plan  [facility]    Patient/Family in Agreement with Plan  yes    Plan Comments  Spoke with pt, verified correct information on facesheet and explained the role of CCP. Pt would like to d/c home with [facility], referral sent in Epic to [facility]. Plan will be to d/c home with [facility] and family support. No other needs identified.        Discharge Codes    No documentation.             Mercy Wilson RN

## 2021-04-07 NOTE — THERAPY EVALUATION
Patient Name: Grace Jacob  : 1965    MRN: [id number]                              Today's Date: 2021       Admit Date: 2021    Visit Dx:     ICD-10-CM ICD-9-CM   1. Status post revision of total knee replacement, left  Z96.652 V43.65   2. Instability of left knee joint  M25.362 718.86     Patient Active Problem List   Diagnosis   • Obesity (BMI 30-39.9)   • Anxiety   • Status post revision of total knee replacement, left   • Chronic pain     Past Medical History:   Diagnosis Date   • Anxiety    • Arthritis    • Chronic pain     BILATERAL KNEE   • Frequent UTI    • Herpes    • IBS (irritable bowel syndrome)    • Left knee pain    • PONV (postoperative nausea and vomiting)    • Urinary incontinence      Past Surgical History:   Procedure Laterality Date   • HIP ARTHROPLASTY Left    • LAPAROSCOPIC CHOLECYSTECTOMY     • LIPOMA EXCISION     • TOTAL HIP ARTHROPLASTY Right 10/19/2018    Procedure: RIGHT TOTAL  ANTERIOR HIP ARTHROPLASTY;  Surgeon: Mary Be MD;  Location: [facility];  Service: Orthopedics   • TOTAL KNEE ARTHROPLASTY      bilateral   • TOTAL KNEE ARTHROPLASTY REVISION Left 2021    Procedure: LEFT KNEE REVISION;  Surgeon: Mary Be MD;  Location: [facility];  Service: Orthopedics;  Laterality: Left;     General Information     Row Name 21 1112          Physical Therapy Time and Intention    Document Type  evaluation  -CF     Mode of Treatment  individual therapy;physical therapy  -CF     Row Name 21 1112          General Information    Patient Profile Reviewed  yes  -CF     Prior Level of Function  independent:  -CF     Existing Precautions/Restrictions  fall  -CF     Barriers to Rehab  none identified  -CF     Row Name 21 1112          Living Environment    Lives With  spouse  -CF     Row Name 21 1112          Home Main Entrance    Number of Stairs, Main Entrance  five;four  -CF     Stair Railings, Main Entrance  railing  on left side (ascending)  -     Row Name 04/07/21 1112          Stairs Within Home, Primary    Number of Stairs, Within Home, Primary  none  -     Row Name 04/07/21 1112          Cognition    Orientation Status (Cognition)  oriented x 4  -     Row Name 04/07/21 1112          Safety Issues, Functional Mobility    Impairments Affecting Function (Mobility)  balance;endurance/activity tolerance;strength;range of motion (ROM);pain  -CF       User Key  (r) = Recorded By, (t) = Taken By, (c) = Cosigned By    Initials Name Provider Type    CF Jeniffer Valencia, PT Physical Therapist        Mobility     Row Name 04/07/21 1131          Bed Mobility    Bed Mobility  supine-sit  -CF     Supine-Sit [place] (Bed Mobility)  minimum assist (75% patient effort)  -     Assistive Device (Bed Mobility)  bed rails;head of bed elevated  -     Comment (Bed Mobility)  Assist to support LLE  -     Row Name 04/07/21 1131          Transfers    Comment (Transfers)  Pt able to do SLR so knee immobiiizer not needed., Pt states she also was up to the bathroom with nursing staff  -     Row Name 04/07/21 1131          Sit-Stand Transfer    Sit-Stand [place] (Transfers)  contact guard;1 person assist  -CF     Assistive Device (Sit-Stand Transfers)  walker, front-wheeled  -     Row Name 04/07/21 1131          Gait/Stairs (Locomotion)    [place] Level (Gait)  contact guard;1 person assist  -CF     Assistive Device (Gait)  walker, front-wheeled  -CF     Distance in Feet (Gait)  30'  -CF     Deviations/Abnormal Patterns (Gait)  genaro decreased;gait speed decreased;antalgic  -CF     Bilateral Gait Deviations  forward flexed posture  -CF     Left Sided Gait Deviations  weight shift ability decreased;heel strike decreased  -CF     Comment (Gait/Stairs)  Very slow pace, pain limiting. Cues for L heel strike and upright posture  -     Row Name 04/07/21 1131          Mobility    Extremity Weight-bearing Status  left lower  extremity  -CF     Left Lower Extremity (Weight-bearing Status)  weight-bearing as tolerated (WBAT)  -       User Key  (r) = Recorded By, (t) = Taken By, (c) = Cosigned By    Initials Name Provider Type    CF Jeniffer Valencia PT Physical Therapist        Obj/Interventions     [facility] Name 04/07/21 1132          Range of Motion Comprehensive    Comment, General Range of Motion  L knee 5-60 approx  -CF     Row Name 04/07/21 1132          Strength Comprehensive (MMT)    Comment, General Manual Muscle Testing (MMT) Assessment  BLE WFL, not formally assessed  -[facility] Name 04/07/21 1132          Motor Skills    Therapeutic Exercise  other (see comments) tka exercises x10  -[facility] Name 04/07/21 1132          Sensory Assessment (Somatosensory)    Sensory Assessment (Somatosensory)  LE sensation intact  -       User Key  (r) = Recorded By, (t) = Taken By, (c) = Cosigned By    Initials Name Provider Type     Jeniffer Valencia PT Physical Therapist        Goals/Plan     Row Name 04/07/21 1133          Bed Mobility Goal 1 (PT)    Activity/Assistive Device (Bed Mobility Goal 1, PT)  bed mobility activities, all  -CF     [place] Level/Cues Needed (Bed Mobility Goal 1, PT)  modified independence  -CF     Time Frame (Bed Mobility Goal 1, PT)  5 days  -CF     Row Name 04/07/21 1133          Transfer Goal 1 (PT)    Activity/Assistive Device (Transfer Goal 1, PT)  sit-to-stand/stand-to-sit;bed-to-chair/chair-to-bed;walker, rolling  -CF     [place] Level/Cues Needed (Transfer Goal 1, PT)  contact guard assist  -CF     Time Frame (Transfer Goal 1, PT)  5 days  -CF     Row Name 04/07/21 1133          Gait Training Goal 1 (PT)    Activity/Assistive Device (Gait Training Goal 1, PT)  gait (walking locomotion);walker, rolling  -CF     [place] Level (Gait Training Goal 1, PT)  contact guard assist  -CF     Distance (Gait Training Goal 1, PT)  100'  -CF     Time Frame (Gait Training Goal 1, PT)  5 days  [facility]  Name 04/07/21 1133          ROM Goal 1 (PT)    ROM Goal 1 (PT)  L knee 0-90  -CF     Time Frame (ROM Goal 1, PT)  5 days  -CF     Row Name 04/07/21 1133          Stairs Goal 1 (PT)    Activity/Assistive Device (Stairs Goal 1, PT)  ascending stairs;descending stairs  -CF     [place] Level/Cues Needed (Stairs Goal 1, PT)  contact guard assist  -CF     Number of Stairs (Stairs Goal 1, PT)  4  -CF     Time Frame (Stairs Goal 1, PT)  5 days  -CF       User Key  (r) = Recorded By, (t) = Taken By, (c) = Cosigned By    Initials Name Provider Type    CF Jeniffer Valencia, PT Physical Therapist        Clinical Impression     Row Name 04/07/21 1150          Pain    Additional Documentation  Pain Scale: Numbers Pre/Post-Treatment (Group)  -CF     Row Name 04/07/21 1151          Pain Scale: Numbers Pre/Post-Treatment    Pretreatment Pain Rating  7/10  -CF     Posttreatment Pain Rating  7/10  -CF     Pain Location - Side  Left  -CF     Pain Location  knee  -CF     Pain Intervention(s)  Medication (See MAR);Ambulation/increased activity;Rest;Repositioned;Cold pack  -CF     Row Name 04/07/21 2983          Plan of Care Review    Plan of Care Reviewed With  patient  -CF     Outcome Summary  Pt is POD 1 L TKA. Pt reports living with , 4 steps to enter and was IND prior to surgery. Today, pt required min A for bed mobility, cga for sit<>stand, and cga to ambulate 30' with RW. Pt was able to perform SLR so no knee immobilizer needed for mobility. Pt demonstrates deficits in ROM, strength and general functional mobility deficits that may benefit from skilled PT. Anticipate d/c home tomorrow with assist and HH.  -CF     Row Name 04/07/21 5335          Therapy Assessment/Plan (PT)    Rehab Potential (PT)  good, to achieve stated therapy goals  -CF     Criteria for Skilled Interventions Met (PT)  yes  -CF     Predicted Duration of Therapy Intervention (PT)  5 days  -CF     Row Name 04/07/21 115          Vital Signs    O2  Delivery Pre Treatment  supplemental O2 1L when sleeping  -CF     O2 Delivery Intra Treatment  room air  -CF     O2 Delivery Post Treatment  room air  -CF     Row Name 04/07/21 1157          Positioning and Restraints    Pre-Treatment Position  in bed  -CF     Post Treatment Position  chair  -CF     In Chair  reclined;call light within reach;encouraged to call for assist;exit alarm on;LLE elevated  -CF       User Key  (r) = Recorded By, (t) = Taken By, (c) = Cosigned By    Initials Name Provider Type    Jeniffer Ingram, SANGEETHA Physical Therapist        Outcome Measures     Row Name 04/07/21 1134          How much help from another person do you currently need...    Turning from your back to your side while in flat bed without using bedrails?  3  -CF     Moving from lying on back to sitting on the side of a flat bed without bedrails?  3  -CF     Moving to and from a bed to a chair (including a wheelchair)?  3  -CF     Standing up from a chair using your arms (e.g., wheelchair, bedside chair)?  3  -CF     Climbing 3-5 steps with a railing?  2  -CF     To walk in hospital room?  3  -CF     AM-PAC 6 Clicks Score (PT)  17  -CF     Row Name 04/07/21 1134          Functional Assessment    Outcome Measure Options  AM-PAC 6 Clicks Basic Mobility (PT)  -CF       User Key  (r) = Recorded By, (t) = Taken By, (c) = Cosigned By    Initials Name Provider Type    Jeniffer Ingram, SANGEETHA Physical Therapist        Physical Therapy Education                 Title: PT OT SLP Therapies (Done)     Topic: Physical Therapy (Done)     Point: Mobility training (Done)     Learning Progress Summary           Patient Acceptance, E, VU,NR by CF at 4/7/2021 1137                   Point: Home exercise program (Done)     Learning Progress Summary           Patient Acceptance, E, VU,NR by CF at 4/7/2021 1137                   Point: Body mechanics (Done)     Learning Progress Summary           Patient Acceptance, E, VU,NR by CF at 4/7/2021 1137                    Point: Precautions (Done)     Learning Progress Summary           Patient Acceptance, E, VU,NR by  at 4/7/2021 1137                               User Key     Initials Effective Dates Name Provider Type Discipline     09/02/20 -  Jeniffer Valencia PT Physical Therapist PT              PT Recommendation and Plan  Planned Therapy Interventions (PT): balance training, bed mobility training, gait training, home exercise program, ROM (range of motion), stair training, patient/family education, strengthening, transfer training, stretching  Plan of Care Reviewed With: patient  Outcome Summary: Pt is POD 1 L TKA. Pt reports living with , 4 steps to enter and was IND prior to surgery. Today, pt required min A for bed mobility, cga for sit<>stand, and cga to ambulate 30' with RW. Pt was able to perform SLR so no knee immobilizer needed for mobility. Pt demonstrates deficits in ROM, strength and general functional mobility deficits that may benefit from skilled PT. Anticipate d/c home tomorrow with assist and HH.     Time Calculation:   PT Charges     Row Name 04/07/21 1112             Time Calculation    Start Time  1110  -CF      Stop Time  1153  -      Time Calculation (min)  43 min  -      PT Received On  04/07/21  -      PT - Next Appointment  04/08/21  -      PT Goal Re-Cert Due Date  04/12/21  -         Time Calculation- PT    Total Timed Code Minutes- PT  38 minute(s)  -        User Key  (r) = Recorded By, (t) = Taken By, (c) = Cosigned By    Initials Name Provider Type     Jeniffer Valencia PT Physical Therapist        Therapy Charges for Today     Code Description Service Date Service Provider Modifiers Qty    50389414925 HC PT EVAL LOW COMPLEXITY 2 4/7/2021 Jeniffer Valencia, PT GP 1    10629275607 HC PT THER PROC EA 15 MIN 4/7/2021 Jeniffer Valencia, PT GP 2    73608847074 HC PT THERAPEUTIC ACT EA 15 MIN 4/7/2021 Jeniffer Valencia, PT GP 1          PT G-Codes  Outcome  Measure Options: AM-PAC 6 Clicks Basic Mobility (PT)  -[facility] 6 Clicks Score (PT): 17    Jeniffer Valencia, PT  4/7/2021

## 2021-04-07 NOTE — THERAPY DISCHARGE NOTE
Acute Care - Occupational Therapy Discharge  [facility]    Patient Name: Grace Jacob  : 1965    MRN: [id number]                              Today's Date: 2021       Admit Date: 2021    Visit Dx:     ICD-10-CM ICD-9-CM   1. Status post revision of total knee replacement, left  Z96.652 V43.65   2. Instability of left knee joint  M25.362 718.86     Patient Active Problem List   Diagnosis   • Obesity (BMI 30-39.9)   • Anxiety   • Status post revision of total knee replacement, left   • Chronic pain     Past Medical History:   Diagnosis Date   • Anxiety    • Arthritis    • Chronic pain     BILATERAL KNEE   • Frequent UTI    • Herpes    • IBS (irritable bowel syndrome)    • Left knee pain    • PONV (postoperative nausea and vomiting)    • Urinary incontinence      Past Surgical History:   Procedure Laterality Date   • HIP ARTHROPLASTY Left    • LAPAROSCOPIC CHOLECYSTECTOMY     • LIPOMA EXCISION     • TOTAL HIP ARTHROPLASTY Right 10/19/2018    Procedure: RIGHT TOTAL  ANTERIOR HIP ARTHROPLASTY;  Surgeon: Mary Be MD;  Location: [facility];  Service: Orthopedics   • TOTAL KNEE ARTHROPLASTY      bilateral   • TOTAL KNEE ARTHROPLASTY REVISION Left 2021    Procedure: LEFT KNEE REVISION;  Surgeon: Mary Be MD;  Location: [facility];  Service: Orthopedics;  Laterality: Left;     General Information     Row Name 21 1251          OT Time and Intention    Document Type  evaluation;therapy note (daily note);discharge evaluation/summary  -     Mode of Treatment  individual therapy;occupational therapy  -     Row Name 21 1259          General Information    Patient Profile Reviewed  yes  -     Prior Level of Function  independent:;transfer;community mobility;all household mobility;gait;ADL's;bed mobility  -     Existing Precautions/Restrictions  fall  -     Barriers to Rehab  none identified  -     Row Name 21 1256          Living  Environment    Lives With  spouse  -[facility] Name 04/07/21 1251          Cognition    Orientation Status (Cognition)  oriented x 4  -       User Key  (r) = Recorded By, (t) = Taken By, (c) = Cosigned By    Initials Name Provider Type     Margaret Cruz, OTR Occupational Therapist        Mobility/ADL's     [facility] Name 04/07/21 1253          Bed Mobility    Bed Mobility  sit-supine;supine-sit  -     Supine-Sit [place] (Bed Mobility)  set up;verbal cues;minimum assist (75% patient effort)  -     Sit-Supine [place] (Bed Mobility)  set up;verbal cues;minimum assist (75% patient effort)  -[facility] Name 04/07/21 1253          Transfers    Transfers  sit-stand transfer;toilet transfer  -     Sit-Stand [place] (Transfers)  set up;standby assist;contact guard  -[facility] Name 04/07/21 1253          Sit-Stand Transfer    Assistive Device (Sit-Stand Transfers)  walker, front-wheeled tsf to shower chair at sink CGA/SBA walker  -[facility] Name 04/07/21 1253          Functional Mobility    Functional Mobility- Ind. Level  supervision required  -     Functional Mobility- Device  rolling walker  -[facility] Name 04/07/21 1253          Activities of Daily Living    BADL Assessment/Intervention  bathing;upper body dressing;lower body dressing;grooming  -[facility] Name 04/07/21 1253          Bathing Assessment/Intervention    [place] Level (Bathing)  bathing skills;upper body;modified independence;lower body;set up;minimum assist (75% patient effort)  -     Position (Bathing)  supported sitting;supported standing  -[facility] Name 04/07/21 1253          Upper Body Dressing Assessment/Training    [place] Level (Upper Body Dressing)  upper body dressing skills;doff;don;pajama/robe;set up;standby assist  -     Position (Upper Body Dressing)  supported sitting  -[facility] Name 04/07/21 1253          Lower Body Dressing Assessment/Training    [place] Level (Lower Body Dressing)   lower body dressing skills;doff;don;other (see comments);minimum assist (75% patient effort) undergarment  -KP     Position (Lower Body Dressing)  supported sitting;supported standing  -KP     Row Name 04/07/21 1253          Grooming Assessment/Training    [place] Level (Grooming)  grooming skills;oral care regimen;wash face, hands;set up;modified independence  -KP     Position (Grooming)  sink side  -KP       User Key  (r) = Recorded By, (t) = Taken By, (c) = Cosigned By    Initials Name Provider Type    Margaret Niño OTR Occupational Therapist        Obj/Interventions     Row Name 04/07/21 1255          Balance    Balance Assessment  sitting static balance;standing static balance  -KP     Static Sitting Balance  WFL  -KP     Static Standing Balance  WFL  -KP     Balance Interventions  standing;sitting;sit to stand;static  -KP       User Key  (r) = Recorded By, (t) = Taken By, (c) = Cosigned By    Initials Name Provider Type    Margaret Niño OTR Occupational Therapist        Goals/Plan     Row Name 04/07/21 1258          Transfer Goal 1 (OT)    Activity/Assistive Device (Transfer Goal 1, OT)  sit-to-stand/stand-to-sit;walker, rolling  -KP     [place] Level/Cues Needed (Transfer Goal 1, OT)  set-up required;standby assist  -KP     Time Frame (Transfer Goal 1, OT)  short term goal (STG);1 day  -KP     Progress/Outcome (Transfer Goal 1, OT)  goal met  -KP     Row Name 04/07/21 1258          Bathing Goal 1 (OT)    Activity/Device (Bathing Goal 1, OT)  upper body bathing;lower body bathing  -KP     [place] Level/Cues Needed (Bathing Goal 1, OT)  standby assist;modified independence;minimum assist (75% or more patient effort)  -KP     Time Frame (Bathing Goal 1, OT)  short term goal (STG);1 day  -KP     Progress/Outcomes (Bathing Goal 1, OT)  goal met  -KP       User Key  (r) = Recorded By, (t) = Taken By, (c) = Cosigned By    Initials Name Provider Type    Margaret Niño  JIM LesterR Occupational Therapist        Clinical Impression     Row Name 04/07/21 1256          Pain Scale: Numbers Pre/Post-Treatment    Pretreatment Pain Rating  3/10  -KP     Posttreatment Pain Rating  3/10  -KP     Pain Location - Side  Left  -KP     Pain Location  knee  -KP     Pain Intervention(s)  Repositioned  -KP     Row Name 04/07/21 1256          Plan of Care Review    Plan of Care Reviewed With  patient  -     Progress  improving  -     Outcome Summary  pt completed OT eval, she was SBA to walk to the BR w rolling walker, tsf to shower seat to wash up at the sink, she was mod I w UBD and UBB, she was min A w. LBD and LBB. She completed grooming skills at the sink mod I. Pt did well overall and states her  can A her at home w. ADLs, ed pt on tub bench for shower tub to A her in tsf into the shower. Ed pt on safety at home w. ADLs and tsf and to have her  A her or be there for safety at first. pt plan to dc home w.  and HH PT. no further OT needs since she has family to A until she gains more ROM in her knee.  -     Row Name 04/07/21 1256          Therapy Assessment/Plan (OT)    Therapy Frequency (OT)  evaluation only  -     Row Name 04/07/21 1256          Therapy Plan Review/Discharge Plan (OT)    Equipment Needs Upon Discharge (OT)  tub bench  -     Anticipated Discharge Disposition (OT)  home with assist  -     Row Name 04/07/21 1256          Positioning and Restraints    Pre-Treatment Position  in bed  -     Post Treatment Position  bed  -KP     In Bed  supine;call light within reach;encouraged to call for assist;exit alarm on  -KP       User Key  (r) = Recorded By, (t) = Taken By, (c) = Cosigned By    Initials Name Provider Type    Margaret Niño, KRISTEL Occupational Therapist        Outcome Measures     Row Name 04/07/21 1255          How much help from another is currently needed...    Putting on and taking off regular lower body clothing?  3  -KP      Bathing (including washing, rinsing, and drying)  3  -     Toileting (which includes using toilet bed pan or urinal)  3  -KP     Putting on and taking off regular upper body clothing  4  -     Taking care of personal grooming (such as brushing teeth)  4  -     Eating meals  4  -KP     AM-PAC 6 Clicks Score (OT)  21  -     Row Name 04/07/21 1258          Functional Assessment    Outcome Measure Options  AM-PAC 6 Clicks Daily Activity (OT)  -       User Key  (r) = Recorded By, (t) = Taken By, (c) = Cosigned By    Initials Name Provider Type    Margaret Niño, OTR Occupational Therapist        Occupational Therapy Education                 Title: PT OT SLP Therapies (Done)     Topic: Occupational Therapy (Resolved)     Point: ADL training (Resolved)     Description:   Instruct learner(s) on proper safety adaptation and remediation techniques during self care or transfers.   Instruct in proper use of assistive devices.              Learning Progress Summary           Patient Acceptance, E,TB, VU,DU by  at 4/7/2021 1259    Comment: ed pt on role of OT, benefit of therapy. ed on safety w ADls, ed on tub bench and to have  there when she gets in for safety.                   Point: Precautions (Resolved)     Description:   Instruct learner(s) on prescribed precautions during self-care and functional transfers.              Learning Progress Summary           Patient Acceptance, E,TB, VU,DU by  at 4/7/2021 1259    Comment: ed pt on role of OT, benefit of therapy. ed on safety w ADls, ed on tub bench and to have  there when she gets in for safety.                   Point: Body mechanics (Resolved)     Description:   Instruct learner(s) on proper positioning and spine alignment during self-care, functional mobility activities and/or exercises.              Learning Progress Summary           Patient Acceptance, E,TB, VU,DU by  at 4/7/2021 1259    Comment: ed pt on role of OT, benefit  of therapy. ed on safety w ADls, ed on tub bench and to have  there when she gets in for safety.                               User Key     Initials Effective Dates Name Provider Type Discipline     06/08/18 -  Margaret Cruz OTR Occupational Therapist OT              OT Recommendation and Plan  Retired Outcome Summary/Treatment Plan (OT)  Anticipated Discharge Disposition (OT): home with assist  Therapy Frequency (OT): evaluation only  Plan of Care Review  Plan of Care Reviewed With: patient  Progress: improving  Outcome Summary: pt completed OT eval, she was SBA to walk to the BR w rolling walker, tsf to shower seat to wash up at the sink, she was mod I w UBD and UBB, she was min A w. LBD and LBB. She completed grooming skills at the sink mod I. Pt did well overall and states her  can A her at home w. ADLs, ed pt on tub bench for shower tub to A her in tsf into the shower. Ed pt on safety at home w. ADLs and tsf and to have her  A her or be there for safety at first. pt plan to dc home w.  and HH PT. no further OT needs since she has family to A until she gains more ROM in her knee.  Plan of Care Reviewed With: patient  Outcome Summary: pt completed OT eval, she was SBA to walk to the BR w rolling walker, tsf to shower seat to wash up at the sink, she was mod I w UBD and UBB, she was min A w. LBD and LBB. She completed grooming skills at the sink mod I. Pt did well overall and states her  can A her at home w. ADLs, ed pt on tub bench for shower tub to A her in tsf into the shower. Ed pt on safety at home w. ADLs and tsf and to have her  A her or be there for safety at first. pt plan to dc home w.  and HH PT. no further OT needs since she has family to A until she gains more ROM in her knee.     Time Calculation:   Time Calculation- OT     Row Name 04/07/21 1301             Time Calculation- OT    OT Start Time  0827  -      OT Stop Time  0900  -       OT Time Calculation (min)  33 min  -      Total Timed Code Minutes- OT  23 minute(s)  -      OT Received On  04/07/21  -        User Key  (r) = Recorded By, (t) = Taken By, (c) = Cosigned By    Initials Name Provider Type    Margaret Niño OTR Occupational Therapist        Therapy Charges for Today     Code Description Service Date Service Provider Modifiers Qty    [id number]  OT SELF CARE/MGMT/TRAIN EA 15 MIN 4/7/2021 Margaret Cruz OTR GO 2    [id number]  OT EVAL LOW COMPLEXITY 2 4/7/2021 Margaret Cruz OTR GO 1               KRISTEL Walker  4/7/2021

## 2021-04-07 NOTE — PROGRESS NOTES
Patient: Grace Jacob  YOB: 1965     Date of Admission: 4/6/2021 11:09 AM Medical Record Number: [id number]     Attending Physician: Mary Be,Sophy    Procedure(s):  LEFT KNEE REVISION Post Operative Day Number: 1    Subjective : No new orthopaedic complaints     Pain Relief: some relief with present medication.     Systemic Complaints: No Complaints  Vitals:    04/06/21 1955 04/06/21 2308 04/07/21 0236 04/07/21 0730   BP: 149/91 149/93 122/74 92/62   BP Location: Right arm Right arm Right arm Right arm   Patient Position: Lying Lying Lying Lying   Pulse: 89 90 88 90   Resp: 16 16 16 16   Temp: 97.1 °F (36.2 °C) 97.3 °F (36.3 °C) 96.9 °F (36.1 °C) 98.1 °F (36.7 °C)   TempSrc: Skin Skin Skin Oral   SpO2: 91% 91% 94% 93%   Weight:       Height:           Physical Exam: 56 y.o. female    General Appearance:       Alert, cooperative, in no acute distress                  Extremities:    Dressing Clean, Dry and Intact         Incision healthy without signs or symptoms of infections         No clinical sign of DVT        Able to do good movements of digits    Pulses:   Pulses palpable and equal bilaterally           Diagnostic Tests:     Results from last 7 days   Lab Units 04/07/21  0527   WBC 10*3/mm3 22.26*   HEMOGLOBIN g/dL 12.1   HEMATOCRIT % 36.5   PLATELETS 10*3/mm3 319     Results from last 7 days   Lab Units 04/07/21  0527   SODIUM mmol/L 136   POTASSIUM mmol/L 4.6   CHLORIDE mmol/L 99   CO2 mmol/L 26.7   BUN mg/dL 13   CREATININE mg/dL 0.82   GLUCOSE mg/dL 144*   CALCIUM mg/dL 8.2*         No results found for: CRP  No results found for: SEDRATE  No results found for: URICACID  No results found for: CRYSTAL  Microbiology Results (last 10 days)     Procedure Component Value - Date/Time    Tissue / Bone Culture - Tissue, Knee, Left [[id number]] Collected: 04/06/21 1351    Lab Status: Preliminary result Specimen: Tissue from Knee, Left Updated: 04/07/21 0640     Tissue Culture No  growth     Gram Stain No WBCs or organisms seen    COVID PRE-OP / PRE-PROCEDURE SCREENING ORDER (NO ISOLATION) - Swab, Nasopharynx [[id number]] Collected: 04/03/21 1510    Lab Status: Final result Specimen: Swab from Nasopharynx Updated: 04/05/21 1200    Narrative:      The following orders were created for panel order COVID PRE-OP / PRE-PROCEDURE SCREENING ORDER (NO ISOLATION) - Swab, Nasopharynx.  Procedure                               Abnormality         Status                     ---------                               -----------         ------                     COVID-19,BIOTAP, NP/OP S...[[id number]]                      Final result                 Please view results for these tests on the individual orders.    COVID-19,BIOTAP, NP/OP SWAB IN TRANSPORT MEDIA OR SALINE 24-36 HR TAT - Swab, Nasopharynx [[id number]] Collected: 04/03/21 1510    Lab Status: Final result Specimen: Swab from Nasopharynx Updated: 04/05/21 1200     SARS-CoV-2 PCR Not Detected     Comment: Nucleic acid specific to SARS-CoV-2 (COVID-19) virus was not detected in  this sample by the TaqPath (TM) COVID-19 Combo Kit.          SARS-CoV-2 (COVID-19) nucleic acid testing performed using [employer] Aptima (R) SARS-CoV-2 Assay or [employer] TaqPath (TM)  COVID-19 Combo Kit as indicated above under Emergency Use Authorization (EUA) from the FDA. Aptima (R) and TaqPath (TM) test performance  characteristics verified by [facility] in accordance with the FDAs Guidance Document (Policy for Diagnostic Tests for Coronavirus Disease-2019  during the Public Health Emergency) issued on March 16, 2020. The laboratory is regulated under CLIA as qualified to perform high-complexity testing  Unless otherwise noted, all testing was performed at [facility], CLIA No. [id number], [place] State Licensee No. [id number]           XR Knee 1 or 2 View Left    Result Date: 4/6/2021   Postsurgical changes.    This report was finalized on 4/6/2021 5:05 PM by  Dr. Syd Ron M.D.              Current Medications:  Scheduled Meds:aspirin, 81 mg, Oral, Q12H  celecoxib, 200 mg, Oral, BID  docusate sodium, 100 mg, Oral, BID  DULoxetine, 60 mg, Oral, Nightly  Scopolamine, 1 patch, Transdermal, Once      Continuous Infusions:lactated ringers, 9 mL/hr, Last Rate: 9 mL/hr (04/06/21 1226)  lactated ringers, 9 mL/hr      PRN Meds:.•  acetaminophen  •  bisacodyl  •  HYDROcodone-acetaminophen  •  HYDROcodone-acetaminophen  •  HYDROmorphone **AND** naloxone  •  melatonin  •  ondansetron **OR** ondansetron    Assessment:    Procedure(s):  LEFT KNEE REVISION    Patient Active Problem List   Diagnosis   • Obesity (BMI 30-39.9)   • Anxiety   • Status post revision of total knee replacement, left   • Chronic pain       PLAN:   Continues current post-op course  Anticoagulation: Aspirin started  Hemovac Drain to be removed today  Dressing Change prn  Mobilize with PT as tolerated    Weight Bearing: WBAT  Discharge Plan: OK to plan for discharge in  tomorrow to home and home health  from orthopadic perspective.      Mary Be MD    Date: 4/7/2021    Time: 08:04 EDT

## 2021-04-07 NOTE — PLAN OF CARE
Goal Outcome Evaluation:  Plan of Care Reviewed With: patient     Outcome Summary: Pt is POD 1 L TKA. Pt reports living with , 4 steps to enter and was IND prior to surgery. Today, pt required min A for bed mobility, cga for sit<>stand, and cga to ambulate 30' with RW. Pt was able to perform SLR so no knee immobilizer needed for mobility. Pt demonstrates deficits in ROM, strength and general functional mobility deficits that may benefit from skilled PT. Anticipate d/c home tomorrow with assist and HH.    Patient was intermittently wearing a face mask during this therapy encounter. Therapist used appropriate personal protective equipment including eye protection, mask, and gloves.  Mask used was standard procedure mask. Appropriate PPE was worn during the entire therapy session. Hand hygiene was completed before and after therapy session. Patient is not in enhanced droplet precautions.

## 2021-04-07 NOTE — PLAN OF CARE
Goal Outcome Evaluation:         Vitals stable.  Drain pulled.  Small drainage at the site.  Ambulating well.  PRN pain meds given.  Will CTM

## 2021-04-08 VITALS
BODY MASS INDEX: 39.41 KG/M2 | SYSTOLIC BLOOD PRESSURE: 132 MMHG | TEMPERATURE: 97.7 F | HEART RATE: 82 BPM | DIASTOLIC BLOOD PRESSURE: 81 MMHG | HEIGHT: 67 IN | WEIGHT: 251.1 LBS | OXYGEN SATURATION: 91 % | RESPIRATION RATE: 16 BRPM

## 2021-04-08 LAB
ANION GAP SERPL CALCULATED.3IONS-SCNC: 8 MMOL/L (ref 5–15)
BASOPHILS # BLD AUTO: 0.04 10*3/MM3 (ref 0–0.2)
BASOPHILS NFR BLD AUTO: 0.3 % (ref 0–1.5)
BUN SERPL-MCNC: 17 MG/DL (ref 6–20)
BUN/CREAT SERPL: 25.4 (ref 7–25)
CALCIUM SPEC-SCNC: 7.9 MG/DL (ref 8.6–10.5)
CHLORIDE SERPL-SCNC: 100 MMOL/L (ref 98–107)
CO2 SERPL-SCNC: 30 MMOL/L (ref 22–29)
CREAT SERPL-MCNC: 0.67 MG/DL (ref 0.57–1)
DEPRECATED RDW RBC AUTO: 48.5 FL (ref 37–54)
EOSINOPHIL # BLD AUTO: 0.3 10*3/MM3 (ref 0–0.4)
EOSINOPHIL NFR BLD AUTO: 2.5 % (ref 0.3–6.2)
ERYTHROCYTE [DISTWIDTH] IN BLOOD BY AUTOMATED COUNT: 14.5 % (ref 12.3–15.4)
GFR SERPL CREATININE-BSD FRML MDRD: 91 ML/MIN/1.73
GLUCOSE SERPL-MCNC: 109 MG/DL (ref 65–99)
HCT VFR BLD AUTO: 33.1 % (ref 34–46.6)
HGB BLD-MCNC: 11 G/DL (ref 12–15.9)
IMM GRANULOCYTES # BLD AUTO: 0.05 10*3/MM3 (ref 0–0.05)
IMM GRANULOCYTES NFR BLD AUTO: 0.4 % (ref 0–0.5)
LYMPHOCYTES # BLD AUTO: 1.57 10*3/MM3 (ref 0.7–3.1)
LYMPHOCYTES NFR BLD AUTO: 13.3 % (ref 19.6–45.3)
MCH RBC QN AUTO: 29.8 PG (ref 26.6–33)
MCHC RBC AUTO-ENTMCNC: 33.2 G/DL (ref 31.5–35.7)
MCV RBC AUTO: 89.7 FL (ref 79–97)
MONOCYTES # BLD AUTO: 0.81 10*3/MM3 (ref 0.1–0.9)
MONOCYTES NFR BLD AUTO: 6.8 % (ref 5–12)
NEUTROPHILS NFR BLD AUTO: 76.7 % (ref 42.7–76)
NEUTROPHILS NFR BLD AUTO: 9.06 10*3/MM3 (ref 1.7–7)
NRBC BLD AUTO-RTO: 0 /100 WBC (ref 0–0.2)
PLATELET # BLD AUTO: 263 10*3/MM3 (ref 140–450)
PMV BLD AUTO: 9.2 FL (ref 6–12)
POTASSIUM SERPL-SCNC: 3.8 MMOL/L (ref 3.5–5.2)
RBC # BLD AUTO: 3.69 10*6/MM3 (ref 3.77–5.28)
SODIUM SERPL-SCNC: 138 MMOL/L (ref 136–145)
WBC # BLD AUTO: 11.83 10*3/MM3 (ref 3.4–10.8)

## 2021-04-08 PROCEDURE — 97110 THERAPEUTIC EXERCISES: CPT

## 2021-04-08 PROCEDURE — 85025 COMPLETE CBC W/AUTO DIFF WBC: CPT | Performed by: ORTHOPAEDIC SURGERY

## 2021-04-08 PROCEDURE — 80048 BASIC METABOLIC PNL TOTAL CA: CPT | Performed by: ORTHOPAEDIC SURGERY

## 2021-04-08 PROCEDURE — 97530 THERAPEUTIC ACTIVITIES: CPT

## 2021-04-08 RX ADMIN — HYDROCODONE BITARTRATE AND ACETAMINOPHEN 1 TABLET: 7.5; 325 TABLET ORAL at 12:22

## 2021-04-08 RX ADMIN — CELECOXIB 200 MG: 200 CAPSULE ORAL at 09:17

## 2021-04-08 RX ADMIN — DOCUSATE SODIUM 100 MG: 100 CAPSULE, LIQUID FILLED ORAL at 09:17

## 2021-04-08 RX ADMIN — HYDROCODONE BITARTRATE AND ACETAMINOPHEN 2 TABLET: 7.5; 325 TABLET ORAL at 06:20

## 2021-04-08 RX ADMIN — ASPIRIN 81 MG: 81 TABLET, COATED ORAL at 09:17

## 2021-04-08 RX ADMIN — HYDROCODONE BITARTRATE AND ACETAMINOPHEN 2 TABLET: 7.5; 325 TABLET ORAL at 01:12

## 2021-04-08 NOTE — PROGRESS NOTES
Continued Stay Note  [facility]     Patient Name: Grace Jacob  MRN: [id number]  Today's Date: 4/8/2021    Admit Date: 4/6/2021    Discharge Plan     Row Name 04/08/21 1617       Plan    Final Discharge Disposition Code  06 - home with home health care    Final Note  Pt to d/c home with [facility].        Discharge Codes    No documentation.       Expected Discharge Date and Time     Expected Discharge Date Expected Discharge Time    Apr 8, 2021             Mercy Wilson RN

## 2021-04-08 NOTE — DISCHARGE SUMMARY
Orthopedic Discharge Summary      Patient: Grace Jacob   YOB: 1965    Medical Record Number: [id number]    Attending Physician: No att. providers found    Consulting Physician(s):   Consulting Physician(s)  Chat With All Active Members    Provider Relationship Specialty    Dwight Dudley MD  Consulting Physician Hospitalist          Date of Admission: 4/6/2021 11:09 AM   Date of Discharge: 4/8/2021     Admitting Diagnosis: Instability of internal left knee prosthesis, initial encounter (CMS/[facility]) [T84.023A]    Procedures Performed  Procedure(s):  LEFT KNEE REVISION       Patient Active Problem List   Diagnosis   • Obesity (BMI 30-39.9)   • Anxiety   • Status post revision of total knee replacement, left   • Chronic pain          Allergies   Allergen Reactions   • Ciprofloxacin Rash   • Penicillins Rash          Discharge Medications      New Medications      Instructions Start Date   Aspirin Low Dose 81 MG EC tablet  Generic drug: aspirin   Take 1 tablet by mouth Every 12 (Twelve) Hours      bisacodyl 5 MG EC tablet  Generic drug: bisacodyl   Take 2 tablets by mouth Daily As Needed for Constipation      HYDROcodone-acetaminophen 7.5-325 MG per tablet  Commonly known as: NORCO   1 tablet, Oral, Every 4 Hours PRN      ondansetron 4 MG tablet  Commonly known as: ZOFRAN   4 mg, Oral, Every 6 Hours PRN      Stool Softener Laxative 100 MG capsule  Generic drug: docusate sodium   Take 1 capsule by mouth 2 (Two) Times a Day         Continue These Medications      Instructions Start Date   CeleBREX 200 MG capsule  Generic drug: celecoxib   200 mg, Oral, 2 Times Daily, TO HOLD 1 WEEK BEFORE SURGERY       DULoxetine 60 MG capsule  Commonly known as: CYMBALTA   60 mg, Oral, Nightly      GLUCOSAMINE PO   2 tablets, Oral, Daily      traMADol 50 MG tablet  Commonly known as: ULTRAM   50 mg, Oral, Every 6 Hours PRN      valACYclovir 500 MG tablet  Commonly known as: VALTREX   Daily PRN                Past  Medical History:   Diagnosis Date   • Anxiety    • Arthritis    • Chronic pain     BILATERAL KNEE   • Frequent UTI    • Herpes    • IBS (irritable bowel syndrome)    • Left knee pain    • PONV (postoperative nausea and vomiting)    • Urinary incontinence         Past Surgical History:   Procedure Laterality Date   • HIP ARTHROPLASTY Left 2020   • LAPAROSCOPIC CHOLECYSTECTOMY     • LIPOMA EXCISION     • TOTAL HIP ARTHROPLASTY Right 10/19/2018    Procedure: RIGHT TOTAL  ANTERIOR HIP ARTHROPLASTY;  Surgeon: Mary Be MD;  Location: [facility];  Service: Orthopedics   • TOTAL KNEE ARTHROPLASTY      bilateral   • TOTAL KNEE ARTHROPLASTY REVISION Left 4/6/2021    Procedure: LEFT KNEE REVISION;  Surgeon: Mary Be MD;  Location: [facility];  Service: Orthopedics;  Laterality: Left;        Social History     Occupational History   • Not on file   Tobacco Use   • Smoking status: Never Smoker   • Smokeless tobacco: Never Used   Vaping Use   • Vaping Use: Never used   Substance and Sexual Activity   • Alcohol use: No   • Drug use: No   • Sexual activity: Defer      Social History     Social History Narrative   • Not on file        Family History   Problem Relation Age of Onset   • BRCA 1/2 Neg Hx    • Breast cancer Neg Hx    • Colon cancer Neg Hx    • Endometrial cancer Neg Hx    • Ovarian cancer Neg Hx    • Malig Hyperthermia Neg Hx    • Uterine cancer Neg Hx        Physical Exam: 56 y.o. female  General Appearance:    Alert, cooperative, in no acute distress                      Vitals:    04/07/21 1910 04/07/21 2258 04/08/21 0249 04/08/21 0700   BP: 145/87 126/76 120/79 132/81   BP Location: Right arm Right arm Right arm Right arm   Patient Position: Sitting Lying Lying Lying   Pulse: 79 101 88 82   Resp: 16 16 16 16   Temp: 97.5 °F (36.4 °C) 97.8 °F (36.6 °C) 97.8 °F (36.6 °C) 97.7 °F (36.5 °C)   TempSrc: Oral Oral Oral Oral   SpO2: 94% 93% 96% 91%   Weight:       Height:             Hospital Course:  56 y.o. female admitted to [facility] to services of No att. providers found with instability left knee replacement  on 4/6/2021 and underwent a revision left knee arthroplasty Per Mary Be MD. Antibiotic and VTE prophylaxis were per SCIP protocols and included  Kefzol  every 8 hours and Aspirin daily . Post-operatively the patient transferred to the post-operative floor where the patient underwent mobilization therapy that included active as well as passive ROM exercises. Opioids were titrated to achieve appropriate pain management to allow for participation in mobilization exercises. Vital signs are now stable. The incision is intact without signs or symptoms of infection. Operative extremity neurovascular status remains intact.     Appropriate education re: incision care, activity levels, medications, and follow up visits was completed and all questions were answered. The patient is now deemed stable for discharge.      DISCHARGE DISPOSITION AND PLAN:  The  Patient is being discharged home with home health for PT  2-3 X per week for 2-3 weeks and nursing care as needed.     DIAGNOSTIC TESTS:     Admission on 04/06/2021, Discharged on 04/08/2021   Component Date Value Ref Range Status   • Tissue Culture 04/06/2021 No growth at 2 days   Preliminary   • Gram Stain 04/06/2021 No WBCs or organisms seen   Preliminary   • Glucose 04/07/2021 144* 65 - 99 mg/dL Final   • BUN 04/07/2021 13  6 - 20 mg/dL Final   • Creatinine 04/07/2021 0.82  0.57 - 1.00 mg/dL Final   • Sodium 04/07/2021 136  136 - 145 mmol/L Final   • Potassium 04/07/2021 4.6  3.5 - 5.2 mmol/L Final   • Chloride 04/07/2021 99  98 - 107 mmol/L Final   • CO2 04/07/2021 26.7  22.0 - 29.0 mmol/L Final   • Calcium 04/07/2021 8.2* 8.6 - 10.5 mg/dL Final   • eGFR Non  Amer 04/07/2021 72  >60 mL/min/1.73 Final   • BUN/Creatinine Ratio 04/07/2021 15.9  7.0 - 25.0 Final   • Anion Gap 04/07/2021 10.3  5.0 - 15.0  mmol/L Final   • WBC 04/07/2021 22.26* 3.40 - 10.80 10*3/mm3 Final   • RBC 04/07/2021 4.09  3.77 - 5.28 10*6/mm3 Final   • Hemoglobin 04/07/2021 12.1  12.0 - 15.9 g/dL Final   • Hematocrit 04/07/2021 36.5  34.0 - 46.6 % Final   • MCV 04/07/2021 89.2  79.0 - 97.0 fL Final   • MCH 04/07/2021 29.6  26.6 - 33.0 pg Final   • MCHC 04/07/2021 33.2  31.5 - 35.7 g/dL Final   • RDW 04/07/2021 14.4  12.3 - 15.4 % Final   • RDW-SD 04/07/2021 46.9  37.0 - 54.0 fl Final   • MPV 04/07/2021 9.0  6.0 - 12.0 fL Final   • Platelets 04/07/2021 319  140 - 450 10*3/mm3 Final   • Neutrophil % 04/07/2021 90.7* 42.7 - 76.0 % Final   • Lymphocyte % 04/07/2021 4.6* 19.6 - 45.3 % Final   • Monocyte % 04/07/2021 3.8* 5.0 - 12.0 % Final   • Eosinophil % 04/07/2021 0.0* 0.3 - 6.2 % Final   • Basophil % 04/07/2021 0.3  0.0 - 1.5 % Final   • Immature Grans % 04/07/2021 0.6* 0.0 - 0.5 % Final   • Neutrophils, Absolute 04/07/2021 20.19* 1.70 - 7.00 10*3/mm3 Final   • Lymphocytes, Absolute 04/07/2021 1.03  0.70 - 3.10 10*3/mm3 Final   • Monocytes, Absolute 04/07/2021 0.85  0.10 - 0.90 10*3/mm3 Final   • Eosinophils, Absolute 04/07/2021 0.00  0.00 - 0.40 10*3/mm3 Final   • Basophils, Absolute 04/07/2021 0.06  0.00 - 0.20 10*3/mm3 Final   • Immature Grans, Absolute 04/07/2021 0.13* 0.00 - 0.05 10*3/mm3 Final   • nRBC 04/07/2021 0.0  0.0 - 0.2 /100 WBC Final   • Glucose 04/08/2021 109* 65 - 99 mg/dL Final   • BUN 04/08/2021 17  6 - 20 mg/dL Final   • Creatinine 04/08/2021 0.67  0.57 - 1.00 mg/dL Final   • Sodium 04/08/2021 138  136 - 145 mmol/L Final   • Potassium 04/08/2021 3.8  3.5 - 5.2 mmol/L Final   • Chloride 04/08/2021 100  98 - 107 mmol/L Final   • CO2 04/08/2021 30.0* 22.0 - 29.0 mmol/L Final   • Calcium 04/08/2021 7.9* 8.6 - 10.5 mg/dL Final   • eGFR Non African Amer 04/08/2021 91  >60 mL/min/1.73 Final   • BUN/Creatinine Ratio 04/08/2021 25.4* 7.0 - 25.0 Final   • Anion Gap 04/08/2021 8.0  5.0 - 15.0 mmol/L Final   • WBC 04/08/2021 11.83* 3.40  - 10.80 10*3/mm3 Final   • RBC 04/08/2021 3.69* 3.77 - 5.28 10*6/mm3 Final   • Hemoglobin 04/08/2021 11.0* 12.0 - 15.9 g/dL Final   • Hematocrit 04/08/2021 33.1* 34.0 - 46.6 % Final   • MCV 04/08/2021 89.7  79.0 - 97.0 fL Final   • MCH 04/08/2021 29.8  26.6 - 33.0 pg Final   • MCHC 04/08/2021 33.2  31.5 - 35.7 g/dL Final   • RDW 04/08/2021 14.5  12.3 - 15.4 % Final   • RDW-SD 04/08/2021 48.5  37.0 - 54.0 fl Final   • MPV 04/08/2021 9.2  6.0 - 12.0 fL Final   • Platelets 04/08/2021 263  140 - 450 10*3/mm3 Final   • Neutrophil % 04/08/2021 76.7* 42.7 - 76.0 % Final   • Lymphocyte % 04/08/2021 13.3* 19.6 - 45.3 % Final   • Monocyte % 04/08/2021 6.8  5.0 - 12.0 % Final   • Eosinophil % 04/08/2021 2.5  0.3 - 6.2 % Final   • Basophil % 04/08/2021 0.3  0.0 - 1.5 % Final   • Immature Grans % 04/08/2021 0.4  0.0 - 0.5 % Final   • Neutrophils, Absolute 04/08/2021 9.06* 1.70 - 7.00 10*3/mm3 Final   • Lymphocytes, Absolute 04/08/2021 1.57  0.70 - 3.10 10*3/mm3 Final   • Monocytes, Absolute 04/08/2021 0.81  0.10 - 0.90 10*3/mm3 Final   • Eosinophils, Absolute 04/08/2021 0.30  0.00 - 0.40 10*3/mm3 Final   • Basophils, Absolute 04/08/2021 0.04  0.00 - 0.20 10*3/mm3 Final   • Immature Grans, Absolute 04/08/2021 0.05  0.00 - 0.05 10*3/mm3 Final   • nRBC 04/08/2021 0.0  0.0 - 0.2 /100 WBC Final       No results found for: URICACID  No results found for: CRYSTAL  Microbiology Results (last 10 days)     Procedure Component Value - Date/Time    Tissue / Bone Culture - Tissue, Knee, Left [[id number]] Collected: 04/06/21 1351    Lab Status: Preliminary result Specimen: Tissue from Knee, Left Updated: 04/08/21 0608     Tissue Culture No growth at 2 days     Gram Stain No WBCs or organisms seen    COVID PRE-OP / PRE-PROCEDURE SCREENING ORDER (NO ISOLATION) - Swab, Nasopharynx [[id number]] Collected: 04/03/21 1510    Lab Status: Final result Specimen: Swab from Nasopharynx Updated: 04/05/21 1200    Narrative:      The following orders were  created for panel order COVID PRE-OP / PRE-PROCEDURE SCREENING ORDER (NO ISOLATION) - Swab, Nasopharynx.  Procedure                               Abnormality         Status                     ---------                               -----------         ------                     COVID-19,BIOTAP, NP/OP S...[[id number]]                      Final result                 Please view results for these tests on the individual orders.    COVID-19,BIOTAP, NP/OP SWAB IN TRANSPORT MEDIA OR SALINE 24-36 HR TAT - Swab, Nasopharynx [[id number]] Collected: 04/03/21 1510    Lab Status: Final result Specimen: Swab from Nasopharynx Updated: 04/05/21 1200     SARS-CoV-2 PCR Not Detected     Comment: Nucleic acid specific to SARS-CoV-2 (COVID-19) virus was not detected in  this sample by the TaqPath (TM) COVID-19 Combo Kit.          SARS-CoV-2 (COVID-19) nucleic acid testing performed using [employer] Aptima (R) SARS-CoV-2 Assay or [employer] TaqPath (TM)  COVID-19 Combo Kit as indicated above under Emergency Use Authorization (EUA) from the FDA. Aptima (R) and TaqPath (TM) test performance  characteristics verified by [facility] in accordance with the FDAs Guidance Document (Policy for Diagnostic Tests for Coronavirus Disease-2019  during the Public Health Emergency) issued on March 16, 2020. The laboratory is regulated under CLIA as qualified to perform high-complexity testing  Unless otherwise noted, all testing was performed at [facility], CLIA No. [id number], [place] State Licensee No. [id number]           XR Knee 1 or 2 View Left    Result Date: 4/6/2021   Postsurgical changes.    This report was finalized on 4/6/2021 5:05 PM by Dr. Syd Ron M.D.        Discharge and Follow up Instructions:     Total Knee Joint Replacement Discharge Instructions:    I. ACTIVITIES:  1. Exercises:  ? Complete exercise program as taught by the hospital physical therapist 2 times per day  ? Exercise program will be advanced by your  home health physical therapist  ? During the day be up ambulating every 2 hours (while awake) for short distances  ? Complete the ankle pump exercises at least 10 times per hour (while awake)  ? Elevate legs most of the day the first week post operatively and thereafter elevate legs when in bed and for at least 30 minutes during the day.   ? Caution must be taken to avoid pillow placement under the bend of the knee as this can led to flexion contractures of the knee. Pillow placement under the heel is encouraged.  ? Use cold packs 20-30 minutes approximately 5 times per day. This should be done before and after completing your exercises and at any time you are experiencing pain/ stiffness in your operative extremity.      2. Activities of Daily Living:  ? No tub baths, hot tubs, or swimming pools for 4 weeks  ? May shower and let water run over the incision on post-operative day #5 if no drainage. Do not scrub or rub the incision. Simply let the water run over the incision and pat dry.    II. Restrictions  ? Do not cross legs or kneel  ? Your surgeon will discuss with you when you will be able to drive again. Usual guidelines are you are to be off pain medications prior to driving.  ? Weight bearing is as tolerated  ? First week stay inside on even terrain. May go up and down stairs one stair at a time utilizing the hand rail.  ? After one week, you may venture outside.    III. Precautions:  ? Everyone that comes near you should wash their hands  ? No elective dental, genital-urinary, or colon procedures or surgical procedures for 12 weeks after surgery unless absolutely necessary.  ?  If dental work or surgical procedure is deemed absolutely necessary, you will need to contact your surgeon as you will need to take antibiotics 1 hour prior to any dental work (including teeth cleanings).  ? Please discuss with your surgeon prophylactic antibiotics as the length of time this intervention will be necessary for you  varies with each patient’s health history and situation.  ? Avoid sick people. If you must be around someone who is ill, they should wear a mask.  ? Avoid visits to the Emergency Room or Urgent Care. If you feel you need to go to the emergency room, please notify your surgeon.    ? Stockings are to be worn for one week after surgery and are to be placed on in the morning and removed at night. Observe your skin when stocking is removed for any problems. Monitor the stockings to ensure that any swelling is not causing the stockings to become too tight. In this case, remove stockings immediately.    IV. INCISION CARE:  ? Wash your hands prior to dressing changes  ? Change the dressing as needed to keep incision clean and dry. Utilize dry gauze and paper tape. Avoid touching the side of the gauze that goes against the incision with your hands.  ? No creams or ointments to the incision  ? May remove dressing once the incision is free of drainage  ? Do not touch or pick at the incision  ? Check incision every day and notify surgeon immediately if any of the following signs or symptoms are noted:  o Increase in redness  o Increase in swelling around the incision and of the entire extremity  o Increase in pain  o Drainage oozing from the incision  o Pulling apart of the edges of the incision  o Increase in overall body temperature (greater than 100.5 degrees)    ?  You have absorbable sutures with steristrips, please do not remove the steri strips for 14 days, you can shower on them 6 days after surgery.    V. Medications:   1. Anticoagulants: You will be discharged on an anticoagulant. This is a prophylactic medication that helps prevent blood clots during your post-operative period.  You will be on Aspirin  81 mg twice daily for 30 days. If you were on Aspirin 81 mg prior to surgery you can go back to home dose once the 30 days are completed.     ? While taking the anticoagulant, you should avoid taking any additional  aspirin, ibuprofen (Advil or Motrin), Aleve (Naprosyn) or other non-steroidal anti-inflammatory medications.   ? Notify surgeon immediately if any jesus bleeding is noted in the urine, stool, emesis, or from the nose or the incision. Blood in the stool will often appear as black rather than red. Blood in urine may appear as pink. Blood in emesis may appear as brown/black like coffee grounds.  ? You will need to apply pressure for longer periods of time to any cuts or abrasions to stop bleeding  ? Avoid alcohol while taking anticoagulants    2. Stool Softeners: You will be at greater risk of constipation after surgery due to being less mobile and the pain medications.   ? Take stool softeners as instructed by your surgeon while on pain medications. Over the counter Colace 100 mg 1-2 capsules twice daily.   ? If stools become too loose or too frequent, please decreases the dosage or stop the stool softener.  ? If constipation occurs despite use of stool softeners, you are to continue the stool softeners and add a laxative (Milk of Magnesia 1 ounce daily as needed).  ? Dulcolax oral tabs or suppository, or a fleets enema can also be utilized for constipation and can be obtained over the counter.   ? If above interventions are unsuccessful in inducing bowel movements, please contact your surgeon's office / family physician's office.  ? Drink plenty of fluids, and eat fruits and vegetables during your recovery time    3. Pain Medications utilized after surgery are narcotics and the law requires that the following information be given to all patients that are prescribed narcotics:  ? CLASSIFICATION: Pain medications are called Opioids and are narcotics  ? LEGALITIES: It is illegal to share narcotics with others and to drive within 24 hours of taking narcotics  ? POTENTIAL SIDE EFFECTS: Potential side effects of opioids include: nausea, vomiting, itching, dizziness, drowsiness, dry mouth, constipation, and difficulty  urinating.  ? POTENTIAL ADVERSE EFFECTS:   o Opioid tolerance can develop with use of pain medications and this simply means that it requires more and more of the medication to control pain; however, this is seen more in patients that use opioids for longer periods of time.  o Opioid dependence can develop with use of Opioids and this simply means that to stop the medication can cause withdrawal symptoms; however, this is seen with patients that use Opioids for longer periods of time.  o Opioid addiction can develop with use of Opioids and the incidence of this is very unlikely in patients who take the medications as ordered and stop the medications as instructed.  o Opioid overdose can be dangerous, but is unlikely when the medication is taken as ordered and stopped when ordered. It is important not to mix opioids with alcohol or with and type of sedative such as Benadryl as this can lead to over sedation and respiratory difficulty.  ? DOSAGE:   o Pain medications will need to be taken consistently for the first week to decrease pain and promote adequate pain relief and participation in physical therapy.  o After the initial surgical pain begins to resolve, you may begin to decrease the pain medication. By the end of 6 weeks, you should be off of pain medications.  o Refills will not be given by the office during evening hours, on weekends, or after 6 weeks post-op.  o To seek refills on pain medications during the initial 6 week post-operative period, you must call the office 48 hours in advance to request the refill. The office will then notify you when to  the prescription. DO NOT wait until you are out of the medication to request a refill.    V. FOLLOW-UP VISITS:  ? You will need to follow up in the office with your surgeon on April 21 ,2021.  Please call this number [phone] to schedule this appointment.  ? If you have any concerns or suspected complications prior to your follow up visit, please  call your surgeons office. Do not wait until your appointment time if you suspect complications. These will need to be addressed in the office promptly.      Date: 4/8/2021    Mary Be MD    CC: Tiffanie Dickson PA; Mary Be MD No att. providers found

## 2021-04-08 NOTE — PROGRESS NOTES
[facility] Medicine Services  PROGRESS NOTE    Patient Name: Grace Jacob  : 1965  MRN: [id number]    Date of Admission: 2021  Primary Care Physician: Tiffanie Dickson PA    Subjective   Subjective     CC:  Follow-up medical management from joint replacement    HPI:  Feels well today.  Pain reasonably controlled.  Patient plans to discharge today.    Review of Systems  No current fevers or chills  No current shortness of breath or cough  No current nausea, vomiting, or diarrhea  No current chest pain or palpitations      Objective   Objective     Vital Signs:   Temp:  [97.1 °F (36.2 °C)-98.1 °F (36.7 °C)] 97.7 °F (36.5 °C)  Heart Rate:  [] 82  Resp:  [16] 16  BP: (115-145)/(71-87) 132/81        Physical Exam:  Constitutional:Awake, alert  HENT: NCAT, mucous membranes moist, neck supple  Respiratory: Clear to auscultation bilaterally, respiratory effort normal, nonlabored breathing   Cardiovascular: RRR, normal radial pulses  Gastrointestinal: Positive bowel sounds, soft, nontender, nondistended  Musculoskeletal: Normal musculature for age, no lower extremity edema  Psychiatric: Appropriate affect, cooperative, conversational  Neurologic: No slurred speech or facial droop, follows commands  Skin: no rashes or jaundice, warm      Results Reviewed:  Results from last 7 days   Lab Units 21  0451 21  0527   WBC 10*3/mm3 11.83* 22.26*   HEMOGLOBIN g/dL 11.0* 12.1   HEMATOCRIT % 33.1* 36.5   PLATELETS 10*3/mm3 263 319     Results from last 7 days   Lab Units 21  0451 21  0527   SODIUM mmol/L 138 136   POTASSIUM mmol/L 3.8 4.6   CHLORIDE mmol/L 100 99   CO2 mmol/L 30.0* 26.7   BUN mg/dL 17 13   CREATININE mg/dL 0.67 0.82   GLUCOSE mg/dL 109* 144*   CALCIUM mg/dL 7.9* 8.2*     Estimated Creatinine Clearance: 120.8 mL/min (by C-G formula based on SCr of 0.67 mg/dL).    Microbiology Results Abnormal     Procedure Component Value - Date/Time    Tissue / Bone Culture - Tissue,  Knee, Left [[id number]] Collected: 04/06/21 1351    Lab Status: Preliminary result Specimen: Tissue from Knee, Left Updated: 04/08/21 0608     Tissue Culture No growth at 2 days     Gram Stain No WBCs or organisms seen          Imaging Results (Last 24 Hours)     ** No results found for the last 24 hours. **              I have reviewed the medications:  Scheduled Meds:aspirin, 81 mg, Oral, Q12H  celecoxib, 200 mg, Oral, BID  docusate sodium, 100 mg, Oral, BID  DULoxetine, 60 mg, Oral, Nightly      Continuous Infusions:   PRN Meds:.•  acetaminophen  •  bisacodyl  •  HYDROcodone-acetaminophen  •  HYDROcodone-acetaminophen  •  HYDROmorphone **AND** naloxone  •  melatonin  •  ondansetron **OR** ondansetron    Assessment/Plan   Assessment & Plan     Active Hospital Problems    Diagnosis  POA   • Chronic pain [G89.29]  Yes   • Status post revision of total knee replacement, left [Z96.652]  Not Applicable   • Obesity (BMI 30-39.9) [E66.9]  Yes   • Anxiety [F41.9]  Yes      Resolved Hospital Problems    Diagnosis Date Resolved POA   • **Instability of internal left knee prosthesis (CMS/[facility]) [T84.023A] 04/06/2021 Not Applicable        Brief Hospital Course to date:  Grace Jacob is a 56 y.o. female     Postoperative leukocytosis: Patient without infectious symptoms.  Likely reactive due to surgery which is commonly seen after joint replacement.    Leukocytosis much improved and mostly resolved today.  No infectious symptoms.  Appears to be doing well     Status post total knee replacement: Postoperative management per surgery.  Otherwise supportive care and symptomatic treatment.    Provided encouragement     Obesity: Weight loss recommended.  Complicates arthritis issues and overall health.  Recommend improved diet and exercise after her replacement.     Chronic pain: Continue home regimen for now.  Chronically tolerates Celebrex.  Continue Cymbalta.  Other postoperative pain medicine as needed, monitor for any signs of  sedation.     Anxiety: Continue Cymbalta.  Stable.  Provide encouragement.    Patient appears to be medically stable for discharge from hospitalist perspective today.  At this point we will sign off.  If there are any questions or concerns please do not hesitate to contact our service.     CODE STATUS:   Code Status and Medical Interventions:   Ordered at: 04/06/21 6540     Level Of Support Discussed With:    Patient     Code Status:    CPR     Medical Interventions (Level of Support Prior to Arrest):    Full       Dwight Dudley MD  04/08/21

## 2021-04-08 NOTE — THERAPY TREATMENT NOTE
Patient Name: Grace Jacob  : 1965    MRN: [id number]                              Today's Date: 2021       Admit Date: 2021    Visit Dx:     ICD-10-CM ICD-9-CM   1. Status post revision of total knee replacement, left  Z96.652 V43.65   2. Instability of left knee joint  M25.362 718.86     Patient Active Problem List   Diagnosis   • Obesity (BMI 30-39.9)   • Anxiety   • Status post revision of total knee replacement, left   • Chronic pain     Past Medical History:   Diagnosis Date   • Anxiety    • Arthritis    • Chronic pain     BILATERAL KNEE   • Frequent UTI    • Herpes    • IBS (irritable bowel syndrome)    • Left knee pain    • PONV (postoperative nausea and vomiting)    • Urinary incontinence      Past Surgical History:   Procedure Laterality Date   • HIP ARTHROPLASTY Left    • LAPAROSCOPIC CHOLECYSTECTOMY     • LIPOMA EXCISION     • TOTAL HIP ARTHROPLASTY Right 10/19/2018    Procedure: RIGHT TOTAL  ANTERIOR HIP ARTHROPLASTY;  Surgeon: Mary Be MD;  Location: [facility];  Service: Orthopedics   • TOTAL KNEE ARTHROPLASTY      bilateral   • TOTAL KNEE ARTHROPLASTY REVISION Left 2021    Procedure: LEFT KNEE REVISION;  Surgeon: Mary Be MD;  Location: [facility];  Service: Orthopedics;  Laterality: Left;     General Information     Row Name 21          Physical Therapy Time and Intention    Document Type  therapy note (daily note)  -CF     Mode of Treatment  physical therapy;individual therapy  -CF     Row Name 21          General Information    Patient Profile Reviewed  yes  -CF     Existing Precautions/Restrictions  fall  -CF     Row Name 21          Cognition    Orientation Status (Cognition)  oriented x 4  -CF     Row Name 21          Safety Issues, Functional Mobility    Impairments Affecting Function (Mobility)  endurance/activity tolerance;strength;range of motion (ROM);pain  -CF       User Key  (r) =  Recorded By, (t) = Taken By, (c) = Cosigned By    Initials Name Provider Type    CF Jeniffer Valencia PT Physical Therapist        Mobility     Row Name 04/08/21 0841          Bed Mobility    Bed Mobility  supine-sit  -CF     Supine-Sit [place] (Bed Mobility)  minimum assist (75% patient effort);verbal cues  -CF     Comment (Bed Mobility)  assist at LLE for supine to sit  -CF     Row Name 04/08/21 0841          Sit-Stand Transfer    Sit-Stand [place] (Transfers)  contact guard  -CF     Assistive Device (Sit-Stand Transfers)  walker, front-wheeled  -CF     Row Name 04/08/21 0841          Gait/Stairs (Locomotion)    [place] Level (Gait)  contact guard  -CF     Assistive Device (Gait)  walker, front-wheeled  -CF     Distance in Feet (Gait)  10' then 80'  -CF     Deviations/Abnormal Patterns (Gait)  genaro decreased;gait speed decreased;antalgic  -CF     Bilateral Gait Deviations  forward flexed posture  -CF     [place] Level (Stairs)  contact guard  -CF     Number of Steps (Stairs)  4  -CF     Ascending Technique (Stairs)  step-to-step  -CF     Descending Technique (Stairs)  step-to-step  -CF     Comment (Gait/Stairs)  Cues for reciprocal gait  -CF     Row Name 04/08/21 0841          Mobility    Extremity Weight-bearing Status  left lower extremity  -CF     Left Lower Extremity (Weight-bearing Status)  weight-bearing as tolerated (WBAT)  -CF       User Key  (r) = Recorded By, (t) = Taken By, (c) = Cosigned By    Initials Name Provider Type     Jeniffer Valencia PT Physical Therapist        Obj/Interventions     Row Name 04/08/21 0851          Range of Motion Comprehensive    Comment, General Range of Motion  R approx 5-80  -CF     Row Name 04/08/21 0851          Motor Skills    Therapeutic Exercise  other (see comments) tka exercises x10  -CF       User Key  (r) = Recorded By, (t) = Taken By, (c) = Cosigned By    Initials Name Provider Type    Jeniffer Ingram PT Physical Therapist         Goals/Plan    No documentation.       Clinical Impression     Row Name 04/08/21 0852          Pain    Additional Documentation  Pain Scale: Numbers Pre/Post-Treatment (Group)  -CF     Row Name 04/08/21 0852          Pain Scale: Numbers Pre/Post-Treatment    Pretreatment Pain Rating  1/10  -CF     Posttreatment Pain Rating  7/10  -CF     Pain Location - Side  Left  -CF     Pain Location  knee  -CF     Pain Intervention(s)  Medication (See MAR);Repositioned;Ambulation/increased activity;Rest;Cold pack;Elevated  -CF     Row Name 04/08/21 0852          Plan of Care Review    Plan of Care Reviewed With  patient  -CF     Outcome Summary  Pt progressed well with PT. Pt increased ambulation distance and completed stairs without LOB or safety concerns. Anticipate d/c home today with assist and HH.  -CF     Row Name 04/08/21 0852          Vital Signs    O2 Delivery Pre Treatment  room air  -CF     Row Name 04/08/21 0852          Positioning and Restraints    Pre-Treatment Position  in bed  -CF     Post Treatment Position  chair  -CF     In Chair  notified nsg;reclined;call light within reach;encouraged to call for assist;exit alarm on  -CF       User Key  (r) = Recorded By, (t) = Taken By, (c) = Cosigned By    Initials Name Provider Type    CF Jeniffer Valencia, PT Physical Therapist        Outcome Measures     Row Name 04/08/21 0855          How much help from another person do you currently need...    Turning from your back to your side while in flat bed without using bedrails?  3  -CF     Moving from lying on back to sitting on the side of a flat bed without bedrails?  3  -CF     Moving to and from a bed to a chair (including a wheelchair)?  3  -CF     Standing up from a chair using your arms (e.g., wheelchair, bedside chair)?  3  -CF     Climbing 3-5 steps with a railing?  3  -CF     To walk in hospital room?  3  -CF     AM-PAC 6 Clicks Score (PT)  18  -CF     Row Name 04/08/21 0855          Functional Assessment     Outcome Measure Options  AM-PAC 6 Clicks Basic Mobility (PT)  -CF       User Key  (r) = Recorded By, (t) = Taken By, (c) = Cosigned By    Initials Name Provider Type    CF Jeniffer Valencia PT Physical Therapist        Physical Therapy Education                 Title: PT OT SLP Therapies (Done)     Topic: Physical Therapy (Done)     Point: Mobility training (Done)     Learning Progress Summary           Patient Acceptance, E, VU by CF at 4/8/2021 0855    Acceptance, E, VU,NR by CF at 4/7/2021 1137                   Point: Home exercise program (Done)     Learning Progress Summary           Patient Acceptance, E, VU by CF at 4/8/2021 0855    Acceptance, E, VU,NR by CF at 4/7/2021 1137                   Point: Body mechanics (Done)     Learning Progress Summary           Patient Acceptance, E, VU by CF at 4/8/2021 0855    Acceptance, E, VU,NR by CF at 4/7/2021 1137                   Point: Precautions (Done)     Learning Progress Summary           Patient Acceptance, E, VU by CF at 4/8/2021 0855    Acceptance, E, VU,NR by CF at 4/7/2021 1137                               User Key     Initials Effective Dates Name Provider Type Discipline     09/02/20 -  Jeniffer Valencia PT Physical Therapist PT              PT Recommendation and Plan  Planned Therapy Interventions (PT): balance training, bed mobility training, gait training, home exercise program, ROM (range of motion), stair training, patient/family education, strengthening, transfer training, stretching  Plan of Care Reviewed With: patient  Outcome Summary: Pt progressed well with PT. Pt increased ambulation distance and completed stairs without LOB or safety concerns. Anticipate d/c home today with assist and HH.     Time Calculation:   PT Charges     Row Name 04/08/21 0841             Time Calculation    Start Time  0834  -      Stop Time  0900  -      Time Calculation (min)  26 min  -      PT Received On  04/08/21  -      PT - Next Appointment   04/09/21  -        User Key  (r) = Recorded By, (t) = Taken By, (c) = Cosigned By    Initials Name Provider Type    CF Jeniffer Valencia, PT Physical Therapist        Therapy Charges for Today     Code Description Service Date Service Provider Modifiers Qty    40718287991 HC PT EVAL LOW COMPLEXITY 2 4/7/2021 Jeniffer Valencia, PT GP 1    71530052199 HC PT THER PROC EA 15 MIN 4/7/2021 Jeniffer Valencia, PT GP 2    25753465208 HC PT THERAPEUTIC ACT EA 15 MIN 4/7/2021 Jeniffer Valencia, PT GP 1    63175718802 HC PT THER PROC EA 15 MIN 4/8/2021 Jeniffer Valencia, PT GP 1    16177462790 HC PT THERAPEUTIC ACT EA 15 MIN 4/8/2021 Jeniffer Valencia, PT GP 1          PT G-Codes  Outcome Measure Options: AM-PAC 6 Clicks Basic Mobility (PT)  AM-PAC 6 Clicks Score (PT): 18  AM-PAC 6 Clicks Score (OT): 21    Jeniffer Valencia PT  4/8/2021

## 2021-04-08 NOTE — PLAN OF CARE
Goal Outcome Evaluation:  Plan of Care Reviewed With: patient     Outcome Summary: Pt progressed well with PT. Pt increased ambulation distance and completed stairs without LOB or safety concerns. Anticipate d/c home today with assist and HH.    Patient was intermittently wearing a face mask during this therapy encounter. Therapist used appropriate personal protective equipment including eye protection, mask, and gloves.  Mask used was standard procedure mask. Appropriate PPE was worn during the entire therapy session. Hand hygiene was completed before and after therapy session. Patient is not in enhanced droplet precautions.

## 2021-04-08 NOTE — PLAN OF CARE
Problem: Adult Inpatient Plan of Care  Goal: Plan of Care Review  Outcome: Ongoing, Progressing  Flowsheets  Taken 4/8/2021 0422 by Caity Domínguez, RN  Progress: improving  Outcome Summary: VSS, A&Ox4, norco for pain, dilaudid once for breakthrough pain, up with walker to BR, voiding well, stable on room air, will continue to monitor.

## 2021-04-08 NOTE — PAYOR COMM NOTE
"DISCHARGED    REF #[id number]        Grace Leija (56 y.o. Female)     Date of Birth Social Security Number Address Home Phone MRN    1965  [address]  [place] [id number] [phone] [id number]    Taoist Marital Status          Islam        Admission Date Admission Type Admitting Provider Attending Provider Department, Room/Bed    4/6/21 Elective Mary Be MD  [address], P782/1    Discharge Date Discharge Disposition Discharge Destination        4/8/2021 Home-Health Care Svc              Attending Provider: (none)   Allergies: Ciprofloxacin, Penicillins    Isolation: None   Infection: COVID (History) (04/06/21)   Code Status: CPR    Ht: 170.2 cm (67\")   Wt: 114 kg (251 lb 1.7 oz)    Admission Cmt: None   Principal Problem: Instability of internal left knee prosthesis (CMS/HCC) [T84.023A]                 Active Insurance as of 4/6/2021     Primary Coverage     Payor Plan Insurance Group Employer/Plan Group    [employer] EM[employer]EE [id number]     Payor Plan Address Payor Plan Phone Number Payor Plan Fax Number Effective Dates    [address] [phone]  1/1/2015 - None Entered    [place] [address]       Subscriber Name Subscriber Birth Date Member ID       GRACE LEIJA 1965 [id number]                 Emergency Contacts      (Rel.) Home Phone Work Phone Mobile Phone    Ant Leija (Spouse) [phone] -- [phone]            Discharge Order (From admission, onward)     Start     Ordered    04/07/21 2254  Discharge patient  Once     Expected Discharge Date: 04/08/21    Discharge Disposition: Home-Health Care Svc    Physician of Record for Attribution - Please select from Treatment Team: MARY BE [[id number]]    Review needed by CMO to determine Physician of Record: No       Question Answer Comment   Physician of Record for Attribution - Please select from Treatment Team ALIZA" MARY GIRON    Review needed by CMO to determine Physician of Record No        04/07/21 0037

## 2021-04-09 LAB
BACTERIA SPEC AEROBE CULT: NORMAL
GRAM STN SPEC: NORMAL

## 2021-04-11 LAB — BACTERIA SPEC ANAEROBE CULT: NORMAL

## 2021-07-19 RX ORDER — VALACYCLOVIR HYDROCHLORIDE 500 MG/1
TABLET, FILM COATED ORAL
Qty: 30 TABLET | Refills: 8 | Status: SHIPPED | OUTPATIENT
Start: 2021-07-19 | End: 2022-05-19

## 2021-07-20 ENCOUNTER — TRANSCRIBE ORDERS (OUTPATIENT)
Dept: ADMINISTRATIVE | Facility: HOSPITAL | Age: 56
End: 2021-07-20

## 2021-07-20 DIAGNOSIS — Z12.31 VISIT FOR SCREENING MAMMOGRAM: Primary | ICD-10-CM

## 2021-08-24 ENCOUNTER — HOSPITAL ENCOUNTER (OUTPATIENT)
Dept: MAMMOGRAPHY | Facility: HOSPITAL | Age: 56
Discharge: HOME | End: 2021-08-24
Admitting: OBSTETRICS & GYNECOLOGY

## 2021-08-24 DIAGNOSIS — Z12.31 VISIT FOR SCREENING MAMMOGRAM: ICD-10-CM

## 2021-08-24 PROCEDURE — 77067 SCR MAMMO BI INCL CAD: CPT

## 2021-08-24 PROCEDURE — 77067 SCR MAMMO BI INCL CAD: CPT | Performed by: RADIOLOGY

## 2021-08-24 PROCEDURE — 77063 BREAST TOMOSYNTHESIS BI: CPT

## 2021-08-24 PROCEDURE — 77063 BREAST TOMOSYNTHESIS BI: CPT | Performed by: RADIOLOGY

## 2021-10-18 ENCOUNTER — OFFICE VISIT (OUTPATIENT)
Dept: CARDIOLOGY | Facility: CLINIC | Age: 56
End: 2021-10-18

## 2021-10-18 VITALS
SYSTOLIC BLOOD PRESSURE: 130 MMHG | BODY MASS INDEX: 38.77 KG/M2 | HEIGHT: 67 IN | TEMPERATURE: 97 F | DIASTOLIC BLOOD PRESSURE: 72 MMHG | HEART RATE: 102 BPM | RESPIRATION RATE: 12 BRPM | WEIGHT: 247 LBS | OXYGEN SATURATION: 96 %

## 2021-10-18 DIAGNOSIS — R07.89 CHEST PAIN, ATYPICAL: Primary | ICD-10-CM

## 2021-10-18 DIAGNOSIS — R06.02 SHORTNESS OF BREATH: ICD-10-CM

## 2021-10-18 PROCEDURE — 99204 OFFICE O/P NEW MOD 45 MIN: CPT | Performed by: INTERNAL MEDICINE

## 2021-10-18 RX ORDER — SOLIFENACIN SUCCINATE 10 MG/1
10 TABLET, FILM COATED ORAL DAILY
COMMUNITY
Start: 2021-07-30 | End: 2022-03-11

## 2021-10-18 RX ORDER — NAPROXEN 500 MG/1
500 TABLET ORAL 2 TIMES DAILY WITH MEALS
COMMUNITY
Start: 2021-08-25 | End: 2022-06-29

## 2021-10-18 RX ORDER — FLUTICASONE PROPIONATE 50 MCG
SPRAY, SUSPENSION (ML) NASAL
COMMUNITY
Start: 2021-10-17 | End: 2023-01-23

## 2021-10-18 NOTE — PROGRESS NOTES
[facility]  [facility] CARDIOLOGY  [address] [place] [address]  Dept: [phone]  Dept Fax: [phone]    Grace Jacob  1965    New Patient Office Note    History of Present Illness:  Grace Jacob is a 56 y.o. female who presents to the clinic for New patient. Chest pain- She has been having sharp pain, for the last year, usually after eating, with nauseas, and vomiting,, , she also has SOB on exertion, walking 1 block. She has had multiples orthopedist surgery in the last year, left knee replacement right knee replacement and also Hip, right, she is not smoker, no diabetes. - Her EKG sinus with incomplete RBBB ,her cardiac exam is normal BP is 122,84, her cardiac exam is normal, her complaints seems likely GI,  She also has SOB on exertion, has had a sleep test and this showed mild disease, will get an echo to evaluate EF, WM and also pulmonary pressure     The following portions of the patient's history were reviewed and updated as appropriate: allergies, current medications, past family history, past medical history, past social history, past surgical history and problem list.    Medications:  DULoxetine  fluticasone  GLUCOSAMINE PO  naproxen  ondansetron  solifenacin  traMADol  valACYclovir    Subjective  Allergies   Allergen Reactions   • Ciprofloxacin Rash   • Penicillins Rash        Past Medical History:   Diagnosis Date   • Anxiety    • Arthritis    • Asthma    • Chronic pain     BILATERAL KNEE   • Frequent UTI    • Gallbladder problem    • Herpes    • IBS (irritable bowel syndrome)    • Left knee pain    • PONV (postoperative nausea and vomiting)    • Pregnancy    • Urinary incontinence        Past Surgical History:   Procedure Laterality Date   • GALLBLADDER SURGERY     • HIP ARTHROPLASTY Left 2020   • LAPAROSCOPIC CHOLECYSTECTOMY     • LIPOMA EXCISION     • TOTAL HIP ARTHROPLASTY Right 10/19/2018    Procedure: RIGHT TOTAL  ANTERIOR HIP  "ARTHROPLASTY;  Surgeon: Mary Be MD;  Location: [facility] OR;  Service: Orthopedics   • TOTAL KNEE ARTHROPLASTY      bilateral   • TOTAL KNEE ARTHROPLASTY REVISION Left 4/6/2021    Procedure: LEFT KNEE REVISION;  Surgeon: Mary Be MD;  Location: [facility] OR;  Service: Orthopedics;  Laterality: Left;       Family History   Problem Relation Age of Onset   • Alzheimer's disease Mother    • Diabetes Father    • Coronary artery disease Father    • Cancer Brother    • Depression Brother    • Diabetes Brother    • BRCA 1/2 Neg Hx    • Breast cancer Neg Hx    • Colon cancer Neg Hx    • Endometrial cancer Neg Hx    • Ovarian cancer Neg Hx    • Malig Hyperthermia Neg Hx    • Uterine cancer Neg Hx         Social History     Socioeconomic History   • Marital status:    Tobacco Use   • Smoking status: Never Smoker   • Smokeless tobacco: Never Used   Vaping Use   • Vaping Use: Never used   Substance and Sexual Activity   • Alcohol use: No   • Drug use: No   • Sexual activity: Defer       Review of Systems   Constitutional: Negative.    HENT: Negative.    Respiratory: Positive for shortness of breath.    Cardiovascular: Positive for chest pain.   Endocrine: Negative.    Genitourinary: Negative.    Musculoskeletal: Negative.    Skin: Negative.    Allergic/Immunologic: Negative.    Neurological: Negative.    Hematological: Negative.    Psychiatric/Behavioral: Negative.    All other systems reviewed and are negative.      Cardiovascular Procedures    ECHO/MUGA:   STRESS TESTS:   CARDIAC CATH:   DEVICES:   HOLTER:   CT/MRI:   VASCULAR:   CARDIOTHORACIC:     Objective  Vitals:    10/18/21 1403   BP: 130/72   BP Location: Left arm   Patient Position: Sitting   Cuff Size: Adult   Pulse: 102   Resp: 12   Temp: 97 °F (36.1 °C)   TempSrc: Infrared   SpO2: 96%   Weight: 112 kg (247 lb)   Height: 170.2 cm (67\")   PainSc: 0-No pain       Physical Exam  Constitutional:       Appearance: Healthy " appearance. Not in distress.   Neck:      Vascular: No JVR. JVD normal.   Pulmonary:      Effort: Pulmonary effort is normal.      Breath sounds: Normal breath sounds. No wheezing. No rhonchi. No rales.   Chest:      Chest wall: Not tender to palpatation.   Cardiovascular:      PMI at left midclavicular line. Normal rate. Regular rhythm. Normal S1. Normal S2.      Murmurs: There is no murmur.      No gallop. No click. No rub.   Pulses:     Intact distal pulses.   Edema:     Peripheral edema absent.   Abdominal:      General: Bowel sounds are normal.      Palpations: Abdomen is soft.      Tenderness: There is no abdominal tenderness.   Musculoskeletal: Normal range of motion.         General: No tenderness. Skin:     General: Skin is warm and dry.   Neurological:      General: No focal deficit present.      Mental Status: Alert and oriented to person, place and time.          Diagnostic Data  Procedures    Assessment and Plan  Diagnoses and all orders for this visit:    Chest pain, atypical- Likely non cardiac, will get a echo  Shortness of breath- Possible related to her weight, , has had sleep test near normal, will get an echo to evaluate the RVSP    Other orders  -     fluticasone (FLONASE) 50 MCG/ACT nasal spray  -     naproxen (NAPROSYN) 500 MG tablet; Take 500 mg by mouth 2 (Two) Times a Day With Meals.  -     solifenacin (VESICARE) 10 MG tablet; Take 10 mg by mouth Daily.        No follow-ups on file.    Zachary Lockhart MD  10/18/2021

## 2021-10-22 ENCOUNTER — PATIENT ROUNDING (BHMG ONLY) (OUTPATIENT)
Dept: CARDIOLOGY | Facility: CLINIC | Age: 56
End: 2021-10-22

## 2021-10-22 NOTE — PROGRESS NOTES
October 22, 2021    Hello, may I speak with Grace Jacob?    My name is veena    I am  with [facility]  [facility] CARDIOLOGY  [address] [place] [address].    Before we get started may I verify your date of birth? 1965    I am calling to officially welcome you to our practice and ask about your recent visit. Is this a good time to talk? no    Unable to reach patient     10-22-21.

## 2021-10-28 ENCOUNTER — HOSPITAL ENCOUNTER (OUTPATIENT)
Dept: CT IMAGING | Facility: HOSPITAL | Age: 56
Discharge: HOME | End: 2021-10-28
Admitting: INTERNAL MEDICINE

## 2021-10-28 ENCOUNTER — TRANSCRIBE ORDERS (OUTPATIENT)
Dept: CARDIOLOGY | Facility: CLINIC | Age: 56
End: 2021-10-28

## 2021-10-28 DIAGNOSIS — R06.02 SHORTNESS OF BREATH: Primary | ICD-10-CM

## 2021-10-28 PROCEDURE — 82565 ASSAY OF CREATININE: CPT

## 2021-10-28 PROCEDURE — 71275 CT ANGIOGRAPHY CHEST: CPT

## 2021-10-28 PROCEDURE — 0 IOPAMIDOL PER 1 ML: Performed by: INTERNAL MEDICINE

## 2021-10-28 RX ADMIN — IOPAMIDOL 85 ML: 755 INJECTION, SOLUTION INTRAVENOUS at 16:31

## 2021-10-29 ENCOUNTER — TELEPHONE (OUTPATIENT)
Dept: CARDIOLOGY | Facility: CLINIC | Age: 56
End: 2021-10-29

## 2021-10-29 LAB — CREAT BLDA-MCNC: 0.8 MG/DL (ref 0.6–1.3)

## 2021-10-29 NOTE — TELEPHONE ENCOUNTER
The CTA was normal, we need a sleep test and also a bubble echo study . Pt had sleep study at [facility] in June she will bring in the report when she comes for this

## 2021-11-01 DIAGNOSIS — R06.02 SHORTNESS OF BREATH: Primary | ICD-10-CM

## 2021-11-29 ENCOUNTER — OFFICE VISIT (OUTPATIENT)
Dept: SLEEP MEDICINE | Facility: CLINIC | Age: 56
End: 2021-11-29

## 2021-11-29 DIAGNOSIS — E66.09 CLASS 2 OBESITY DUE TO EXCESS CALORIES WITHOUT SERIOUS COMORBIDITY WITH BODY MASS INDEX (BMI) OF 38.0 TO 38.9 IN ADULT: ICD-10-CM

## 2021-11-29 DIAGNOSIS — G47.33 OBSTRUCTIVE SLEEP APNEA, ADULT: Primary | ICD-10-CM

## 2021-11-29 PROCEDURE — 99203 OFFICE O/P NEW LOW 30 MIN: CPT | Performed by: INTERNAL MEDICINE

## 2021-11-30 VITALS
OXYGEN SATURATION: 99 % | HEIGHT: 67 IN | DIASTOLIC BLOOD PRESSURE: 100 MMHG | HEART RATE: 59 BPM | BODY MASS INDEX: 39.96 KG/M2 | WEIGHT: 254.6 LBS | SYSTOLIC BLOOD PRESSURE: 176 MMHG

## 2021-11-30 RX ORDER — CELECOXIB 200 MG/1
CAPSULE ORAL
COMMUNITY
Start: 2021-11-23 | End: 2022-03-11

## 2021-11-30 RX ORDER — CELECOXIB 100 MG/1
CAPSULE ORAL EVERY 12 HOURS SCHEDULED
COMMUNITY
End: 2021-11-30 | Stop reason: DRUGHIGH

## 2021-11-30 RX ORDER — LIRAGLUTIDE 6 MG/ML
INJECTION, SOLUTION SUBCUTANEOUS
COMMUNITY
Start: 2021-11-06 | End: 2023-01-23

## 2021-11-30 RX ORDER — CIPROFLOXACIN 500 MG/1
500 TABLET, FILM COATED ORAL 2 TIMES DAILY
COMMUNITY
Start: 2021-10-30 | End: 2021-11-30 | Stop reason: DRUGHIGH

## 2021-12-03 NOTE — PROGRESS NOTES
Chief Complaint  Snoring, Witnessed Apnea, Sleeping Problem, Daytime Sleepiness, Dry Mouth, Heartburn, and Unable To Fall Asleep    Subjective         Grace Jacob presents to [facility] evaluation of snoring and obstructive sleep apnea. She is referred by Dr. Lockhart. Her primary care provider is JAMAR Hinkle. She is seen in person in the sleep clinic.  History of Present Illness  Patient knee surgery in April and was noted at time to have low oxygen levels. She complains of feeling tired all the time and napping during the day. She had home sleep testing in June but did not get started on therapy. She says she has been noted to have snoring for at least 5 years. She denies any noted apneas. She denies awakening gasping for breath. She admits she is not rested on arising in the morning. She denies having morning headaches.    She will awaken with a dry mouth at night. She denies ever breaking her nose but does admit to having trouble breathing through her nose at night. She complains of sinus drainage. She denies any history of allergies. She is sleepy during the day. She denies problems while driving. She denies kicking or jerking her legs at night.    She has bed at 10:30 PM. She will fall asleep in 30 minutes. She awakens twice during the night. She thinks she gets 8 hours of sleep but is not rested. She naps almost every day for about 2 hours. She denies any history of hypertension, diabetes, or coronary artery disease. She does history of valvular heart disease.    Allergies: She is allergic to penicillin habits: Smoking: Without    Alcohol: She has 1-2 drinks 2-4 times a month    Caffeine: She has 1 cup of coffee 2-3 servings of tea and may have a decaf cola.    Medical illnesses: She has arthritis she has had hypertension she had bilateral knee replacement    Medications:DULoxetine (CYMBALTA) 60 MG capsule    fluticasone (FLONASE) 50 MCG/ACT nasal  "spray    Glucosamine HCl (GLUCOSAMINE PO)    naproxen (NAPROSYN) 500 MG tablet    ondansetron (ZOFRAN) 4 MG tablet    solifenacin (VESICARE) 10 MG tablet    traMADol (ULTRAM) 50 MG tablet    valACYclovir (VALTREX) 500 MG tablet    celecoxib (CeleBREX) 200 MG capsule    Saxenda 18 MG/3ML injection pen    Surgeries: She has had wisdom teeth extraction and total knee replacement on three occasions and also bilateral hip replacements    Family history is positive for diabetes and heart disease    Review of systems has positives fatigue, constipation, back pain, joint swelling. Other systems reviewed and negative.    [place] score 13/24    Objective   Vital Signs:   /100 (BP Location: Left arm, Patient Position: Sitting, Cuff Size: Adult)   Pulse 59   Ht 170.2 cm (67\")   Wt 115 kg (254 lb 9.6 oz)   SpO2 99%   BMI 39.88 kg/m²     Physical Exam patient appears to be awake and alert. She does not appear to be in acute respiratory distress.    She is normocephalic. She has Mallampati class I anatomy.    Lungs are clear.    Cardiac exam revealed normal S1-S2    Extremities showed trace pedal edema.  Result Review :    Patient had home sleep testing on June 9, 2021. She had an AHI of 7.7. This is consistent with mild obstructive sleep apnea.     Assessment and Plan    Diagnoses and all orders for this visit:    1. Obstructive sleep apnea, adult (Primary)  -     Detailed AutoPAP Order    2. Class 2 obesity due to excess calories without serious comorbidity with body mass index (BMI) of 38.0 to 38.9 in adult  -     Ambulatory Referral to Nutrition Services    Patient does have mild obstructive sleep apnea. We discussed potential therapies including CPAP, weight control, oral appliance, and surgery. We have discussed the long-term consequences of untreated obstructive sleep apnea. These include hypertension, diabetes, heart disease, stroke, and dementia. She is encouraged to lose weight. She is interested in having a " discussion with the dietitian about losing weight. She is encouraged avoid alcohol and sedatives close to bedtime. She is encouraged to practice lateral position sleep. After discussion she is willing to try CPAP. We will place orders for CPAP and see her back in 2 months. We can then download the machine and be certain we are correcting her respiratory events.  I spent 35 minutes caring for Grace on this date of service. This time includes time spent by me in the following activities:reviewing tests, obtaining and/or reviewing a separately obtained history, performing a medically appropriate examination and/or evaluation , counseling and educating the patient/family/caregiver, ordering medications, tests, or procedures and documenting information in the medical record  Follow Up   Return in about 2 months (around 1/29/2022) for 31 to 90 days after PAP setup, Next scheduled follow-up.  Patient was given instructions and counseling regarding her condition or for health maintenance advice. Please see specific information pulled into the AVS if appropriate.     Brayan Mitchell MD [facility]  Sleep Medicine  Pulmonary and Critical Care Medicine

## 2021-12-06 ENCOUNTER — TELEPHONE (OUTPATIENT)
Dept: CARDIOLOGY | Facility: CLINIC | Age: 56
End: 2021-12-06

## 2021-12-06 ENCOUNTER — HOSPITAL ENCOUNTER (OUTPATIENT)
Dept: NUTRITION | Facility: HOSPITAL | Age: 56
Setting detail: RECURRING SERIES
Discharge: HOME | End: 2021-12-06

## 2021-12-06 PROCEDURE — 97802 MEDICAL NUTRITION INDIV IN: CPT

## 2021-12-06 NOTE — TELEPHONE ENCOUNTER
Called pt and let her know that she needed to see a GI doctor, and that she needed to come back here if she develops any other problems.

## 2021-12-06 NOTE — TELEPHONE ENCOUNTER
Please take a look over this patient, she wants to know if she should see a GI doctor since she is still having the chest pain

## 2021-12-06 NOTE — CONSULTS
"Adult Outpatient Nutrition  Assessment/PES    Patient Name: Grace Jacob  YOB: 1965  MRN: [id number]      Assessment Date: 12/06/21      This medical referred consult was provided via telehealth as patient was unable to attend an in-office appointment today due to the COVID-19 crisis. Consent for treatment was given verbally. RD spent a total of 60 minutes with patient today.      Reason for visit:     Patient is a 56 y.o. female who is referred today for nutrition counseling r/t  Healthy weight management.     Session Details:     Attendees: patient  Language/communication details: english is preferred language  Barriers to learning: none  Details of home: lives with     Anthropometrics:     Ht Readings from Last 1 Encounters:   11/30/21 170.2 cm (67\")      Wt Readings from Last 1 Encounters:   11/30/21 115 kg (254 lb 9.6 oz)      BMI Readings from Last 1 Encounters:   11/30/21 39.88 kg/m²      Ideal body weight: 61.6 kg (135 lb 12.9 oz)  Adjusted ideal body weight: 83.2 kg (183 lb 5.2 oz)       Recent weight change:     Saw weight loss of ~40# taking phentermine, gained 20# back when they came off the medication.    Medical History:  Sleep apnea  Cholecystectomy (~30 years)   3 knee replacements, 2 hip replacements   Has been diagnosed with IBS several years ago    Pertinent Labs:     Labs reviewed    Pertinent Medications/Supplements:      Current Outpatient Medications:   •  celecoxib (CeleBREX) 200 MG capsule, , Disp: , Rfl:   •  DULoxetine (CYMBALTA) 60 MG capsule, Take 60 mg by mouth Every Night., Disp: , Rfl:   •  fluticasone (FLONASE) 50 MCG/ACT nasal spray, , Disp: , Rfl:   •  Glucosamine HCl (GLUCOSAMINE PO), Take 2 tablets by mouth Daily., Disp: , Rfl:   •  naproxen (NAPROSYN) 500 MG tablet, Take 500 mg by mouth 2 (Two) Times a Day With Meals., Disp: , Rfl:   •  ondansetron (ZOFRAN) 4 MG tablet, Take 1 tablet by mouth Every 6 (Six) Hours As Needed for Nausea or Vomiting., " "Disp: 30 tablet, Rfl: 0  •  Saxenda 18 MG/3ML injection pen, , Disp: , Rfl:   •  solifenacin (VESICARE) 10 MG tablet, Take 10 mg by mouth Daily., Disp: , Rfl:   •  traMADol (ULTRAM) 50 MG tablet, Take 50 mg by mouth Every 6 (Six) Hours As Needed., Disp: , Rfl:   •  valACYclovir (VALTREX) 500 MG tablet, TAKE 1 TABLET BY MOUTH DAILY, Disp: 30 tablet, Rfl: 8    Vitamin D3, zinc, B12, Vitamin C    Nutrition Assessment:     Who prepares most meals: self  Who does grocery shopping: self   How many meals purchased from fast food/sit down restaurants per week: 5+x/week  Food insecurity: none  Food allergies/intolerances/aversions: none  Difficulty chewing: none  Difficulty swallowing: occasionally, swelling of LES   Gastrointestinal symptoms that impact intake or food choices: nausea after eating  Diet requirements related to personal preference or cultural belief: none  History of eating disorder/disordered eating habits: none    Diet recall:   Time Food/beverages consumed Quantity    8am Skipped or 1-2 cups of coffee or green tea    12pm 1/2 burger and 5 french fries    6pm 1/2 grilled chicken breast and 1/2 salad                          Nutrition assessment comments:   Skips breakfast d/t lack of appetite. Rarely eating snacks. Has been eating 2 meals \"for a while\".     Physical activity:    Activity or exercise program: sedentary at work, doing physical therapy 2x/week  Frequency of exercise:   Duration of exercise:     Assessed Needs:     Estimated energy needs: 1039-4554 using Jack St. Jeor and an activity factor of 1.35  Estimated protein needs: 85 g/day       PES Statement:     BMI of 39.88 related to excess caloric intake as evidenced by patient report of frequent meals at restaurants/fast-food.    Intervention Goal(s):     1. Drink 3-4 bottles of water/day  2. Use a calorie counting james with a goal to consume 4498-3865 kcals/day.     Interventions/Recommendations:      During session, patient discussed new " symptoms of upper abdominal pain sometimes resulting in nausea/diarrhe. Patient has an upcoming appointment with gastroenterologist. Patient states that weight loss is their primary goal at this time. RD discussed benefit of eating consistent meals/snacks including 3 small meals/day and 1-2 snacks. Patient states they have been skipping breakfast as a way of practicing intermittent fasting for a while with no weight loss. RD discussed principles of planning balanced meals and snacks with an emphasis on adequate protein, vegetables, and whole grain starches. RD discussed keeping a food journal to ensure adequate and consistent caloric intake, RD provided rationale for calorie goal of 4919-2944 kcals/day. Patient requested information on KETO, RD did not encourage this diet d/t hx of cholecystectomy. Patient identified a need to increase water intake, RD encouraged patient to gradually increase water over time with a general recommendation of ~64 oz of water/day based on the recommendation of 1 mL/kcal/day. Patient was actively engaged in the session and was receptive to information.      Resources Provided:   [facility] Healthy Snack Resource        Total of 60 minutes spent with patient on nutrition counseling. Education based on Academy of Nutrition and Dietetics guidelines. Patient was provided with RD's contact information. Follow up visit is scheduled for 1/3. Thank you for this referral.      Electronically signed by:  Amy Toussaint RD  12/06/21 13:26 EST

## 2021-12-06 NOTE — TELEPHONE ENCOUNTER
She wants to know what the next phase of treatment  Is      Her bp had been high and still has the pain should she see gastro     Is she suppose to come back to see us???

## 2021-12-16 ENCOUNTER — TELEPHONE (OUTPATIENT)
Dept: OBSTETRICS AND GYNECOLOGY | Facility: CLINIC | Age: 56
End: 2021-12-16

## 2021-12-16 NOTE — TELEPHONE ENCOUNTER
Patient called and LVM; reports that she has just started period-like bleeding for the first time in 7+ years. Would like to s/w nurse for further advisement.

## 2021-12-23 ENCOUNTER — OFFICE VISIT (OUTPATIENT)
Dept: OBSTETRICS AND GYNECOLOGY | Facility: CLINIC | Age: 56
End: 2021-12-23

## 2021-12-23 VITALS
WEIGHT: 243.6 LBS | BODY MASS INDEX: 38.24 KG/M2 | DIASTOLIC BLOOD PRESSURE: 62 MMHG | SYSTOLIC BLOOD PRESSURE: 124 MMHG | HEIGHT: 67 IN

## 2021-12-23 DIAGNOSIS — B37.2 YEAST DERMATITIS: ICD-10-CM

## 2021-12-23 DIAGNOSIS — N95.0 PMB (POSTMENOPAUSAL BLEEDING): Primary | ICD-10-CM

## 2021-12-23 PROCEDURE — 99212 OFFICE O/P EST SF 10 MIN: CPT | Performed by: OBSTETRICS & GYNECOLOGY

## 2021-12-23 RX ORDER — FLUCONAZOLE 150 MG/1
TABLET ORAL
Qty: 2 TABLET | Refills: 2 | Status: SHIPPED | OUTPATIENT
Start: 2021-12-23 | End: 2022-07-06

## 2021-12-23 RX ORDER — CLARITHROMYCIN 500 MG/1
500 TABLET, COATED ORAL 2 TIMES DAILY
COMMUNITY
Start: 2021-12-02 | End: 2022-03-11

## 2021-12-23 RX ORDER — METOPROLOL SUCCINATE 25 MG/1
TABLET, EXTENDED RELEASE ORAL
COMMUNITY
Start: 2021-12-06 | End: 2022-07-06

## 2021-12-23 NOTE — PROGRESS NOTES
Gynecologic Exam Note      Chief Complaint   Patient presents with   • Follow-up     PMPB       CC: PMB    Subjective   HPI  Grace Jacob is a 56 y.o. female, , who presents with initial evaluation of Postmenopausal Bleeding.    She states she has experienced this problem for 3 duration: days.  She describes the severity as moderate.  She states that the problem is resolving .  The patient uses 3 of pads per day.  She describes the bleeding as flow about like a period.  The patient reports additional symptoms of cramping, headache.  She reports moderate pelvic pain.  The patient has  been evaluated:  with TVUS, performed today, result nl.      Her last LMP was No LMP recorded. Patient is postmenopausal..      She does not use hormone replacement therapy.   There have not been any recent changes to her HRT.  She has had any recent steroids, she gets a steroid patch in her knee.  She has not had any recent blood thinners.     Additional OB/GYN History   Last Pap : 2021 Normal   Last Completed Pap Smear          PAP SMEAR (Every 3 Years) Next due on 3/12/2024    2021  Pap IG, Rfx HPV ASCU    2020  Done - negative              History of abnormal Pap smear: no  Tobacco Usage?: No     The additional following portions of the patient's history were reviewed and updated as appropriate: allergies, current medications, past family history, past medical history, past social history, past surgical history and problem list.    Past Medical History:   Diagnosis Date   • Anxiety    • Arthritis    • Asthma    • Chronic pain     BILATERAL KNEE   • Frequent UTI    • Gallbladder problem    • Herpes    • IBS (irritable bowel syndrome)    • Left knee pain    • PONV (postoperative nausea and vomiting)    • Pregnancy    • Urinary incontinence        Past Surgical History:   Procedure Laterality Date   • GALLBLADDER SURGERY     • HIP ARTHROPLASTY Left    • LAPAROSCOPIC CHOLECYSTECTOMY     •  "LIPOMA EXCISION     • TOTAL HIP ARTHROPLASTY Right 10/19/2018    Procedure: RIGHT TOTAL  ANTERIOR HIP ARTHROPLASTY;  Surgeon: Mary Be MD;  Location: [facility];  Service: Orthopedics   • TOTAL KNEE ARTHROPLASTY      bilateral   • TOTAL KNEE ARTHROPLASTY REVISION Left 4/6/2021    Procedure: LEFT KNEE REVISION;  Surgeon: Mary Be MD;  Location: [facility];  Service: Orthopedics;  Laterality: Left;         Review of Systems    I have reviewed and agree with the HPI, ROS, and historical information as entered above. Redd Cavanaugh MD    Objective   /62   Ht 170.2 cm (67\")   Wt 110 kg (243 lb 9.6 oz)   BMI 38.15 kg/m²         Did the patient have u/s today? Yes.  Findings showed nl.  I have personally evaluated the U/S and agree with the findings. Redd Cavanaugh MD    Assessment/Plan     Assessment     Problem List Items Addressed This Visit     None      Visit Diagnoses     PMB (postmenopausal bleeding)    -  Primary    Relevant Orders    US Non-ob Transvaginal (Completed)    Yeast dermatitis        Relevant Medications    fluconazole (Diflucan) 150 MG tablet            Plan     1. Return PRN  2. will not do bx as endo lining 2.1 but follow if returns    3. Will try vaseline and Diflucan   4.       Redd Cavanaugh MD  12/23/2021  "

## 2022-01-03 ENCOUNTER — HOSPITAL ENCOUNTER (OUTPATIENT)
Dept: NUTRITION | Facility: HOSPITAL | Age: 57
Setting detail: RECURRING SERIES
Discharge: HOME | End: 2022-01-03

## 2022-01-03 NOTE — PROGRESS NOTES
Adult Outpatient Nutrition  Assessment    Patient Name:  Grace Jacob  YOB: 1965  MRN: [id number]    Assessment Date:  1/3/2022    Telephone nutrition consult, 60 minutes. This medical referred consult was provided as a telephone call, tele-health or e-visit, as patient is unable to attend an in-office appointment due to the COVID-19 crisis. Consent for treatment was given verbally.    Comments:      Patient seen today for follow-up appointment for nutrition counseling r/t healthy weight management. Patient describes frequently experiencing n/v after meals over the past few weeks. Patient has been seen by GI and received an endoscopy but has not received information about any additional findings other than being diagnosed with gastritis. RD encouraged patient to follow a low-residue diet in order to promote gut rest and healing. RD discussed principles of a low residue diet including fully cooked vegetables and meats, low fiber grains, and limited consumption of raw fruits and vegetables. Patient was receptive to information and is willing to try following diet to gain relief. RD encouraged patient to follow low-residue diet for at least 1 week and monitor symptoms. RD scheduled patient for a follow-up with our office's GI specialist dietitian. Patient was not able to make nutrition-related changes to promote weight loss d/t food causing negative symptoms at this time.     Goals progress:   1. Drink 3-4 bottles of water/day - unable to complete at this time  2. Use a calorie counting james with a goal to consume 0503-9784 kcals/day. - unable to complete at this time    Follow-up appointment scheduled for 2/03. Thank you for this referral.     Electronically signed by:  Amy Toussaint RD  01/03/22 15:50 EST

## 2022-02-03 ENCOUNTER — APPOINTMENT (OUTPATIENT)
Dept: NUTRITION | Facility: HOSPITAL | Age: 57
End: 2022-02-03

## 2022-03-11 ENCOUNTER — TELEMEDICINE (OUTPATIENT)
Dept: SLEEP MEDICINE | Facility: HOSPITAL | Age: 57
End: 2022-03-11

## 2022-03-11 VITALS — BODY MASS INDEX: 38.45 KG/M2 | WEIGHT: 245 LBS | HEIGHT: 67 IN

## 2022-03-11 DIAGNOSIS — G47.33 OBSTRUCTIVE SLEEP APNEA, ADULT: Primary | ICD-10-CM

## 2022-03-11 DIAGNOSIS — G47.34 NOCTURNAL HYPOXEMIA: ICD-10-CM

## 2022-03-11 PROCEDURE — 99213 OFFICE O/P EST LOW 20 MIN: CPT | Performed by: NURSE PRACTITIONER

## 2022-03-11 NOTE — PROGRESS NOTES
Chief Complaint:   Chief Complaint   Patient presents with   • Follow-up       HPI:    Grace Jacob is a 56 y.o. female here for follow-up of sleep apnea.  Patient was last seen 11/29/2021 and did wish to start CPAP therapy due to low oxygen noticed on the surgery, snoring x5 years, nonrestorative sleep.  She originally had her testing done in June 2021 showed mild obstructive sleep apnea with low oxygen levels of 85%.  She continued to feel bad in November did start CPAP.  Patient states she is doing very well and feels a lot more rested than before.  She has no longer tossing and turning, she is dreaming, and feeling very rested.  She sleeps 7 to 8 hours nightly and does get up x2 to use the restroom.  Patient does go to sleep easily within 10 to 15 minutes.  She now has an [place] score of 1/24.  Patient has no concerns or complaints and will continue with CPAP therapy.        Current medications are:   Current Outpatient Medications:   •  DULoxetine (CYMBALTA) 60 MG capsule, Take 60 mg by mouth Every Night., Disp: , Rfl:   •  fluconazole (Diflucan) 150 MG tablet, 1 po now repeat 1 week, Disp: 2 tablet, Rfl: 2  •  fluticasone (FLONASE) 50 MCG/ACT nasal spray, , Disp: , Rfl:   •  Glucosamine HCl (GLUCOSAMINE PO), Take 2 tablets by mouth Daily., Disp: , Rfl:   •  metoprolol succinate XL (TOPROL-XL) 25 MG 24 hr tablet, take 1 tablet by oral route  every day for blood pressure, Disp: , Rfl:   •  naproxen (NAPROSYN) 500 MG tablet, Take 500 mg by mouth 2 (Two) Times a Day With Meals., Disp: , Rfl:   •  Saxenda 18 MG/3ML injection pen, , Disp: , Rfl:   •  valACYclovir (VALTREX) 500 MG tablet, TAKE 1 TABLET BY MOUTH DAILY, Disp: 30 tablet, Rfl: 8.      The patient's relevant past medical, surgical, family and social history were reviewed and updated in Epic as appropriate.       Review of Systems   HENT: Positive for congestion.    Eyes: Positive for visual disturbance.   Respiratory: Positive for apnea and  shortness of breath.    Musculoskeletal: Positive for arthralgias.   Allergic/Immunologic: Positive for environmental allergies.   Psychiatric/Behavioral: Positive for sleep disturbance. The patient is nervous/anxious.    All other systems reviewed and are negative.        Objective:    Physical Exam  Constitutional:       Appearance: Normal appearance.   HENT:      Head: Normocephalic and atraumatic.      Mouth/Throat:      Comments: Mallampati 1 anatomy  Pulmonary:      Effort: Pulmonary effort is normal. No respiratory distress.   Neurological:      Mental Status: She is alert and oriented to person, place, and time.   Psychiatric:         Mood and Affect: Mood normal.         Behavior: Behavior normal.         Thought Content: Thought content normal.         Judgment: Judgment normal.       30/30 days of use  Greater than 4-hour use 100%  95th percentile pressure 10.5  AHI of 0.5  Settings 8-18    ASSESSMENT/PLAN    Diagnoses and all orders for this visit:    1. Obstructive sleep apnea, adult (Primary)  -     Overnight Sleep Oximetry Study; Future  -     PAP Therapy    2. Nocturnal hypoxemia  -     Overnight Sleep Oximetry Study; Future            1. Counseled patient regarding multimodal approach with healthy nutrition, healthy sleep, regular physical activity, social activities, counseling, and medications. Encouraged to practice lateral sleep position. Avoid alcohol and sedatives close to bedtime.  2.   Refill supplies x1 year.  Return to clinic 1 year or sooner symptoms warrant.  Overnight oximetry to be done while wearing CPAP and patient will be called with these results.  Patient did give verbal consent for video visit.  I have reviewed the results of my evaluation and impression and discussed my recommendations in detail with the patient.      Signed by  TING Trejo    March 11, 2022      CC: Tiffanie Dickson PA-C          No ref. provider found

## 2022-03-14 ENCOUNTER — TELEMEDICINE (OUTPATIENT)
Dept: FAMILY MEDICINE CLINIC | Facility: TELEHEALTH | Age: 57
End: 2022-03-14

## 2022-03-14 DIAGNOSIS — R39.89 SUSPECTED UTI: Primary | ICD-10-CM

## 2022-03-14 PROCEDURE — 99213 OFFICE O/P EST LOW 20 MIN: CPT | Performed by: NURSE PRACTITIONER

## 2022-03-14 RX ORDER — NITROFURANTOIN 25; 75 MG/1; MG/1
100 CAPSULE ORAL 2 TIMES DAILY
Qty: 14 CAPSULE | Refills: 0 | Status: SHIPPED | OUTPATIENT
Start: 2022-03-14 | End: 2022-03-21

## 2022-03-14 RX ORDER — PHENAZOPYRIDINE HYDROCHLORIDE 200 MG/1
200 TABLET, FILM COATED ORAL 3 TIMES DAILY PRN
Qty: 6 TABLET | Refills: 0 | Status: SHIPPED | OUTPATIENT
Start: 2022-03-14 | End: 2022-03-16

## 2022-03-14 NOTE — PROGRESS NOTES
You have chosen to receive care through a telehealth visit.  Do you consent to use a video/audio connection for your medical care today? Yes     CHIEF COMPLAINT  No chief complaint on file.        HPI  Grace Jacob is a 56 y.o. female  presents with complaint of dysuria, frequency urgency, small amounts of urine, pressure in bladder area.  Denies fever, n/v, chills, hematuria or vaginal symptoms.  She has a history of UTIs, last one in December.     Review of Systems   Constitutional: Negative for fever.   Gastrointestinal: Negative for abdominal pain.   Genitourinary: Positive for dysuria, frequency and urgency. Negative for flank pain and hematuria.   Musculoskeletal: Negative for back pain.   All other systems reviewed and are negative.      Past Medical History:   Diagnosis Date   • Anxiety    • Arthritis    • Asthma    • Chronic pain     BILATERAL KNEE   • Frequent UTI    • Gallbladder problem    • Herpes    • IBS (irritable bowel syndrome)    • Left knee pain    • PONV (postoperative nausea and vomiting)    • Pregnancy    • Urinary incontinence        Family History   Problem Relation Age of Onset   • Alzheimer's disease Mother    • Diabetes Father    • Coronary artery disease Father    • Heart disease Father    • Cancer Brother    • Depression Brother    • Diabetes Brother    • BRCA 1/2 Neg Hx    • Breast cancer Neg Hx    • Colon cancer Neg Hx    • Endometrial cancer Neg Hx    • Ovarian cancer Neg Hx    • Malig Hyperthermia Neg Hx    • Uterine cancer Neg Hx        Social History     Socioeconomic History   • Marital status:    Tobacco Use   • Smoking status: Never Smoker   • Smokeless tobacco: Never Used   Vaping Use   • Vaping Use: Never used   Substance and Sexual Activity   • Alcohol use: No   • Drug use: No   • Sexual activity: Defer         There were no vitals taken for this visit.    PHYSICAL EXAM  Physical Exam   Constitutional: She is oriented to person, place, and time. She appears  well-developed and well-nourished. She does not have a sickly appearance. She does not appear ill.   HENT:   Head: Normocephalic and atraumatic.   Pulmonary/Chest: Effort normal.  No respiratory distress.  Neurological: She is alert and oriented to person, place, and time.       Results for orders placed or performed during the hospital encounter of 10/28/21   POC Creatinine    Specimen: Blood   Result Value Ref Range    Creatinine 0.80 0.60 - 1.30 mg/dL       Diagnoses and all orders for this visit:    1. Suspected UTI (Primary)  -     nitrofurantoin, macrocrystal-monohydrate, (MACROBID) 100 MG capsule; Take 1 capsule by mouth 2 (Two) Times a Day for 7 days.  Dispense: 14 capsule; Refill: 0  -     phenazopyridine (PYRIDIUM) 200 MG tablet; Take 1 tablet by mouth 3 (Three) Times a Day As Needed for Bladder Spasms for up to 2 days.  Dispense: 6 tablet; Refill: 0    -Macrobid as prescribed - complete entire course of medication even if you begin to feel better.   --Phenazopyridine is for painful urination and bladder spasms--this medication with cause urine to become bright orange and can stain undergarments.    -Continue to increase your fluid intake.   -Abstain from intercourse during antibiotic treatment.   -Practice good perineal hygiene: wipe front to back  -Do not hold your urine- go to the bathroom every 2-3 hours.     -Warning signs: severe abdominal/pelvic/back pain, fever >101, blood in urine - seek medical attention as soon as possible for a hands on/objective exam and possible labs.     -Follow up with your PCP in 2 days if no improvement in symptoms or if symptoms begin to worsen.         FOLLOW-UP  As discussed during visit with PCP/[facility] if no improvement or Urgent Care/Emergency Department if worsening of symptoms    Patient verbalizes understanding of medication dosage, comfort measures, instructions for treatment and follow-up.    Sasha Meléndez, TING  03/14/2022  12:10 EDT    This visit was  performed via Telehealth.  This patient has been instructed to follow-up with their primary care provider if their symptoms worsen or the treatment provided does not resolve their illness.

## 2022-03-14 NOTE — PATIENT INSTRUCTIONS
Urinary Tract Infection, Adult    A urinary tract infection (UTI) is an infection of any part of the urinary tract. The urinary tract includes the kidneys, ureters, bladder, and urethra. These organs make, store, and get rid of urine in the body.  Your health care provider may use other names to describe the infection. An upper UTI affects the ureters and kidneys (pyelonephritis). A lower UTI affects the bladder (cystitis) and urethra (urethritis).  What are the causes?  Most urinary tract infections are caused by bacteria in your genital area, around the entrance to your urinary tract (urethra). These bacteria grow and cause inflammation of your urinary tract.  What increases the risk?  You are more likely to develop this condition if:  You have a urinary catheter that stays in place (indwelling).  You are not able to control when you urinate or have a bowel movement (you have incontinence).  You are female and you:  Use a spermicide or diaphragm for birth control.  Have low estrogen levels.  Are pregnant.  You have certain genes that increase your risk (genetics).  You are sexually active.  You take antibiotic medicines.  You have a condition that causes your flow of urine to slow down, such as:  An enlarged prostate, if you are male.  Blockage in your urethra (stricture).  A kidney stone.  A nerve condition that affects your bladder control (neurogenic bladder).  Not getting enough to drink, or not urinating often.  You have certain medical conditions, such as:  Diabetes.  A weak disease-fighting system (immunesystem).  Sickle cell disease.  Gout.  Spinal cord injury.  What are the signs or symptoms?  Symptoms of this condition include:  Needing to urinate right away (urgently).  Frequent urination or passing small amounts of urine frequently.  Pain or burning with urination.  Blood in the urine.  Urine that smells bad or unusual.  Trouble urinating.  Cloudy urine.  Vaginal discharge, if you are female.  Pain in  the abdomen or the lower back.  You may also have:  Vomiting or a decreased appetite.  Confusion.  Irritability or tiredness.  A fever.  Diarrhea.  The first symptom in older adults may be confusion. In some cases, they may not have any symptoms until the infection has worsened.  How is this diagnosed?  This condition is diagnosed based on your medical history and a physical exam. You may also have other tests, including:  Urine tests.  Blood tests.  Tests for sexually transmitted infections (STIs).  If you have had more than one UTI, a cystoscopy or imaging studies may be done to determine the cause of the infections.  How is this treated?  Treatment for this condition includes:  Antibiotic medicine.  Over-the-counter medicines to treat discomfort.  Drinking enough water to stay hydrated.  If you have frequent infections or have other conditions such as a kidney stone, you may need to see a health care provider who specializes in the urinary tract (urologist).  In rare cases, urinary tract infections can cause sepsis. Sepsis is a life-threatening condition that occurs when the body responds to an infection. Sepsis is treated in the hospital with IV antibiotics, fluids, and other medicines.  Follow these instructions at home:    Medicines  Take over-the-counter and prescription medicines only as told by your health care provider.  If you were prescribed an antibiotic medicine, take it as told by your health care provider. Do not stop using the antibiotic even if you start to feel better.  General instructions  Make sure you:  Empty your bladder often and completely. Do not hold urine for long periods of time.  Empty your bladder after sex.  Wipe from front to back after a bowel movement if you are female. Use each tissue one time when you wipe.  Drink enough fluid to keep your urine pale yellow.  Keep all follow-up visits as told by your health care provider. This is important.  Contact a health care provider  if:  Your symptoms do not get better after 1-2 days.  Your symptoms go away and then return.  Get help right away if you have:  Severe pain in your back or your lower abdomen.  A fever.  Nausea or vomiting.  Summary  A urinary tract infection (UTI) is an infection of any part of the urinary tract, which includes the kidneys, ureters, bladder, and urethra.  Most urinary tract infections are caused by bacteria in your genital area, around the entrance to your urinary tract (urethra).  Treatment for this condition often includes antibiotic medicines.  If you were prescribed an antibiotic medicine, take it as told by your health care provider. Do not stop using the antibiotic even if you start to feel better.  Keep all follow-up visits as told by your health care provider. This is important.  This information is not intended to replace advice given to you by your health care provider. Make sure you discuss any questions you have with your health care provider.  Document Revised: 12/05/2019 Document Reviewed: 06/27/2019  [employer] Patient Education © 2021 [employer]

## 2022-03-18 DIAGNOSIS — G47.34 NOCTURNAL HYPOXEMIA: ICD-10-CM

## 2022-03-18 DIAGNOSIS — G47.33 OBSTRUCTIVE SLEEP APNEA, ADULT: ICD-10-CM

## 2022-03-29 ENCOUNTER — TELEPHONE (OUTPATIENT)
Dept: SLEEP MEDICINE | Facility: HOSPITAL | Age: 57
End: 2022-03-29

## 2022-04-01 NOTE — TELEPHONE ENCOUNTER
Incoming Refill Request      Medication requested (name and dose):     SAXENDA 6MG/ML PEN INJ 3ML    Pharmacy where request should be sent:     [facility]    Additional details provided by patient:     2ND REFILL REQUEST    Best call back number:     [phone]    Does the patient have less than a 3 day supply:  [] Yes  [x] No    Nina Hastings  04/01/22, 08:50 EDT

## 2022-04-07 NOTE — PLAN OF CARE
Problem: Patient Care Overview  Goal: Plan of Care Review  Outcome: Ongoing (interventions implemented as appropriate)   10/19/18 1735   Coping/Psychosocial   Plan of Care Reviewed With patient   OTHER   Outcome Summary Postop anterior RTHA. Vitals stable. Pain controlled. Pt c/o severe nausea/vomiting unrelieved with zofran and limiting PT/ambulation. Order obtained for phenergan, administered x1 with relief. Patient now drowsy but arousable. Voiding without difficulty per BSC. Hx chronic back pain, requires frequent repositioning to relieve. Plans for home with HH upon DC.   Plan of Care Review   Progress improving     Goal: Individualization and Mutuality  Outcome: Ongoing (interventions implemented as appropriate)    Goal: Discharge Needs Assessment  Outcome: Ongoing (interventions implemented as appropriate)    Goal: Interprofessional Rounds/Family Conf  Outcome: Ongoing (interventions implemented as appropriate)      Problem: Fall Risk (Adult)  Goal: Identify Related Risk Factors and Signs and Symptoms  Outcome: Outcome(s) achieved Date Met: 10/19/18   10/19/18 1735   Fall Risk (Adult)   Signs and Symptoms (Fall Risk) presence of risk factors     Goal: Absence of Fall  Outcome: Ongoing (interventions implemented as appropriate)   10/19/18 1735   Fall Risk (Adult)   Absence of Fall achieves outcome       Problem: Hip Arthroplasty (Total, Partial) (Adult)  Goal: Signs and Symptoms of Listed Potential Problems Will be Absent, Minimized or Managed (Hip Arthroplasty)  Outcome: Ongoing (interventions implemented as appropriate)   10/19/18 1735   Goal/Outcome Evaluation   Problems Assessed (Hip Arthroplasty) all   Problems Present (Hip Arthroplasty) pain;postoperative nausea and vomiting     Goal: Anesthesia/Sedation Recovery  Outcome: Ongoing (interventions implemented as appropriate)   10/19/18 1735   Goal/Outcome Evaluation   Anesthesia/Sedation Recovery progressing toward baseline          Yes past 3 months

## 2022-05-19 RX ORDER — VALACYCLOVIR HYDROCHLORIDE 500 MG/1
TABLET, FILM COATED ORAL
Qty: 30 TABLET | Refills: 8 | Status: SHIPPED | OUTPATIENT
Start: 2022-05-19 | End: 2023-02-10 | Stop reason: SDUPTHER

## 2022-06-29 ENCOUNTER — OFFICE VISIT (OUTPATIENT)
Dept: BARIATRICS/WEIGHT MGMT | Facility: CLINIC | Age: 57
End: 2022-06-29

## 2022-06-29 ENCOUNTER — TELEPHONE (OUTPATIENT)
Dept: FAMILY MEDICINE CLINIC | Facility: CLINIC | Age: 57
End: 2022-06-29

## 2022-06-29 ENCOUNTER — OFFICE VISIT (OUTPATIENT)
Dept: BEHAVIORAL HEALTH | Facility: CLINIC | Age: 57
End: 2022-06-29

## 2022-06-29 ENCOUNTER — DOCUMENTATION (OUTPATIENT)
Dept: BARIATRICS/WEIGHT MGMT | Facility: CLINIC | Age: 57
End: 2022-06-29

## 2022-06-29 VITALS
DIASTOLIC BLOOD PRESSURE: 78 MMHG | WEIGHT: 254 LBS | HEIGHT: 67 IN | OXYGEN SATURATION: 97 % | HEART RATE: 78 BPM | TEMPERATURE: 97.3 F | SYSTOLIC BLOOD PRESSURE: 132 MMHG | BODY MASS INDEX: 39.87 KG/M2

## 2022-06-29 DIAGNOSIS — G47.33 OSA ON CPAP: ICD-10-CM

## 2022-06-29 DIAGNOSIS — F32.9 REACTIVE DEPRESSION: ICD-10-CM

## 2022-06-29 DIAGNOSIS — Z99.89 OSA ON CPAP: ICD-10-CM

## 2022-06-29 DIAGNOSIS — E66.9 OBESITY, CLASS II, BMI 35-39.9: ICD-10-CM

## 2022-06-29 DIAGNOSIS — R53.83 FATIGUE, UNSPECIFIED TYPE: ICD-10-CM

## 2022-06-29 DIAGNOSIS — I10 HYPERTENSION, UNSPECIFIED TYPE: Primary | ICD-10-CM

## 2022-06-29 DIAGNOSIS — R12 HEARTBURN: ICD-10-CM

## 2022-06-29 DIAGNOSIS — Z71.89 ENCOUNTER FOR PSYCHOLOGICAL ASSESSMENT PRIOR TO BARIATRIC SURGERY: ICD-10-CM

## 2022-06-29 DIAGNOSIS — E66.9 OBESITY (BMI 30-39.9): Primary | ICD-10-CM

## 2022-06-29 DIAGNOSIS — G89.29 OTHER CHRONIC PAIN: ICD-10-CM

## 2022-06-29 PROBLEM — Z90.49 S/P LAPAROSCOPIC CHOLECYSTECTOMY: Status: ACTIVE | Noted: 2022-06-29

## 2022-06-29 PROBLEM — M51.369 DDD (DEGENERATIVE DISC DISEASE), LUMBAR: Status: ACTIVE | Noted: 2022-06-29

## 2022-06-29 PROBLEM — M51.36 DDD (DEGENERATIVE DISC DISEASE), LUMBAR: Status: ACTIVE | Noted: 2022-06-29

## 2022-06-29 PROBLEM — R13.10 DYSPHAGIA: Status: ACTIVE | Noted: 2022-06-29

## 2022-06-29 PROBLEM — M19.90 ARTHRITIS: Status: ACTIVE | Noted: 2022-06-29

## 2022-06-29 PROBLEM — R11.2 PONV (POSTOPERATIVE NAUSEA AND VOMITING): Status: ACTIVE | Noted: 2022-06-29

## 2022-06-29 PROBLEM — R32 URINARY INCONTINENCE: Status: ACTIVE | Noted: 2022-06-29

## 2022-06-29 PROBLEM — Z98.890 PONV (POSTOPERATIVE NAUSEA AND VOMITING): Status: ACTIVE | Noted: 2022-06-29

## 2022-06-29 PROBLEM — K58.9 IBS (IRRITABLE BOWEL SYNDROME): Status: ACTIVE | Noted: 2022-06-29

## 2022-06-29 PROBLEM — N39.0 FREQUENT UTI: Status: ACTIVE | Noted: 2022-06-29

## 2022-06-29 PROCEDURE — 99204 OFFICE O/P NEW MOD 45 MIN: CPT | Performed by: PHYSICIAN ASSISTANT

## 2022-06-29 PROCEDURE — 90791 PSYCH DIAGNOSTIC EVALUATION: CPT | Performed by: PSYCHOLOGIST

## 2022-06-29 RX ORDER — CELECOXIB 200 MG/1
200 CAPSULE ORAL 2 TIMES DAILY
COMMUNITY
End: 2023-01-23

## 2022-06-29 RX ORDER — OXYBUTYNIN CHLORIDE 10 MG/1
10 TABLET, EXTENDED RELEASE ORAL DAILY
COMMUNITY
Start: 2022-06-20 | End: 2022-07-06

## 2022-06-29 NOTE — PROGRESS NOTES
PROGRESS NOTE    Data:    Grace Jacob is a 57 y.o. female who met with the undersigned for a scheduled psychological evaluation from 9:00 - 9:40am.      Clinical Maneuvering/Intervention:      Chief complaint and history of presenting illness/Problems: struggling with obesity for several years. Despite trying different weight loss plans and diets, the pt reported being unsuccessful in losing weight. A psychological evaluation was conducted in order to assess past and current level of functioning. Areas assessed included, but were not limited to: perception of social support, perception of ability to face and deal with challenges in life (positive functioning), anxiety symptoms, depressive symptoms, perspective on beliefs/belief system, coping skills for stress, intelligence level, addiction issues, etc. Therapeutic rapport was established. Interventions conducted today were geared towards assessing the pt's readiness for weight loss surgery and identifying and psychological contraindications for undergoing such a major life change. Social support was deemed strong (specific to weight loss surgery/weight loss in this manner and in a general sense): , children, brother, doctors, and several friends. Current psychological struggles were described as low, but included: depression situational to being overweight and having chronic pain. Coping skills for distress and related to undergoing a major life change such as weight loss surgery/weight loss were deemed strong and included being a level-headed person, good sense of humor, follows directions well, being an open/honest person, responsible person, maintains quality relationships with others, and believes in herself that she will be successful with weight loss surgery. The pt endorsed having characteristics of readiness to undergo major life changes inherent in the journey of weight loss surgery. She could speak to having 'suffered enough,' and the  decision to have weight loss surgery is one she feels confident about. The pt expressed gratitude for today's visit.     Past Family and Social History:      History of family mental health problems: mother (bipolar disorder)    Psychosocial history: treatment of psychiatric care in the past (several sessions with her daughter; several years ago), alcohol/substance abuse treatment in the past (N/A) , alcohol/substance abuse problems (N/A), inpatient psychiatric care (N/A).    Mental Status Exam (MSE):  Hygiene:  good  Dress: normal  Attitude:  cooperative and proactive  Motor Activity: normal  Speech: normal  Mood:   nervous and excited  Affect:  congruent  Thought Processes: normal  Thought Content:  normal  Suicidal Thoughts:  not endorsed  Homicidal Thoughts:  not endorsed  Crisis Safety Plan: not needed   Hallucinations:  none      Patient's Support Network Includes:  family, friends      Progress toward goal: there is evidence to suggest that she is taking measures to improve the quality of her life including seeking weight loss surgery.       Functional Status: moderate to high      Prognosis: good with weight loss surgery    Evaluation, Diagnoses, and Ability/Capacity to Respond to Treatment:      The pt presented to be struggling with depression situational to obesity (BMI = 39.78, obese) and chronic joint pain. Results of MSE demonstrated a functional status of moderate to high. Strengths: belief in self that she will be successful with weight loss surgery, etc (see detailed list of coping skills above). Needed for growth (CPT code requirement for Weaknesses): weight loss.      From a psychological standpoint, the pt presents as a good candidate for bariatric surgery. She is motivated for the surgery, has showed readiness for the lifestyle change in terms of starting to adjust her eating habits, and seems to have appropriate expectations of how to prepare and how to live after surgery in order to lose  weight successfully.    Treatment Plan:      Short term goals: Start improving her health by following up with her bariatric surgeon in order to receive weight loss surgery as soon as feasible/appropriate and demonstrate success with compliance to adhering to the recommended diet. Long term goals: reach a healthy weight and remittance of depression/pain via taking control over her health.    Teresa Chen, PhD, LP

## 2022-06-29 NOTE — PROGRESS NOTES
[facility] GROUP BARIATRIC SURGERY  [address]  [address]  [facility] [id number] [phone]      Patient  Name:  Grace Jacob  :  1965      Date of Visit: 2022      Chief Complaint:  weight gain; unable to maintain weight loss      History of Present Illness:  Grace Jacob is a 57 y.o. female who presents today for evaluation, education and consultation regarding metabolic and bariatric surgery with Dr. Fernandez.     Grace has been overweight for at least 24+ years, has been 35 pounds or more overweight for at least 24 years, has been 100 pounds or more overweight for 20+ or more years and started dieting at age 19.  Previous diet attempts include: Herbal Life and Slim Fast; Diet Center; Phenteramine.  The most weight Grace lost was 25 pounds using phenteramine and Saxenda, but was unable to maintain that weight loss.  Her maximum lifetime weight is 267 pounds.      GI: History of heartburn and dysphagia.  EGD in  with Dr. Constantino status post esophageal dilatation.  Chronic, episodic breakthrough treated with as needed Tums.  History of IBS with both constipation and diarrhea.  Underwent laparoscopic cholecystectomy  for gallstones.    Cardiac/Pulm: History of hypertension and obstructive sleep apnea on CPAP.  She is a lifetime non-smoker, but her son that lives with her vapes.  She tells me he does not vape in the house, but will vape in the car.    Other past medical history includes chronic low back and bilateral knee pain.  She takes Celebrex daily.  She also receives steroid injections in her lumbar spine every 3 months.  She has a history of urinary incontinence and frequent UTIs.      Complete history has been obtained and discussed today, as pertinent to metabolic/ bariatric surgery.     Past Medical History:   Diagnosis Date   • Anxiety    • Arthritis     On celebrex; steroid injections q3 months in back   • Chronic pain     BILATERAL KNEE; on  cymbalta.   • DDD (degenerative disc disease), lumbar     on celebrex   • Dysphagia     EGD 2021- s/p esophageal dilatation   • Frequent UTI    • Gallbladder problem    • Heartburn     chronic, episodic. Tums PRN. EGD 2021. No hx of h pylori   • Herpes    • HTN (hypertension)     previously on lisinopril and Toprol   • IBS (irritable bowel syndrome)    • Left knee pain    • STEPHANIE on CPAP    • PONV (postoperative nausea and vomiting)    • Pregnancy    • S/P laparoscopic cholecystectomy     gallstones   • Urinary incontinence      Past Surgical History:   Procedure Laterality Date   • HIP ARTHROPLASTY Left 2020   • LAPAROSCOPIC CHOLECYSTECTOMY  1994    gallstones   • TOTAL HIP ARTHROPLASTY Right 10/19/2018    Procedure: RIGHT TOTAL  ANTERIOR HIP ARTHROPLASTY;  Surgeon: Mary Be MD;  Location: [facility] OR;  Service: Orthopedics   • TOTAL KNEE ARTHROPLASTY      bilateral   • TOTAL KNEE ARTHROPLASTY REVISION Left 04/06/2021    Procedure: LEFT KNEE REVISION;  Surgeon: Mary Be MD;  Location: [facility] OR;  Service: Orthopedics;  Laterality: Left;       Allergies   Allergen Reactions   • Penicillins Rash       Current Outpatient Medications:   •  celecoxib (CeleBREX) 200 MG capsule, Take 200 mg by mouth 2 (Two) Times a Day., Disp: , Rfl:   •  DULoxetine (CYMBALTA) 60 MG capsule, Take 60 mg by mouth Every Night., Disp: , Rfl:   •  fluconazole (Diflucan) 150 MG tablet, 1 po now repeat 1 week, Disp: 2 tablet, Rfl: 2  •  fluticasone (FLONASE) 50 MCG/ACT nasal spray, , Disp: , Rfl:   •  Glucosamine HCl (GLUCOSAMINE PO), Take 2 tablets by mouth Daily., Disp: , Rfl:   •  oxybutynin XL (DITROPAN-XL) 10 MG 24 hr tablet, Take 10 mg by mouth Daily., Disp: , Rfl:   •  valACYclovir (VALTREX) 500 MG tablet, TAKE 1 TABLET BY MOUTH DAILY, Disp: 30 tablet, Rfl: 8  •  Liraglutide (SAXENDA) 18 MG/3ML injection pen, Inject 3 mg under the skin into the appropriate area as directed Daily., Disp: 5 pen, Rfl:  0  •  metoprolol succinate XL (TOPROL-XL) 25 MG 24 hr tablet, take 1 tablet by oral route  every day for blood pressure, Disp: , Rfl:   •  Saxenda 18 MG/3ML injection pen, , Disp: , Rfl:     Social History     Socioeconomic History   • Marital status:    Tobacco Use   • Smoking status: Never Smoker   • Smokeless tobacco: Never Used   Vaping Use   • Vaping Use: Never used   Substance and Sexual Activity   • Alcohol use: No   • Drug use: No   • Sexual activity: Defer     Social History     Social History Narrative    Lives in [place], [place].  w/ 2 children. Works for the [employer].        Family History   Problem Relation Age of Onset   • Alzheimer's disease Mother    • Diabetes Father    • Coronary artery disease Father    • Heart disease Father    • Cancer Brother    • Depression Brother    • Diabetes Brother    • BRCA 1/2 Neg Hx    • Breast cancer Neg Hx    • Colon cancer Neg Hx    • Endometrial cancer Neg Hx    • Ovarian cancer Neg Hx    • Malig Hyperthermia Neg Hx    • Uterine cancer Neg Hx        Review of Systems:  Constitutional:  reports fatigue, weight gain and denies fevers, chills.  HEENT:  denies headache, ear pain or loss of hearing, blurred or double vision, nasal discharge or sore throat.  Cardiovascular:  reports HTN and denies HLD, CAD, Atrial Fib, hx heart disease, heart murmur, hx MI, chest pain, palpitations, edema, hx DVT.  Respiratory:  reports sleep apnea and denies dyspnea on exertion, shortness of breath , cough , wheezing, asthma, COPD, hx PE.  Gastrointestinal:  reports dysphagia, heartburn, IBS, diarrhea, constipation, gallbladder issues and denies nausea, vomiting, abdominal pain, melena, blood in stool, liver disease, hx pancreatitis.  Genitourinary:  reports history of  frequent UTI, incontinence and denies hematuria, dysuria, polyuria, polydipsia, renal insufficiency, renal failure.    Musculoskeletal:  reports joint pain, back pain, arthritis and denies fibromyalgia and  autoimmune disease.  Neurological:  reports none and denies headaches, migraines, numbness /tingling, dizziness, confusion, seizure, stroke.  Psychiatric:  reports none and denies depressed mood, hx depression, feeling anxious, hx anxiety, bipolar disorder, suicidal ideation, hx suicide attempt, hx self injury, hx substance abuse, eating disorder.  Endocrine:  reports none and denies PCOS, endometriosis, glucose intolerance, diabetes, thyroid disease, gout.  Hematologic:  reports none and denies bruising, bleeding disorder, hx anemia, hx blood transfusion.  Skin:  reports none and denies rashes, hx MRSA.      COVID-19 Questionnaire:    1.  Have you previously been tested for COVID-19?    []  No  [x]  Yes  2.  Were you ever positive for COVID-19?    []  No  [x]  Yes  3.  Are you employed in a healthcare setting?    [x]  No  []  Yes  4.  Are you symptomatic for COVID-19 as defined by the CDC (fever, cough)?  If so, when did symptoms begin?    [x]  No  []  Yes, symptoms began **  5.  Have you been hospitalized for COVID-19?  If so, were you in the ICU?  [x]  No  []  Yes, but not in the ICU  []  Yes, and I was in the ICU  6.  Are you a resident in a congregate (group care setting?)    [x]  No  []  Yes  7.  Are you pregnant?  [x]  No  []  Yes      Physical Exam:  Vital Signs:  Weight: 115 kg (254 lb)   Body mass index is 39.78 kg/m².  Temp: 97.3 °F (36.3 °C)   Heart Rate: 78   BP: 132/78     Physical Exam  Constitutional:       Appearance: She is obese.   HENT:      Head: Normocephalic and atraumatic.   Eyes:      Extraocular Movements: Extraocular movements intact.      Conjunctiva/sclera: Conjunctivae normal.   Cardiovascular:      Rate and Rhythm: Normal rate and regular rhythm.   Pulmonary:      Effort: Pulmonary effort is normal.      Breath sounds: Normal breath sounds.   Abdominal:      General: Bowel sounds are normal. There is no distension.      Palpations: Abdomen is soft. There is no mass.      Tenderness:  There is no abdominal tenderness.      Comments: Old lap scars   Musculoskeletal:         General: Normal range of motion.      Cervical back: Normal range of motion and neck supple.   Skin:     General: Skin is warm and dry.   Neurological:      General: No focal deficit present.      Mental Status: She is alert and oriented to person, place, and time.   Psychiatric:         Mood and Affect: Mood normal.         Behavior: Behavior normal.         Thought Content: Thought content normal.         Judgment: Judgment normal.         Patient Active Problem List   Diagnosis   • Obesity (BMI 30-39.9)   • Anxiety   • PONV (postoperative nausea and vomiting)   • Status post revision of total knee replacement, left   • Chronic pain   • Chest pain, atypical   • Shortness of breath   • HTN (hypertension)   • STEPHANIE on CPAP   • Heartburn   • Dysphagia   • IBS (irritable bowel syndrome)   • S/P laparoscopic cholecystectomy   • PONV (postoperative nausea and vomiting)   • Urinary incontinence   • DDD (degenerative disc disease), lumbar   • Arthritis   • Frequent UTI       Assessment:  57 y.o. female with medically complicated obesity pursuing sleeve gastrectomy.    Metabolic & Bariatric Surgery is deemed medically necessary given the following: Class 2 Severe Obesity (BMI >=35 and <=39.9). Obesity-related health conditions include the following: obstructive sleep apnea, hypertension, GERD and urinary stress incontinence. Obesity is worsening. BMI is is above average; BMI management plan is completed. We discussed consulting a Bariatric surgeon.        Plan:  Further evaluation will include: CBC, CMP, Lipids, TSH, HgA1C, H.Pylori UBT, EKG, CXR and EGD.    Recent EGD in 2021 with Dr. Constantino.  Will obtain records for review.    Additional clearances needed prior to surgery will include: Cardiology.     Patient has seen Dr. Lockhart within the last year.  She will arrange for appointment with their office.    Patient understands that  bariatric surgery is not cosmetic surgery but rather a tool to help make a lifelong commitment to lifestyle changes including diet, exercise and behavior modifications.  The patient has been educated today on those expected postoperative lifestyle changes.  Psychological and Nutritional consultations will be completed prior to surgery.  Instructions on how to access [employer] (an internet based site w/ educational surgical videos) were given to the patient.  Recommended perioperative vitamin supplementation was reviewed.  The importance of avoiding ASA/ NSAIDS/ steroids/ tobacco/nicotine/ hormones/ immunomodulators perioperatively was discussed in detail.  All questions/concerns have been addressed.      Further input to follow pending the above.           JAMAR Stock

## 2022-06-29 NOTE — PROGRESS NOTES
Bariatric Nutrition Consult     Name: Grace Jaocb   : 1965   AGE: 57 y.o.   MRN: [id number]      Consult Date: 2022     Surgery desired: sleeve    Height: 170.2cm                  Current weight: 254lbs                    BMI: 39.78    Highest weight: 267lbs                           Lowest weight: unknown    Goals: 175-190lbs        Past Medical History:   Diagnosis Date   • Anxiety    • Arthritis     On celebrex; steroid injections q3 months in back   • Chronic pain     BILATERAL KNEE; on cymbalta.   • DDD (degenerative disc disease), lumbar     on celebrex   • Dysphagia     EGD - s/p esophageal dilatation   • Frequent UTI    • Gallbladder problem    • Heartburn     chronic, episodic. Tums PRN. EGD . No hx of h pylori   • Herpes    • HTN (hypertension)     previously on lisinopril and Toprol   • IBS (irritable bowel syndrome)    • Left knee pain    • STEPHANIE on CPAP    • PONV (postoperative nausea and vomiting)    • Pregnancy    • S/P laparoscopic cholecystectomy     gallstones   • Urinary incontinence                                  Diet history reveals 2 -3 meals and 1 snack daily, high in fat and carbs. Fast food ([employer] breakfast) 1-2/7 on way to work.  Breakfast: skips or cherrios and 2% milk or burrito from [employer]  Lunch: leftovers/bologna and cheese sandwich on white bread  Dinner: eats out : chilis: potato soup and salad/ham and cheese at [employer]/chicken quesidilla/shrimp or chicken with queso and rice Cooks: roast, crockpot, chicken, burgers  Snack: ice cream      Protein sources: meat, chicken, cheese, premier protein drinks    Drinks: propel zero, mt dew, sweet tea    Food allergies/intolerances: none    Night eating: no    Patient has/has not been diagnosed with an eating disorder: no    Exercise/activity: no    Main bariatric nutrition principles discussed and explained. Patient needs to focus on 100g protein daily, 100-140g carbohydrates daily, healthy fat intake, 64 oz  fluid daily, no carbonation, and try protein drinks/protein powders. Patient verbalized understanding and queries were answered.  Additional nutritional counseling will be available      Niya Edmonds RD,LD

## 2022-06-30 LAB
ALBUMIN SERPL-MCNC: 4.2 G/DL (ref 3.8–4.9)
ALBUMIN/GLOB SERPL: 1.5 {RATIO} (ref 1.2–2.2)
ALP SERPL-CCNC: 116 IU/L (ref 44–121)
ALT SERPL-CCNC: 16 IU/L (ref 0–32)
AST SERPL-CCNC: 19 IU/L (ref 0–40)
BASOPHILS # BLD AUTO: 0 X10E3/UL (ref 0–0.2)
BASOPHILS NFR BLD AUTO: 1 %
BILIRUB SERPL-MCNC: 0.5 MG/DL (ref 0–1.2)
BUN SERPL-MCNC: 18 MG/DL (ref 6–24)
BUN/CREAT SERPL: 23 (ref 9–23)
CALCIUM SERPL-MCNC: 9.5 MG/DL (ref 8.7–10.2)
CHLORIDE SERPL-SCNC: 101 MMOL/L (ref 96–106)
CHOLEST SERPL-MCNC: 212 MG/DL (ref 100–199)
CO2 SERPL-SCNC: 26 MMOL/L (ref 20–29)
CREAT SERPL-MCNC: 0.78 MG/DL (ref 0.57–1)
EGFRCR SERPLBLD CKD-EPI 2021: 88 ML/MIN/1.73
EOSINOPHIL # BLD AUTO: 0.2 X10E3/UL (ref 0–0.4)
EOSINOPHIL NFR BLD AUTO: 3 %
ERYTHROCYTE [DISTWIDTH] IN BLOOD BY AUTOMATED COUNT: 13.7 % (ref 11.7–15.4)
GLOBULIN SER CALC-MCNC: 2.8 G/DL (ref 1.5–4.5)
GLUCOSE SERPL-MCNC: 91 MG/DL (ref 65–99)
HBA1C MFR BLD: 5.9 % (ref 4.8–5.6)
HCT VFR BLD AUTO: 41.4 % (ref 34–46.6)
HDLC SERPL-MCNC: 57 MG/DL
HGB BLD-MCNC: 13.9 G/DL (ref 11.1–15.9)
IMM GRANULOCYTES # BLD AUTO: 0 X10E3/UL (ref 0–0.1)
IMM GRANULOCYTES NFR BLD AUTO: 0 %
LDLC SERPL CALC-MCNC: 139 MG/DL (ref 0–99)
LYMPHOCYTES # BLD AUTO: 2.4 X10E3/UL (ref 0.7–3.1)
LYMPHOCYTES NFR BLD AUTO: 27 %
MCH RBC QN AUTO: 31 PG (ref 26.6–33)
MCHC RBC AUTO-ENTMCNC: 33.6 G/DL (ref 31.5–35.7)
MCV RBC AUTO: 92 FL (ref 79–97)
MONOCYTES # BLD AUTO: 0.6 X10E3/UL (ref 0.1–0.9)
MONOCYTES NFR BLD AUTO: 7 %
NEUTROPHILS # BLD AUTO: 5.5 X10E3/UL (ref 1.4–7)
NEUTROPHILS NFR BLD AUTO: 62 %
PLATELET # BLD AUTO: 328 X10E3/UL (ref 150–450)
POTASSIUM SERPL-SCNC: 4.9 MMOL/L (ref 3.5–5.2)
PROT SERPL-MCNC: 7 G/DL (ref 6–8.5)
RBC # BLD AUTO: 4.48 X10E6/UL (ref 3.77–5.28)
SODIUM SERPL-SCNC: 139 MMOL/L (ref 134–144)
TRIGL SERPL-MCNC: 91 MG/DL (ref 0–149)
TSH SERPL DL<=0.005 MIU/L-ACNC: 2.24 UIU/ML (ref 0.45–4.5)
UREA BREATH TEST QL: NEGATIVE
VLDLC SERPL CALC-MCNC: 16 MG/DL (ref 5–40)
WBC # BLD AUTO: 8.8 X10E3/UL (ref 3.4–10.8)

## 2022-07-04 ENCOUNTER — PATIENT ROUNDING (BHMG ONLY) (OUTPATIENT)
Dept: BARIATRICS/WEIGHT MGMT | Facility: CLINIC | Age: 57
End: 2022-07-04

## 2022-07-04 NOTE — PROGRESS NOTES
A [employer] message has been sent to the patient for PATIENT ROUNDING for [facility].

## 2022-07-06 ENCOUNTER — OFFICE VISIT (OUTPATIENT)
Dept: CARDIOLOGY | Facility: CLINIC | Age: 57
End: 2022-07-06

## 2022-07-06 ENCOUNTER — TELEMEDICINE (OUTPATIENT)
Dept: FAMILY MEDICINE CLINIC | Facility: CLINIC | Age: 57
End: 2022-07-06

## 2022-07-06 VITALS
DIASTOLIC BLOOD PRESSURE: 74 MMHG | WEIGHT: 255 LBS | RESPIRATION RATE: 12 BRPM | OXYGEN SATURATION: 97 % | SYSTOLIC BLOOD PRESSURE: 130 MMHG | BODY MASS INDEX: 40.02 KG/M2 | HEIGHT: 67 IN | HEART RATE: 90 BPM | TEMPERATURE: 98 F

## 2022-07-06 DIAGNOSIS — Z01.810 PREOP CARDIOVASCULAR EXAM: Primary | ICD-10-CM

## 2022-07-06 DIAGNOSIS — E66.9 OBESITY (BMI 30-39.9): ICD-10-CM

## 2022-07-06 DIAGNOSIS — M19.90 CHRONIC ARTHRITIS: ICD-10-CM

## 2022-07-06 DIAGNOSIS — F32.A MILD DEPRESSION: Primary | ICD-10-CM

## 2022-07-06 DIAGNOSIS — E66.01 MORBID (SEVERE) OBESITY DUE TO EXCESS CALORIES: ICD-10-CM

## 2022-07-06 DIAGNOSIS — Z99.89 OSA ON CPAP: ICD-10-CM

## 2022-07-06 DIAGNOSIS — G47.33 OSA ON CPAP: ICD-10-CM

## 2022-07-06 PROCEDURE — 99213 OFFICE O/P EST LOW 20 MIN: CPT | Performed by: PHYSICIAN ASSISTANT

## 2022-07-06 PROCEDURE — 99214 OFFICE O/P EST MOD 30 MIN: CPT | Performed by: INTERNAL MEDICINE

## 2022-07-06 NOTE — PROGRESS NOTES
[facility] [place]  [facility] CARDIOLOGY  [address] [place] [address]  Dept: [phone]  Dept Fax: [phone]    Grace Jacob  1965    Follow Up Office Visit Note    History of Present Illness:  Grace Jacob is a 57 y.o. female who presents to the clinic for  Preop- She is planning to have sleeve surgery bariatric and here for clearance , She denies any chest pain, she has knee replacement bilateral she does not exercises much due to her knees, she has prior echo and stress nuclear normal, she  now  Has prediabetes, BP is 130.70. she also has STEPHANIE and is using the CPAP and feels better , she has mild diastolic dysfunction and mild increase RVSP .her EKG sinus with low voltage.,  There is no contraindication for her to go for surgery,     The following portions of the patient's history were reviewed and updated as appropriate: allergies, current medications, past family history, past medical history, past social history, past surgical history and problem list.    Medications:  celecoxib  DULoxetine  fluticasone  GLUCOSAMINE PO  Saxenda solution pen-injector  valACYclovir    Subjective  Allergies   Allergen Reactions   • Penicillins Rash        Past Medical History:   Diagnosis Date   • Anxiety    • Arthritis     On celebrex; steroid injections q3 months in back   • Chronic pain     BILATERAL KNEE; on cymbalta.   • DDD (degenerative disc disease), lumbar     on celebrex   • Dysphagia     EGD 2021- s/p esophageal dilatation   • Frequent UTI    • Gallbladder problem    • Heartburn     chronic, episodic. Tums PRN. EGD 2021. No hx of h pylori   • Herpes    • HTN (hypertension)     previously on lisinopril and Toprol   • IBS (irritable bowel syndrome)    • Left knee pain    • STEPHANIE on CPAP    • PONV (postoperative nausea and vomiting)    • Pregnancy    • S/P laparoscopic cholecystectomy     gallstones   • Urinary incontinence        Past Surgical History:   Procedure  Laterality Date   • HIP ARTHROPLASTY Left 2020   • LAPAROSCOPIC CHOLECYSTECTOMY  1994    gallstones   • TOTAL HIP ARTHROPLASTY Right 10/19/2018    Procedure: RIGHT TOTAL  ANTERIOR HIP ARTHROPLASTY;  Surgeon: Mary Be MD;  Location: [facility] OR;  Service: Orthopedics   • TOTAL KNEE ARTHROPLASTY      bilateral   • TOTAL KNEE ARTHROPLASTY REVISION Left 04/06/2021    Procedure: LEFT KNEE REVISION;  Surgeon: Mary Be MD;  Location: [facility] MAIN OR;  Service: Orthopedics;  Laterality: Left;       Family History   Problem Relation Age of Onset   • Alzheimer's disease Mother    • Diabetes Father    • Coronary artery disease Father    • Heart disease Father    • Cancer Brother    • Depression Brother    • Diabetes Brother    • BRCA 1/2 Neg Hx    • Breast cancer Neg Hx    • Colon cancer Neg Hx    • Endometrial cancer Neg Hx    • Ovarian cancer Neg Hx    • Malig Hyperthermia Neg Hx    • Uterine cancer Neg Hx         Social History     Socioeconomic History   • Marital status:    Tobacco Use   • Smoking status: Never Smoker   • Smokeless tobacco: Never Used   Vaping Use   • Vaping Use: Never used   Substance and Sexual Activity   • Alcohol use: No   • Drug use: No   • Sexual activity: Defer       Review of Systems   Constitutional: Negative.    HENT: Negative.    Respiratory: Positive for shortness of breath.    Cardiovascular: Negative.    Endocrine: Negative.    Genitourinary: Negative.    Musculoskeletal: Negative.    Skin: Negative.    Allergic/Immunologic: Negative.    Neurological: Negative.    Hematological: Negative.    Psychiatric/Behavioral: Negative.        Cardiovascular Procedures    ECHO/MUGA:   STRESS TESTS:   CARDIAC CATH:   DEVICES:   HOLTER:   CT/MRI:   VASCULAR:   CARDIOTHORACIC:     Objective  Vitals:    07/06/22 1508   BP: 130/74   BP Location: Left arm   Patient Position: Sitting   Cuff Size: Adult   Pulse: 90   Resp: 12   Temp: 98 °F (36.7 °C)   TempSrc: Infrared  "  SpO2: 97%   Weight: 116 kg (255 lb)   Height: 170.2 cm (67\")   PainSc: 0-No pain     Body mass index is 39.94 kg/m².     Physical Exam  Constitutional:       Appearance: Healthy appearance. Not in distress.   Neck:      Vascular: No JVR. JVD normal.   Pulmonary:      Effort: Pulmonary effort is normal.      Breath sounds: Normal breath sounds. No wheezing. No rhonchi. No rales.   Chest:      Chest wall: Not tender to palpatation.   Cardiovascular:      PMI at left midclavicular line. Normal rate. Regular rhythm. Normal S1. Normal S2.      Murmurs: There is no murmur.      No gallop. No click. No rub.   Pulses:     Intact distal pulses.   Edema:     Peripheral edema absent.   Abdominal:      General: Bowel sounds are normal.      Palpations: Abdomen is soft.      Tenderness: There is no abdominal tenderness.   Musculoskeletal: Normal range of motion.         General: No tenderness. Skin:     General: Skin is warm and dry.   Neurological:      General: No focal deficit present.      Mental Status: Alert and oriented to person, place and time.          Diagnostic Data  Procedures    Assessment and Plan  Diagnoses and all orders for this visit:    Preop cardiovascular exam- she is a low risk for any surgery, , no contraindications for surgery    Obesity (BMI 30-39.9)- She is planning to have bariatric surgery  STEPHANIE- She feels better since using CPAP          Return if symptoms worsen or fail to improve.    Zachary Lockhart MD  07/06/2022  "

## 2022-07-07 PROBLEM — M19.90 CHRONIC ARTHRITIS: Status: ACTIVE | Noted: 2022-07-07

## 2022-07-07 PROBLEM — F32.A MILD DEPRESSION: Status: ACTIVE | Noted: 2022-07-07

## 2022-07-07 PROBLEM — E66.01 MORBID (SEVERE) OBESITY DUE TO EXCESS CALORIES (HCC): Status: ACTIVE | Noted: 2022-07-07

## 2022-07-07 RX ORDER — DULOXETIN HYDROCHLORIDE 60 MG/1
60 CAPSULE, DELAYED RELEASE ORAL NIGHTLY
Qty: 90 CAPSULE | Refills: 1 | Status: SHIPPED | OUTPATIENT
Start: 2022-07-07 | End: 2023-01-23 | Stop reason: SDUPTHER

## 2022-07-07 NOTE — ASSESSMENT & PLAN NOTE
Patient's weight is worsening.  She has osteoarthritis and sleep apnea.  Patient will send list of needed documentation for bariatric surgeon through [facility].   She will continue follow up with bariatrics

## 2022-07-07 NOTE — PROGRESS NOTES
"Chief Complaint  Weight Loss (/Patient would like to discuss bariatric surgeon visit)    Subjective        Grace Jacob presents to [facility]  Patient reports today secondary to needing documentation about her weight loss attempts.  Patient reports she will send needed information through[employer].  Patient reports she is considering gastric sleeve.    Patient also reports having chronic arthritis and depression states she would like to have refill of duloxetin states it works well.    Weight Loss    Patient was seen today through synchronous audio/video technology. Verbal consent was obtained. The patient was located at home. Vitals signs were not obtained due to lack of home monitoring access. .      Objective   Vital Signs:  There were no vitals taken for this visit.  Estimated body mass index is 39.94 kg/m² as calculated from the following:    Height as of an earlier encounter on 7/6/22: 170.2 cm (67\").    Weight as of an earlier encounter on 7/6/22: 116 kg (255 lb).           Physical Exam  Vitals and nursing note reviewed.   Constitutional:       General: She is not in acute distress.     Appearance: Normal appearance.   HENT:      Head: Normocephalic.      Right Ear: Hearing normal.      Left Ear: Hearing normal.   Eyes:      Pupils: Pupils are equal, round, and reactive to light.   Pulmonary:      Effort: Pulmonary effort is normal. No respiratory distress.   Neurological:      Mental Status: She is alert.   Psychiatric:         Mood and Affect: Mood normal.        Result Review :    Patient was seen today through synchronous audio/video technology. Verbal consent was obtained. The patient was located at home. Vitals signs were not obtained due to lack of home monitoring access.         Assessment and Plan   Diagnoses and all orders for this visit:    1. Mild depression (Primary)  Assessment & Plan:  Refilled patients cymbalta    Orders:  -     DULoxetine (CYMBALTA) 60 " MG capsule; Take 1 capsule by mouth Every Night. For mood and pain  Dispense: 90 capsule; Refill: 1    2. Chronic arthritis  Assessment & Plan:  Patient will continue cymbalta    Orders:  -     DULoxetine (CYMBALTA) 60 MG capsule; Take 1 capsule by mouth Every Night. For mood and pain  Dispense: 90 capsule; Refill: 1    3. Morbid (severe) obesity due to excess calories (HCC)  Assessment & Plan:  Patient's weight is worsening.  She has osteoarthritis and sleep apnea.  Patient will send list of needed documentation for bariatric surgeon through [facility].   She will continue follow up with bariatrics             Follow Up   No follow-ups on file.  Patient was given instructions and counseling regarding her condition or for health maintenance advice. Please see specific information pulled into the AVS if appropriate.

## 2022-08-30 DIAGNOSIS — E66.01 MORBID (SEVERE) OBESITY DUE TO EXCESS CALORIES: Primary | ICD-10-CM

## 2022-09-07 ENCOUNTER — TELEPHONE (OUTPATIENT)
Dept: FAMILY MEDICINE CLINIC | Facility: CLINIC | Age: 57
End: 2022-09-07

## 2022-09-07 NOTE — TELEPHONE ENCOUNTER
PATIENT STATES THIS IS FOR BARIATRIC WHICH PCP IS AWARE. PATIENT IS HOPING TO GET THIS DONE Friday

## 2022-09-07 NOTE — TELEPHONE ENCOUNTER
Caller: Grace Jacob    Relationship: Self    Best call back number: [phone]    What orders are you requesting (i.e. lab or imaging): CT SCAN    In what timeframe would the patient need to come in: ASAP    Where will you receive your lab/imaging services: [facility]    Additional notes: Friday MORNING IS IDEAL

## 2022-09-07 NOTE — TELEPHONE ENCOUNTER
Called patient back and unable to leave voicemail.   What is the CT for and she needs to schedule and appointment.  HUB CAN READ

## 2022-09-08 NOTE — TELEPHONE ENCOUNTER
.*HUB MAY READ*  Please advise patient she needs to schedule an appointment in order for me to order a CT scan.  I understand that she is going to have bariatric surgery however I do not know the reason for the CT scan or what area needs to be scanned.  Her insurance will want an office visit so that I can put in the correct coding and then order the CT scan.  Patient needs an appointment or she can inquire with her bariatric surgeon about ordering the CT scan.

## 2022-09-20 ENCOUNTER — TELEMEDICINE (OUTPATIENT)
Dept: FAMILY MEDICINE CLINIC | Facility: CLINIC | Age: 57
End: 2022-09-20

## 2022-09-20 ENCOUNTER — DOCUMENTATION (OUTPATIENT)
Dept: FAMILY MEDICINE CLINIC | Facility: CLINIC | Age: 57
End: 2022-09-20

## 2022-09-20 VITALS — BODY MASS INDEX: 39.24 KG/M2 | WEIGHT: 250 LBS | HEIGHT: 67 IN

## 2022-09-20 DIAGNOSIS — Z01.818 PREOP TESTING: Primary | ICD-10-CM

## 2022-09-20 PROCEDURE — 99213 OFFICE O/P EST LOW 20 MIN: CPT | Performed by: PHYSICIAN ASSISTANT

## 2022-09-20 RX ORDER — FLUCONAZOLE 150 MG/1
150 TABLET ORAL ONCE
Qty: 3 TABLET | Refills: 3 | Status: SHIPPED | OUTPATIENT
Start: 2022-09-20 | End: 2022-09-20

## 2022-09-20 NOTE — PROGRESS NOTES
"Chief Complaint  Imaging Only (Chest xray for surgery)    Subjective         Grace Jacob presents to [facility]  History of Present Illness  Patient reports today secondary to being scheduled for gastric sleeve surgery in a couple weeks.  Patient reports she needs a chest x-ray and labs performed today.  Patient reports she does not have an order for the labs and is unsure what labs need to be performed.    Objective   Vital Signs:   Ht 170.2 cm (67\")   Wt 113 kg (250 lb)   BMI 39.16 kg/m²     Physical Exam   Pulmonary/Chest: Effort normal.   Psychiatric: She has a normal mood and affect.     Result Review :                 Assessment and Plan    Diagnoses and all orders for this visit:    1. Preop testing (Primary)  Assessment & Plan:  We will order patient's chest x-ray however advised patient that I do not have an order for blood work and I am unsure what her surgeon requires.  Patient will have either surgeon placed order in [facility] chart or she will get a paper order and bring it back to clinic for further evaluation          Follow Up   No follow-ups on file.  Patient was given instructions and counseling regarding her condition or for health maintenance advice. Please see specific information pulled into the AVS if appropriate.     Mode of Visit: Video  Location of patient: home  [facility] [place] [facility]  You have chosen to receive care through a telehealth visit.  Does the patient consent to use a video/audio connection for your medical care today? Yes  The visit included audio and video interaction. No technical issues occurred during this visit.   "

## 2022-09-20 NOTE — ASSESSMENT & PLAN NOTE
We will order patient's chest x-ray however advised patient that I do not have an order for blood work and I am unsure what her surgeon requires.  Patient will have either surgeon placed order in [facility] chart or she will get a paper order and bring it back to clinic for further evaluation

## 2022-09-30 ENCOUNTER — TELEPHONE (OUTPATIENT)
Dept: OBSTETRICS AND GYNECOLOGY | Facility: CLINIC | Age: 57
End: 2022-09-30

## 2022-09-30 DIAGNOSIS — N95.0 PMB (POSTMENOPAUSAL BLEEDING): Primary | ICD-10-CM

## 2022-09-30 NOTE — TELEPHONE ENCOUNTER
PT is bleeding, starting yesterday and is continuing. It is enough that she has to wear a pad. This has happened once before and it is worse this time than it was then.

## 2022-10-31 ENCOUNTER — TELEMEDICINE (OUTPATIENT)
Dept: FAMILY MEDICINE CLINIC | Facility: CLINIC | Age: 57
End: 2022-10-31

## 2022-10-31 DIAGNOSIS — K21.9 CHRONIC GERD: Primary | ICD-10-CM

## 2022-10-31 PROCEDURE — 99213 OFFICE O/P EST LOW 20 MIN: CPT | Performed by: PHYSICIAN ASSISTANT

## 2022-11-01 ENCOUNTER — OFFICE VISIT (OUTPATIENT)
Dept: OBSTETRICS AND GYNECOLOGY | Facility: CLINIC | Age: 57
End: 2022-11-01

## 2022-11-01 VITALS
SYSTOLIC BLOOD PRESSURE: 128 MMHG | BODY MASS INDEX: 40.34 KG/M2 | DIASTOLIC BLOOD PRESSURE: 84 MMHG | WEIGHT: 257 LBS | HEIGHT: 67 IN

## 2022-11-01 DIAGNOSIS — N95.0 PMB (POSTMENOPAUSAL BLEEDING): ICD-10-CM

## 2022-11-01 DIAGNOSIS — Z01.419 PAP TEST, AS PART OF ROUTINE GYNECOLOGICAL EXAMINATION: Primary | ICD-10-CM

## 2022-11-01 PROCEDURE — 99396 PREV VISIT EST AGE 40-64: CPT | Performed by: OBSTETRICS & GYNECOLOGY

## 2022-11-01 NOTE — PROGRESS NOTES
Gynecologic Annual Exam Note        GYN Annual Exam     CC - Here for annual exam.        HPI  Grace Jacob is a 57 y.o. female, , who presents for annual well woman exam as a established patient.  She is postmenopausal. Reports vaginal bleeding, describes as bright red bleeding.  Patient reports problems with: none. There were no changes to her medical or surgical history since her last visit.. Partner Status: Marital Status: .  She is is sexually active. She has not had new partners.. STD testing recommendations have been explained to the patient and she does not desire STD testing.    Patient with PMB that began 2022. States lasted 2-3 days with low back pain.     Additional OB/GYN History   On HRT? No    Last Pap : 2021. Results: negative. HPV: not done  Last Completed Pap Smear          Ordered - PAP SMEAR (Every 3 Years) Ordered on 2021  Pap IG, Rfx HPV ASCU    2020  Done - negative              History of abnormal Pap smear: no  Family history of uterine, colon, breast, or ovarian cancer: no  Performs monthly Self-Breast Exam: yes  Last mammogram: 2021. Done at .    Last Completed Mammogram          MAMMOGRAM (Every 2 Years) Next due on 2021  Mammo Screening Digital Tomosynthesis Bilateral With CAD    2020  Mammo Screening Digital Tomosynthesis Bilateral With CAD    2019  Mammo Diagnostic Digital Tomosynthesis Bilateral With CAD    10/16/2018  Mammo Diagnostic Digital Tomosynthesis Right With CAD    2017  Mammo Screening Digital Tomosynthesis Bilateral With CAD    Only the first 5 history entries have been loaded, but more history exists.              Last colonoscopy: has had a colonoscopy 7 year(s) ago.    Last Completed Colonoscopy          COLORECTAL CANCER SCREENING (COLONOSCOPY - Every 10 Years) Next due on 2025  SCANNED - COLONOSCOPY    2014  COLONOSCOPY (Done - negative)                   Last bone density scan (DEXA): None  Exercises Regularly: no  Feelings of Anxiety or Depression: yes - h/o anxiety      Tobacco Usage?: No       Current Outpatient Medications:   •  celecoxib (CeleBREX) 200 MG capsule, Take 200 mg by mouth 2 (Two) Times a Day., Disp: , Rfl:   •  DULoxetine (CYMBALTA) 60 MG capsule, Take 1 capsule by mouth Every Night. For mood and pain, Disp: 90 capsule, Rfl: 1  •  Glucosamine HCl (GLUCOSAMINE PO), Take 2 tablets by mouth Daily., Disp: , Rfl:   •  valACYclovir (VALTREX) 500 MG tablet, TAKE 1 TABLET BY MOUTH DAILY, Disp: 30 tablet, Rfl: 8  •  fluticasone (FLONASE) 50 MCG/ACT nasal spray, , Disp: , Rfl:   •  Saxenda 18 MG/3ML injection pen, , Disp: , Rfl:     Patient denies the need for medication refills today.    OB History        3    Para   2    Term   2            AB   1    Living           SAB   1    IAB        Ectopic        Molar        Multiple        Live Births                    Past Medical History:   Diagnosis Date   • Anxiety    • Arthritis     On celebrex; steroid injections q3 months in back   • Chronic pain     BILATERAL KNEE; on cymbalta.   • DDD (degenerative disc disease), lumbar     on celebrex   • Dysphagia     EGD - s/p esophageal dilatation   • Frequent UTI    • Gallbladder problem    • Heartburn     chronic, episodic. Tums PRN. EGD . No hx of h pylori   • Herpes    • HTN (hypertension)     previously on lisinopril and Toprol   • IBS (irritable bowel syndrome)    • Left knee pain    • STEPHANIE on CPAP    • PONV (postoperative nausea and vomiting)    • Pregnancy    • S/P laparoscopic cholecystectomy     gallstones   • Urinary incontinence         Past Surgical History:   Procedure Laterality Date   • HIP ARTHROPLASTY Left    • LAPAROSCOPIC CHOLECYSTECTOMY  1994    gallstones   • TOTAL HIP ARTHROPLASTY Right 10/19/2018    Procedure: RIGHT TOTAL  ANTERIOR HIP ARTHROPLASTY;  Surgeon: Mary Be MD;  Location:   "[facility];  Service: Orthopedics   • TOTAL KNEE ARTHROPLASTY      bilateral   • TOTAL KNEE ARTHROPLASTY REVISION Left 04/06/2021    Procedure: LEFT KNEE REVISION;  Surgeon: Mary Be MD;  Location:  [facility];  Service: Orthopedics;  Laterality: Left;       Health Maintenance   Topic Date Due   • COVID-19 Vaccine (1) Never done   • ANNUAL PHYSICAL  Never done   • TDAP/TD VACCINES (1 - Tdap) Never done   • ZOSTER VACCINE (1 of 2) Never done   • HEPATITIS C SCREENING  Never done   • Annual Gynecologic Pelvic and Breast Exam  03/13/2022   • INFLUENZA VACCINE  08/01/2022   • MAMMOGRAM  08/24/2023   • PAP SMEAR  03/12/2024   • COLORECTAL CANCER SCREENING  11/16/2025   • Pneumococcal Vaccine 0-64  Aged Out       The additional following portions of the patient's history were reviewed and updated as appropriate: allergies, current medications, past family history, past medical history, past social history, past surgical history and problem list.    Review of Systems   Constitutional: Negative.    HENT: Negative.    Eyes: Negative.    Respiratory: Negative.    Cardiovascular: Negative.    Gastrointestinal: Negative.    Endocrine: Negative.    Genitourinary: Positive for vaginal bleeding.   Musculoskeletal: Negative.    Skin: Negative.    Allergic/Immunologic: Negative.    Neurological: Negative.    Hematological: Negative.    Psychiatric/Behavioral: Negative.        I have reviewed and agree with the HPI, ROS, and historical information as entered above. Redd Cavanaugh MD    Objective   /84   Ht 170.2 cm (67\")   Wt 117 kg (257 lb)   BMI 40.25 kg/m²     Physical Exam  Vitals and nursing note reviewed. Exam conducted with a chaperone present.   Constitutional:       Appearance: She is well-developed.   HENT:      Head: Normocephalic and atraumatic.   Neck:      Thyroid: No thyroid mass or thyromegaly.   Cardiovascular:      Rate and Rhythm: Normal rate and regular rhythm.      Heart sounds: No " murmur heard.  Pulmonary:      Effort: Pulmonary effort is normal. No retractions.      Breath sounds: Normal breath sounds. No wheezing, rhonchi or rales.   Chest:      Chest wall: No mass or tenderness.   Breasts:     Right: Normal. No mass, nipple discharge, skin change or tenderness.      Left: Normal. No mass, nipple discharge, skin change or tenderness.   Abdominal:      General: Bowel sounds are normal.      Palpations: Abdomen is soft. Abdomen is not rigid. There is no mass.      Tenderness: There is no abdominal tenderness. There is no guarding.      Hernia: No hernia is present. There is no hernia in the left inguinal area.   Genitourinary:     Labia:         Right: No rash, tenderness or lesion.         Left: No rash, tenderness or lesion.       Vagina: Normal. No vaginal discharge or lesions.      Cervix: No cervical motion tenderness, discharge, lesion or cervical bleeding.      Uterus: Normal. Not enlarged, not fixed and not tender.       Adnexa:         Right: No mass or tenderness.          Left: No mass or tenderness.        Rectum: No external hemorrhoid.   Musculoskeletal:      Cervical back: Normal range of motion. No muscular tenderness.   Neurological:      Mental Status: She is alert and oriented to person, place, and time.   Psychiatric:         Behavior: Behavior normal.            Assessment and Plan    Problem List Items Addressed This Visit    None  Visit Diagnoses     Pap test, as part of routine gynecological examination    -  Primary    Relevant Orders    LIQUID-BASED PAP SMEAR, [facility] ([facility])    CBC & Differential    Comprehensive Metabolic Panel    Hemoglobin A1c    Lipid Panel    Vitamin D,25-Hydroxy    TSH    T4, Free    PMB (postmenopausal bleeding)          EML only 3.2 so no bx needed  Has wt gain and was on Saxenda and vomits with eating so she will start pepcid 20 and Pantaprozol  1. GYN annual well woman exam.   2. Reviewed monthly self breast exams.  Instructed to  call with lumps, pain, or breast discharge.  Yearly mammograms ordered.  3. Ordered mammogram today.  4. Recommended use of Vitamin D and getting adequate calcium in her diet. (1500mg)  5. Reviewed exercise as a preventative health measures.   6. Reviewed BMI and weight loss as preventative health measures.   7. RTC in 1 year or PRN with problems.  8. Return in about 1 year (around 11/1/2023).     Redd Cavanaugh MD  11/01/2022

## 2022-11-02 LAB
25(OH)D3+25(OH)D2 SERPL-MCNC: 47.6 NG/ML (ref 30–100)
ALBUMIN SERPL-MCNC: 4.5 G/DL (ref 3.8–4.9)
ALBUMIN/GLOB SERPL: 1.5 {RATIO} (ref 1.2–2.2)
ALP SERPL-CCNC: 119 IU/L (ref 44–121)
ALT SERPL-CCNC: 52 IU/L (ref 0–32)
AST SERPL-CCNC: 58 IU/L (ref 0–40)
BASOPHILS # BLD AUTO: 0 X10E3/UL (ref 0–0.2)
BASOPHILS NFR BLD AUTO: 0 %
BILIRUB SERPL-MCNC: 0.6 MG/DL (ref 0–1.2)
BUN SERPL-MCNC: 12 MG/DL (ref 6–24)
BUN/CREAT SERPL: 13 (ref 9–23)
CALCIUM SERPL-MCNC: 9.2 MG/DL (ref 8.7–10.2)
CHLORIDE SERPL-SCNC: 98 MMOL/L (ref 96–106)
CHOLEST SERPL-MCNC: 151 MG/DL (ref 100–199)
CO2 SERPL-SCNC: 22 MMOL/L (ref 20–29)
CREAT SERPL-MCNC: 0.95 MG/DL (ref 0.57–1)
EGFRCR SERPLBLD CKD-EPI 2021: 69 ML/MIN/1.73
EOSINOPHIL # BLD AUTO: 0.2 X10E3/UL (ref 0–0.4)
EOSINOPHIL NFR BLD AUTO: 2 %
ERYTHROCYTE [DISTWIDTH] IN BLOOD BY AUTOMATED COUNT: 12.9 % (ref 11.7–15.4)
GLOBULIN SER CALC-MCNC: 3.1 G/DL (ref 1.5–4.5)
GLUCOSE SERPL-MCNC: 99 MG/DL (ref 70–99)
HBA1C MFR BLD: 5.9 % (ref 4.8–5.6)
HCT VFR BLD AUTO: 42.8 % (ref 34–46.6)
HDLC SERPL-MCNC: 50 MG/DL
HGB BLD-MCNC: 14.3 G/DL (ref 11.1–15.9)
IMM GRANULOCYTES # BLD AUTO: 0 X10E3/UL (ref 0–0.1)
IMM GRANULOCYTES NFR BLD AUTO: 0 %
LDLC SERPL CALC-MCNC: 84 MG/DL (ref 0–99)
LYMPHOCYTES # BLD AUTO: 2.7 X10E3/UL (ref 0.7–3.1)
LYMPHOCYTES NFR BLD AUTO: 31 %
MCH RBC QN AUTO: 30.4 PG (ref 26.6–33)
MCHC RBC AUTO-ENTMCNC: 33.4 G/DL (ref 31.5–35.7)
MCV RBC AUTO: 91 FL (ref 79–97)
MONOCYTES # BLD AUTO: 0.8 X10E3/UL (ref 0.1–0.9)
MONOCYTES NFR BLD AUTO: 9 %
NEUTROPHILS # BLD AUTO: 5 X10E3/UL (ref 1.4–7)
NEUTROPHILS NFR BLD AUTO: 58 %
PLATELET # BLD AUTO: 319 X10E3/UL (ref 150–450)
POTASSIUM SERPL-SCNC: 3.5 MMOL/L (ref 3.5–5.2)
PROT SERPL-MCNC: 7.6 G/DL (ref 6–8.5)
RBC # BLD AUTO: 4.71 X10E6/UL (ref 3.77–5.28)
SODIUM SERPL-SCNC: 139 MMOL/L (ref 134–144)
T4 FREE SERPL-MCNC: 0.97 NG/DL (ref 0.82–1.77)
TRIGL SERPL-MCNC: 90 MG/DL (ref 0–149)
TSH SERPL DL<=0.005 MIU/L-ACNC: 3.66 UIU/ML (ref 0.45–4.5)
VLDLC SERPL CALC-MCNC: 17 MG/DL (ref 5–40)
WBC # BLD AUTO: 8.8 X10E3/UL (ref 3.4–10.8)

## 2022-11-03 LAB — REF LAB TEST METHOD: NORMAL

## 2022-11-05 PROBLEM — K21.9 CHRONIC GERD: Status: ACTIVE | Noted: 2022-11-05

## 2022-11-05 NOTE — PROGRESS NOTES
Chief Complaint  Vomiting    Subjective         Grace Jacob presents to [facility]  History of Present Illness  Patient reports today secondary to having upset stomach for the past couple weeks.  Reports having nausea after eating, burning sensation in center of abdomen and states it is worse when she lays at night.  States she has vomitted.  Reports history of acid reflux states she discontinued her medication  Vomiting       Objective   Vital Signs:   There were no vitals taken for this visit.    Physical Exam   Pulmonary/Chest: Effort normal.   Psychiatric: She has a normal mood and affect.     Result Review :                 Assessment and Plan    Diagnoses and all orders for this visit:    1. Chronic GERD (Primary)  Assessment & Plan:  Patient advised to restart Omeprazole.  Discussed diet and behavior modifications for further relief.  Call if any problems arise        Follow Up   No follow-ups on file.  Patient was given instructions and counseling regarding her condition or for health maintenance advice. Please see specific information pulled into the AVS if appropriate.     Mode of Visit: Video  Location of patient: home  [place] [facility]  You have chosen to receive care through a telehealth visit.  Does the patient consent to use a video/audio connection for your medical care today? Yes  The visit included audio and video interaction. No technical issues occurred during this visit.

## 2022-11-08 ENCOUNTER — TELEPHONE (OUTPATIENT)
Dept: OBSTETRICS AND GYNECOLOGY | Facility: CLINIC | Age: 57
End: 2022-11-08

## 2022-11-08 ENCOUNTER — TELEPHONE (OUTPATIENT)
Dept: FAMILY MEDICINE CLINIC | Facility: CLINIC | Age: 57
End: 2022-11-08

## 2022-11-08 NOTE — TELEPHONE ENCOUNTER
Caller: Grace Jacob    Relationship: Self    Best call back number: [phone]    Caller requesting test results: SELF    What test was performed: LABS    When was the test performed: LAST WEEK    Where was the test performed: OB GYN OFFICE     Additional notes: WANTS TO GO OVER RESULTS WITH CANDIE, PLEASE CALL.

## 2022-11-08 NOTE — TELEPHONE ENCOUNTER
Pt stated she had received lab results on my chart and was concerned about some of the lab values. Pt requested to discuss with a nurse

## 2022-11-08 NOTE — TELEPHONE ENCOUNTER
Reviewed labs with patient. Will discuss f/u on elevated liver enzymes with OP. She vu.     Per OP plan to repeat in 3 months. Patient vu.

## 2022-12-08 ENCOUNTER — TELEMEDICINE (OUTPATIENT)
Dept: FAMILY MEDICINE CLINIC | Facility: TELEHEALTH | Age: 57
End: 2022-12-08

## 2022-12-08 DIAGNOSIS — R21 RASH AND NONSPECIFIC SKIN ERUPTION: Primary | ICD-10-CM

## 2022-12-08 PROCEDURE — 99213 OFFICE O/P EST LOW 20 MIN: CPT | Performed by: NURSE PRACTITIONER

## 2022-12-08 RX ORDER — METHYLPREDNISOLONE 4 MG/1
TABLET ORAL
Qty: 21 TABLET | Refills: 0 | Status: SHIPPED | OUTPATIENT
Start: 2022-12-08 | End: 2023-01-23

## 2022-12-08 RX ORDER — TRIAMCINOLONE ACETONIDE 1 MG/G
1 CREAM TOPICAL 2 TIMES DAILY
Qty: 45 G | Refills: 0 | Status: SHIPPED | OUTPATIENT
Start: 2022-12-08 | End: 2023-01-23

## 2022-12-08 NOTE — PROGRESS NOTES
You have chosen to receive care through a telehealth visit.  Do you consent to use a video/audio connection for your medical care today? Yes     CHIEF COMPLAINT  No chief complaint on file.        HPI  Grace Jacob is a 57 y.o. female  presents with complaint of 2-3 week history of rash on left forearm that is raised and itching.  She has tried tea tree oil, benadryl, and hydrocortisone.      Review of Systems   See HPI    Past Medical History:   Diagnosis Date   • Anxiety    • Arthritis     On celebrex; steroid injections q3 months in back   • Chronic pain     BILATERAL KNEE; on cymbalta.   • DDD (degenerative disc disease), lumbar     on celebrex   • Dysphagia     EGD 2021- s/p esophageal dilatation   • Frequent UTI    • Gallbladder problem    • Heartburn     chronic, episodic. Tums PRN. EGD 2021. No hx of h pylori   • Herpes    • HTN (hypertension)     previously on lisinopril and Toprol   • IBS (irritable bowel syndrome)    • Left knee pain    • STEPHANIE on CPAP    • PONV (postoperative nausea and vomiting)    • Pregnancy    • S/P laparoscopic cholecystectomy     gallstones   • Urinary incontinence        Family History   Problem Relation Age of Onset   • Alzheimer's disease Mother    • Diabetes Father    • Coronary artery disease Father    • Heart disease Father    • Cancer Brother    • Depression Brother    • Diabetes Brother    • BRCA 1/2 Neg Hx    • Breast cancer Neg Hx    • Colon cancer Neg Hx    • Endometrial cancer Neg Hx    • Ovarian cancer Neg Hx    • Malig Hyperthermia Neg Hx    • Uterine cancer Neg Hx        Social History     Socioeconomic History   • Marital status:    Tobacco Use   • Smoking status: Never   • Smokeless tobacco: Never   Vaping Use   • Vaping Use: Never used   Substance and Sexual Activity   • Alcohol use: No   • Drug use: No   • Sexual activity: Defer       Grace Jacob  reports that she has never smoked. She has never used smokeless tobacco..              There  were no vitals taken for this visit.    PHYSICAL EXAM  Physical Exam   Constitutional: She is oriented to person, place, and time. She appears well-developed and well-nourished. She does not have a sickly appearance. She does not appear ill.   HENT:   Head: Normocephalic and atraumatic.   Pulmonary/Chest: Effort normal.  No respiratory distress.  Neurological: She is alert and oriented to person, place, and time.   Skin:   Small patch of erythematous papules on top of left forearm         Diagnoses and all orders for this visit:    1. Rash and nonspecific skin eruption (Primary)  -     methylPREDNISolone (MEDROL) 4 MG dose pack; Take as directed on package instructions.  Dispense: 21 tablet; Refill: 0  -     triamcinolone (KENALOG) 0.1 % cream; Apply 1 application topically to the appropriate area as directed 2 (Two) Times a Day.  Dispense: 45 g; Refill: 0    --take medications as prescribed  --frequent hand washing if touching the area of rash  --f/u in 7-10 days if no improvement        FOLLOW-UP  As discussed during visit with PCP/[facility] if no improvement or Urgent Care/Emergency Department if worsening of symptoms    Patient verbalizes understanding of medication dosage, comfort measures, instructions for treatment and follow-up.    Sasha Meléndez, TING  12/08/2022  10:40 EST    The use of a video visit has been reviewed with the patient and verbal informed consent has been obtained. Myself and Grace Jacob participated in this visit. The patient is located in [address] [place] [address] [place] [address].    I am located in [place], [place]. Mychart and Zoom were utilized. I spent 8 minutes in the patient's chart for this visit.

## 2022-12-14 ENCOUNTER — TELEPHONE (OUTPATIENT)
Dept: FAMILY MEDICINE CLINIC | Facility: CLINIC | Age: 57
End: 2022-12-14

## 2022-12-14 NOTE — TELEPHONE ENCOUNTER
Patient called back regarding orders. She said that [facility] would not take her out of state orders.  So she needs orders from Tiffanie Dickson. She said that she's trying to get physical therapy in before the end of the year. She would like a call back ph: [phone]

## 2022-12-14 NOTE — TELEPHONE ENCOUNTER
Caller: Grace Jacob    Relationship: Self    Best call back number: [phone]    What is the best time to reach you: ANYTIME    Who are you requesting to speak with (clinical staff, provider,  specific staff member): CLINICAL    What was the call regarding:PATIENT HAS AN OUT OF STATE ORDER FOR  PT FOR SOMEONE WHO SPECIALIZES IN LIPEDEMA AND SHE IS WANTING TO KNOW IF CANDIE FONTENOT CAN SEND OVER THE ORDER INSTEAD. PLEASE REACH OUT AND ADVISE.    Do you require a callback: YES

## 2023-01-23 ENCOUNTER — TELEPHONE (OUTPATIENT)
Dept: FAMILY MEDICINE CLINIC | Facility: CLINIC | Age: 58
End: 2023-01-23

## 2023-01-23 ENCOUNTER — OFFICE VISIT (OUTPATIENT)
Dept: FAMILY MEDICINE CLINIC | Facility: CLINIC | Age: 58
End: 2023-01-23
Payer: COMMERCIAL

## 2023-01-23 VITALS
SYSTOLIC BLOOD PRESSURE: 144 MMHG | OXYGEN SATURATION: 97 % | BODY MASS INDEX: 41.91 KG/M2 | DIASTOLIC BLOOD PRESSURE: 88 MMHG | HEART RATE: 84 BPM | HEIGHT: 67 IN | WEIGHT: 267 LBS

## 2023-01-23 DIAGNOSIS — M19.90 CHRONIC ARTHRITIS: ICD-10-CM

## 2023-01-23 DIAGNOSIS — Z00.00 PHYSICAL EXAM: Primary | ICD-10-CM

## 2023-01-23 DIAGNOSIS — R60.9 LIPEDEMA: ICD-10-CM

## 2023-01-23 DIAGNOSIS — R74.8 ELEVATED LIVER ENZYMES: ICD-10-CM

## 2023-01-23 DIAGNOSIS — I89.0 LYMPHEDEMA ASSOCIATED WITH OBESITY: ICD-10-CM

## 2023-01-23 DIAGNOSIS — I10 PRIMARY HYPERTENSION: ICD-10-CM

## 2023-01-23 DIAGNOSIS — F32.A MILD DEPRESSION: ICD-10-CM

## 2023-01-23 DIAGNOSIS — R03.0 ELEVATED BLOOD PRESSURE READING: ICD-10-CM

## 2023-01-23 DIAGNOSIS — R73.03 PREDIABETES: ICD-10-CM

## 2023-01-23 DIAGNOSIS — E66.9 LYMPHEDEMA ASSOCIATED WITH OBESITY: ICD-10-CM

## 2023-01-23 DIAGNOSIS — B37.2 CANDIDAL INTERTRIGO: ICD-10-CM

## 2023-01-23 PROCEDURE — 36415 COLL VENOUS BLD VENIPUNCTURE: CPT | Performed by: PHYSICIAN ASSISTANT

## 2023-01-23 PROCEDURE — 99396 PREV VISIT EST AGE 40-64: CPT | Performed by: PHYSICIAN ASSISTANT

## 2023-01-23 PROCEDURE — 99213 OFFICE O/P EST LOW 20 MIN: CPT | Performed by: PHYSICIAN ASSISTANT

## 2023-01-23 RX ORDER — FLUCONAZOLE 150 MG/1
150 TABLET ORAL ONCE
Qty: 1 TABLET | Refills: 0 | Status: SHIPPED | OUTPATIENT
Start: 2023-01-23 | End: 2023-01-23

## 2023-01-23 RX ORDER — DULOXETIN HYDROCHLORIDE 60 MG/1
60 CAPSULE, DELAYED RELEASE ORAL NIGHTLY
Qty: 90 CAPSULE | Refills: 1 | Status: SHIPPED | OUTPATIENT
Start: 2023-01-23

## 2023-01-23 RX ORDER — MELOXICAM 15 MG/1
15 TABLET ORAL DAILY
Qty: 30 TABLET | Refills: 1 | Status: SHIPPED | OUTPATIENT
Start: 2023-01-23

## 2023-01-23 NOTE — TELEPHONE ENCOUNTER
nystatin-triamcinolone (MYCOLOG II) 373588-0.1 UNIT/GM-% cream WAS $103.  IS THERE SOMETHING DIFFERENT THAT CAN BE PRESCRIBED?    THERE WAS ALSO SUPPOSE TO BE A PRESCRIPTION FOR MOUNJARO WHICH HAS NOT BEEN SENT.     PLEASE CALL [phone]

## 2023-01-24 LAB
ALBUMIN SERPL-MCNC: 4.5 G/DL (ref 3.8–4.9)
ALBUMIN/GLOB SERPL: 1.7 {RATIO} (ref 1.2–2.2)
ALP SERPL-CCNC: 110 IU/L (ref 44–121)
ALT SERPL-CCNC: 17 IU/L (ref 0–32)
AST SERPL-CCNC: 20 IU/L (ref 0–40)
BILIRUB SERPL-MCNC: 0.4 MG/DL (ref 0–1.2)
BUN SERPL-MCNC: 12 MG/DL (ref 6–24)
BUN/CREAT SERPL: 17 (ref 9–23)
CALCIUM SERPL-MCNC: 9.6 MG/DL (ref 8.7–10.2)
CHLORIDE SERPL-SCNC: 102 MMOL/L (ref 96–106)
CO2 SERPL-SCNC: 25 MMOL/L (ref 20–29)
CREAT SERPL-MCNC: 0.69 MG/DL (ref 0.57–1)
EGFRCR SERPLBLD CKD-EPI 2021: 101 ML/MIN/1.73
GLOBULIN SER CALC-MCNC: 2.7 G/DL (ref 1.5–4.5)
GLUCOSE SERPL-MCNC: 90 MG/DL (ref 70–99)
HBA1C MFR BLD: 6.3 % (ref 4.8–5.6)
POTASSIUM SERPL-SCNC: 5.2 MMOL/L (ref 3.5–5.2)
PROT SERPL-MCNC: 7.2 G/DL (ref 6–8.5)
SODIUM SERPL-SCNC: 139 MMOL/L (ref 134–144)

## 2023-01-24 NOTE — TELEPHONE ENCOUNTER
Caller: Grace Jacob    Relationship to patient: Self    Best call back number:    [phone]     What is the call regarding:  PATIENT IS CALLING AGAIN TO SEE IF HER MEDICATION CAN BE CALLED IN.

## 2023-01-25 RX ORDER — TIRZEPATIDE 2.5 MG/.5ML
INJECTION, SOLUTION SUBCUTANEOUS
Qty: 16 ML | Refills: 0 | Status: SHIPPED | OUTPATIENT
Start: 2023-01-25 | End: 2023-05-25

## 2023-01-25 NOTE — TELEPHONE ENCOUNTER
Caller: Grace Jacob    Relationship: Self    Best call back number: [phone]    What is the best time to reach you: ANY    Who are you requesting to speak with (clinical staff, provider,  specific staff member): CLINICAL    Do you know the name of the person who called: WAITING FOR CALL BACK    What was the call regarding: WAITING FOR THE MOUNJARO PRESCRIPTION. A TOTAL OF 5 MEDICATIONS WERE TO BE CALLED IN. NEEDS THE CREAM.    Do you require a callback: PLEASE CALL TO LET PATIENT KNOW THE STATUS OF THE MEDICATION REFILLS.    [id number] - [place], [place] - [address]  & [address] - [phone] [facility] [phone] FX

## 2023-01-26 ENCOUNTER — TELEPHONE (OUTPATIENT)
Dept: FAMILY MEDICINE CLINIC | Facility: CLINIC | Age: 58
End: 2023-01-26

## 2023-01-26 DIAGNOSIS — B37.2 CANDIDAL INTERTRIGO: Primary | ICD-10-CM

## 2023-01-26 RX ORDER — NYSTATIN 100000 U/G
1 CREAM TOPICAL 2 TIMES DAILY
Qty: 30 G | Refills: 10 | Status: SHIPPED | OUTPATIENT
Start: 2023-01-26

## 2023-01-26 RX ORDER — TRIAMCINOLONE ACETONIDE 1 MG/G
1 CREAM TOPICAL 2 TIMES DAILY
Qty: 30 G | Refills: 10 | Status: SHIPPED | OUTPATIENT
Start: 2023-01-26

## 2023-01-26 NOTE — TELEPHONE ENCOUNTER
JAEL MESSAGE FROM PATIENT:    I also need a note for a ergonomic (however you spell it) chair please!!  I am having trouble with a chair here at work.  We bought some cheap ones and it’s awful, deep in the back.  So if you could say I need it for my joint issues or whatever that would be great!  Would like to pick it up before Friday!!!  Thank you so much!!    HER BOSS IS ASKING FOR THE LETTER.  WILL  WHEN READY.     PLEASE CALL [phone]

## 2023-01-27 ENCOUNTER — TELEPHONE (OUTPATIENT)
Dept: FAMILY MEDICINE CLINIC | Facility: CLINIC | Age: 58
End: 2023-01-27
Payer: COMMERCIAL

## 2023-01-27 NOTE — TELEPHONE ENCOUNTER
Patient called back for an update on the prior authorization for Mounjaro.  She said the pharmacy has not received a PA.  She would like a call back today with an update. Ph: [phone]. She said that she's been trying to get this done for a few days.

## 2023-01-28 NOTE — ASSESSMENT & PLAN NOTE
We will check A1c.  Patient will start Miguel for possible improvement of glucose levels and weight loss.  She will return to clinic in 6 weeks.  Patient denies any family history of thyroid cancer denies any pancreatitis

## 2023-01-28 NOTE — ASSESSMENT & PLAN NOTE
Patient's (Body mass index is 41.82 kg/m².) indicates that they are morbidly/severely obese (BMI > 40 or > 35 with obesity - related health condition) with health conditions that include osteoarthritis . Weight is worsening. BMI is is above average; BMI management plan is completed. We discussed portion control, increasing exercise and pharmacologic options including mounjoro.

## 2023-01-28 NOTE — ASSESSMENT & PLAN NOTE
Hypertension is worsening.  Provided patient with log and advised her to start checking her blood pressure daily she may need to restart previously prescribed medications.  Patient knowledge understanding

## 2023-01-28 NOTE — PROGRESS NOTES
"Chief Complaint  Annual Exam (Patient is fasting for labs.)    Subjective          Grace Jacob presents to [facility] PRIMARY CARE  History of Present Illness  Patient reports today for physical exam and fasting blood work.    Patient reports having chronic arthritis and pain from previous joint replacement surgeries.  Patient states she continues meloxicam and duloxetine daily.  Patient also reports duloxetine helps with her mood.    Patient reports having lymphedema and lip edema states she has been to a specialist in [place].  Reports he is requesting her to have physical therapy however her order is not being accepted secondary to him being out of state.  Patient requests an order for PT this date.    Patient reports having prediabetes states she has not modified her diet.  States she took Saxenda in the past and it helps to lower her glucose and weight loss.  She would like to restart a similar medication she is research and request Mounjaro.  Patient reports no family history of thyroid cancer.      Objective   Vital Signs:   /88 (BP Location: Right arm, Patient Position: Sitting, Cuff Size: Adult)   Pulse 84   Ht 170.2 cm (67\")   Wt 121 kg (267 lb)   SpO2 97%   BMI 41.82 kg/m²     Body mass index is 41.82 kg/m².    Review of Systems    Health Maintenance   Topic Date Due   • COVID-19 Vaccine (1) Never done   • TDAP/TD VACCINES (1 - Tdap) Never done   • ZOSTER VACCINE (1 of 2) Never done   • HEPATITIS C SCREENING  Never done   • ANNUAL PHYSICAL  Never done   • INFLUENZA VACCINE  08/01/2022   • MAMMOGRAM  08/24/2023   • PAP SMEAR  11/01/2025   • COLORECTAL CANCER SCREENING  11/16/2025   • Pneumococcal Vaccine 0-64  Aged Out        Past History:  Medical History: has a past medical history of Anxiety, Arthritis, Chronic pain, DDD (degenerative disc disease), lumbar, Dysphagia, Frequent UTI, Gallbladder problem, Heartburn, Herpes, HTN (hypertension), IBS (irritable bowel " syndrome), Left knee pain, STEPHANIE on CPAP, PONV (postoperative nausea and vomiting), Pregnancy, S/P laparoscopic cholecystectomy, and Urinary incontinence.   Surgical History: has a past surgical history that includes Laparoscopic cholecystectomy (1994); Total knee arthroplasty; Total hip arthroplasty (Right, 10/19/2018); Hip Arthroplasty (Left, 2020); and Total Knee Arthroplasty Revision (Left, 04/06/2021).   Family History: family history includes Alzheimer's disease in her mother; Cancer in her brother; Coronary artery disease in her father; Depression in her brother; Diabetes in her brother and father; Heart disease in her father.   Social History: reports that she has never smoked. She has never used smokeless tobacco. She reports that she does not drink alcohol and does not use drugs.      Current Outpatient Medications:   •  DULoxetine (CYMBALTA) 60 MG capsule, Take 1 capsule by mouth Every Night. For mood and pain, Disp: 90 capsule, Rfl: 1  •  Glucosamine HCl (GLUCOSAMINE PO), Take 2 tablets by mouth Daily., Disp: , Rfl:   •  valACYclovir (VALTREX) 500 MG tablet, TAKE 1 TABLET BY MOUTH DAILY, Disp: 30 tablet, Rfl: 8  •  meloxicam (MOBIC) 15 MG tablet, Take 1 tablet by mouth Daily. With food for joint pain, Disp: 30 tablet, Rfl: 1  •  nystatin (MYCOSTATIN) 057775 UNIT/GM cream, Apply 1 application topically to the appropriate area as directed 2 (Two) Times a Day., Disp: 30 g, Rfl: 10  •  nystatin-triamcinolone (MYCOLOG II) 729676-4.1 UNIT/GM-% cream, Apply 1 application topically to the appropriate area as directed 2 (Two) Times a Day., Disp: 30 g, Rfl: 5  •  Tirzepatide (Mounjaro) 2.5 MG/0.5ML solution pen-injector, Inject 2.5 mg under the skin into the appropriate area as directed 1 (One) Time Per Week for 30 days, THEN 5 mg 1 (One) Time Per Week for 30 days, THEN 7.5 mg 1 (One) Time Per Week for 60 days., Disp: 16 mL, Rfl: 0  •  triamcinolone (KENALOG) 0.1 % cream, Apply 1 application topically to the  appropriate area as directed 2 (Two) Times a Day., Disp: 30 g, Rfl: 10    Allergies: Penicillins    Physical Exam  Vitals and nursing note reviewed.   Constitutional:       General: She is not in acute distress.     Appearance: She is obese. She is not ill-appearing.   HENT:      Head: Normocephalic.      Right Ear: Tympanic membrane, ear canal and external ear normal.      Left Ear: Tympanic membrane, ear canal and external ear normal.      Nose: Nose normal.      Mouth/Throat:      Mouth: Mucous membranes are moist.   Eyes:      Pupils: Pupils are equal, round, and reactive to light.   Cardiovascular:      Rate and Rhythm: Normal rate and regular rhythm.      Pulses: Normal pulses.   Pulmonary:      Effort: Pulmonary effort is normal. No respiratory distress.   Abdominal:      General: Bowel sounds are normal.      Palpations: Abdomen is soft.      Tenderness: There is no abdominal tenderness. There is no guarding or rebound.   Musculoskeletal:         General: Normal range of motion.      Cervical back: Normal range of motion.   Skin:     General: Skin is warm and dry.      Capillary Refill: Capillary refill takes less than 2 seconds.   Neurological:      General: No focal deficit present.      Mental Status: She is oriented to person, place, and time.   Psychiatric:         Mood and Affect: Mood normal.          Result Review :                   Assessment and Plan    Diagnoses and all orders for this visit:    1. Physical exam (Primary)  Assessment & Plan:  Discussed injury prevention, diet and exercise, safe sexual practices, and screening for common diseases. Encouraged use of sunscreen and seatbelts. Encouraged SBE, avoidance of tobacco, limiting alcohol, and yearly dental and eye exams.         2. Prediabetes  Assessment & Plan:  We will check A1c.  Patient will start Miguel for possible improvement of glucose levels and weight loss.  She will return to clinic in 6 weeks.  Patient denies any family  history of thyroid cancer denies any pancreatitis    Orders:  -     Hemoglobin A1c; Future  -     Hemoglobin A1c  -     Tirzepatide (Mounjaro) 2.5 MG/0.5ML solution pen-injector; Inject 2.5 mg under the skin into the appropriate area as directed 1 (One) Time Per Week for 30 days, THEN 5 mg 1 (One) Time Per Week for 30 days, THEN 7.5 mg 1 (One) Time Per Week for 60 days.  Dispense: 16 mL; Refill: 0    3. Mild depression  Assessment & Plan:  Patient's depression is recurrent and is mild without psychosis. Their depression is currently in partial remission and the condition is improving with treatment. This will be reassessed at the next regular appointment. F/U as described:patient will continue current medication therapy.    Orders:  -     DULoxetine (CYMBALTA) 60 MG capsule; Take 1 capsule by mouth Every Night. For mood and pain  Dispense: 90 capsule; Refill: 1    4. Chronic arthritis  Assessment & Plan:  Patient will continue meloxicam and duloxetine    Orders:  -     DULoxetine (CYMBALTA) 60 MG capsule; Take 1 capsule by mouth Every Night. For mood and pain  Dispense: 90 capsule; Refill: 1  -     meloxicam (MOBIC) 15 MG tablet; Take 1 tablet by mouth Daily. With food for joint pain  Dispense: 30 tablet; Refill: 1    5. Candidal intertrigo  Assessment & Plan:  Patient prescribed Diflucan, nystatin triamcinolone cream    Orders:  -     fluconazole (Diflucan) 150 MG tablet; Take 1 tablet by mouth 1 (One) Time for 1 dose.  Dispense: 1 tablet; Refill: 0  -     nystatin-triamcinolone (MYCOLOG II) 676154-0.1 UNIT/GM-% cream; Apply 1 application topically to the appropriate area as directed 2 (Two) Times a Day.  Dispense: 30 g; Refill: 5    6. Elevated liver enzymes  Assessment & Plan:  Patient will get blood work today    Orders:  -     Comprehensive Metabolic Panel; Future  -     Comprehensive Metabolic Panel    7. Lipedema  Assessment & Plan:  Provided patient with referral to physical therapy.      8. Lymphedema  associated with obesity  Assessment & Plan:  Patient's (Body mass index is 41.82 kg/m².) indicates that they are morbidly/severely obese (BMI > 40 or > 35 with obesity - related health condition) with health conditions that include osteoarthritis . Weight is worsening. BMI is is above average; BMI management plan is completed. We discussed portion control, increasing exercise and pharmacologic options including mounjoro.       9. Elevated blood pressure reading  Assessment & Plan:  See plan above      10. Primary hypertension  Assessment & Plan:  Hypertension is worsening.  Provided patient with log and advised her to start checking her blood pressure daily she may need to restart previously prescribed medications.  Patient knowledge understanding          Follow Up   No follow-ups on file.  Patient was given instructions and counseling regarding her condition or for health maintenance advice. Please see specific information pulled into the AVS if appropriate.     Tiffanie Dickson PA-C

## 2023-01-31 ENCOUNTER — TELEPHONE (OUTPATIENT)
Dept: FAMILY MEDICINE CLINIC | Facility: CLINIC | Age: 58
End: 2023-01-31

## 2023-01-31 NOTE — TELEPHONE ENCOUNTER
Caller: Grace Jacob    Relationship: Self    Best call back number: [phone]    What is the best time to reach you: ANYTIME     Who are you requesting to speak with (clinical staff, provider,  specific staff member): CLINICAL     What was the call regarding: PATIENT IS CALLING IN ABOUT THE STATUS OF HER PA FOR MOUNJARO, SHE STATES NO ONE HAS RECEIVED ANYTHING BUT WAS TOLD BY THE OFFICE THAT IS WAS SENT IN. SHE IS WANTING MORE INFORMATION ON IT. PLEASE REACH OUT AND ADVISE.     Do you require a callback: YES

## 2023-02-03 ENCOUNTER — TELEPHONE (OUTPATIENT)
Dept: FAMILY MEDICINE CLINIC | Facility: CLINIC | Age: 58
End: 2023-02-03

## 2023-02-03 NOTE — TELEPHONE ENCOUNTER
Caller: Grace Jacob    Relationship: Self    Best call back number: [phone]    Who are you requesting to speak with (clinical staff, provider,  specific staff member): CANDIE FONTENOT    Do you know the name of the person who called: GRACE    What was the call regarding: PRIOR AUTHORIZATION ISSUE, PATIENT ADVISED SHE SENT A MESSAGE IN [employer] REGARDING THIS. PATIENT'S INSURANCE NEEDS A PA FOR Tirzepatide (Mounjaro) 2.5 MG/0.5ML solution pen-injector

## 2023-02-08 NOTE — TELEPHONE ENCOUNTER
PATIENT CHECKING STATUS  ON THE  PRIOR AUTHORIZATION, PATIENT STATES  NEEDS LAB WORK AND OTHER MEDICATIONS USED, SUCH AS SAXENDA OR ANY STEP UP MEDS

## 2023-02-10 RX ORDER — VALACYCLOVIR HYDROCHLORIDE 500 MG/1
500 TABLET, FILM COATED ORAL DAILY
Qty: 30 TABLET | Refills: 8 | Status: SHIPPED | OUTPATIENT
Start: 2023-02-10 | End: 2023-02-13 | Stop reason: SDUPTHER

## 2023-02-13 ENCOUNTER — TELEPHONE (OUTPATIENT)
Dept: OBSTETRICS AND GYNECOLOGY | Facility: CLINIC | Age: 58
End: 2023-02-13
Payer: COMMERCIAL

## 2023-02-13 ENCOUNTER — TELEPHONE (OUTPATIENT)
Dept: FAMILY MEDICINE CLINIC | Facility: CLINIC | Age: 58
End: 2023-02-13

## 2023-02-13 DIAGNOSIS — I10 PRIMARY HYPERTENSION: Primary | ICD-10-CM

## 2023-02-13 RX ORDER — VALACYCLOVIR HYDROCHLORIDE 500 MG/1
500 TABLET, FILM COATED ORAL DAILY
Qty: 90 TABLET | Refills: 3 | Status: SHIPPED | OUTPATIENT
Start: 2023-02-13

## 2023-02-13 RX ORDER — METOPROLOL SUCCINATE 25 MG/1
25 TABLET, EXTENDED RELEASE ORAL DAILY
Qty: 30 TABLET | Refills: 1 | Status: SHIPPED | OUTPATIENT
Start: 2023-02-13

## 2023-02-13 NOTE — TELEPHONE ENCOUNTER
[facility] faxed a request for 90 day supply of Valacyclovir tab 500mg Her annual is 11/2/2023

## 2023-02-13 NOTE — TELEPHONE ENCOUNTER
PATIENT SAID SHE HAS BEEN MONITORING  HER BLOOD PRESSURE SINCE JANUARY 23     SHE HAD TO LEAVE WORK BECAUSE OF A BAD HEADACHE AND HER BLOOD PRESSURE HAS BEEN HIGH  TODAY IT IS   167/110    SHE DIDN'T KNOW IF SOMETHING COULD BE CALLED IN   [facility]     PATIENT CALL BACK [phone]

## 2023-02-14 ENCOUNTER — TELEMEDICINE (OUTPATIENT)
Dept: FAMILY MEDICINE CLINIC | Facility: CLINIC | Age: 58
End: 2023-02-14
Payer: COMMERCIAL

## 2023-02-14 DIAGNOSIS — I10 PRIMARY HYPERTENSION: Primary | ICD-10-CM

## 2023-02-14 DIAGNOSIS — K21.9 CHRONIC GERD: ICD-10-CM

## 2023-02-14 PROCEDURE — 99214 OFFICE O/P EST MOD 30 MIN: CPT | Performed by: PHYSICIAN ASSISTANT

## 2023-02-14 RX ORDER — AMLODIPINE BESYLATE 5 MG/1
5 TABLET ORAL DAILY
Qty: 30 TABLET | Refills: 1 | Status: SHIPPED | OUTPATIENT
Start: 2023-02-14

## 2023-02-15 NOTE — PROGRESS NOTES
"Chief Complaint  Headache and Hypertension    Subjective         Grace Jacob presents to [facility]  History of Present Illness  Patient reports today secondary to having a headache for the past 2 days.  Patient reports leaving work early yesterday secondary to headache and then discovered her blood pressure was around 180/100.  Patient states she took an old prescription of metoprolol and it did go down however reports today she continues to have the headache.  Reports checking her blood pressure today and it did improve to around 150/90.  Patient states she went to  metoprolol that was sent in yesterday however she was advised that she needs a prior authorization.  Patient would like a medication to take while she is waiting for the prior authorization.  Patient has a history of hypertension however states she discontinued the medication on her own few years ago    Patient reports she continues to have nausea after she eats and vomiting in the mornings.  Patient reports she has not taken any medication regularly for this.  Reports acid reflux in the past and has omeprazole at home.  Headache  Hypertension  Associated symptoms include headaches.     Objective   Vital Signs:   There were no vitals taken for this visit.    Estimated body mass index is 41.82 kg/m² as calculated from the following:    Height as of 1/23/23: 170.2 cm (67\").    Weight as of 1/23/23: 121 kg (267 lb).     Physical Exam   Pulmonary/Chest: Effort normal.   Psychiatric: She has a normal mood and affect.     Result Review :                 Assessment and Plan    Diagnoses and all orders for this visit:    1. Primary hypertension (Primary)  Assessment & Plan:  Patient prescribed amlodipine 5 mg advised her to continue to monitor blood pressure.  Also continue to take old prescription of metoprolol while we are waiting on prior authorization from her insurance.  Discussed signs of worsening symptoms " and advise ER should they occur.  Call if any problems arise.  Patient return to clinic for follow-up in 2 to 3 weeks.  Advised patient to bring her blood pressure log at that time.    Orders:  -     amLODIPine (NORVASC) 5 MG tablet; Take 1 tablet by mouth Daily. For blood pressue  Dispense: 30 tablet; Refill: 1    2. Chronic GERD  Assessment & Plan:  Again advised patient to restart omeprazole.  Patient's symptoms appear to be consistent with acid reflux.  Recommended she restart medication for the next 2 to 3 weeks discussed diet modifications as well.  Call if any problems arise        Follow Up   No follow-ups on file.  Patient was given instructions and counseling regarding her condition or for health maintenance advice. Please see specific information pulled into the AVS if appropriate.     Mode of Visit: Video  [place] of patient[place]  [place] [facility] [place]  You have chosen to receive care through a telehealth visit.  The patient has signed the video visit consent form.  The visit included audio and video interaction. No technical issues occurred during this visit.

## 2023-02-15 NOTE — ASSESSMENT & PLAN NOTE
Again advised patient to restart omeprazole.  Patient's symptoms appear to be consistent with acid reflux.  Recommended she restart medication for the next 2 to 3 weeks discussed diet modifications as well.  Call if any problems arise

## 2023-02-15 NOTE — ASSESSMENT & PLAN NOTE
Patient prescribed amlodipine 5 mg advised her to continue to monitor blood pressure.  Also continue to take old prescription of metoprolol while we are waiting on prior authorization from her insurance.  Discussed signs of worsening symptoms and advise ER should they occur.  Call if any problems arise.  Patient return to clinic for follow-up in 2 to 3 weeks.  Advised patient to bring her blood pressure log at that time.

## 2023-02-16 ENCOUNTER — APPOINTMENT (OUTPATIENT)
Dept: GENERAL RADIOLOGY | Facility: HOSPITAL | Age: 58
End: 2023-02-16
Payer: COMMERCIAL

## 2023-02-16 ENCOUNTER — HOSPITAL ENCOUNTER (EMERGENCY)
Facility: HOSPITAL | Age: 58
Discharge: HOME | End: 2023-02-16
Attending: EMERGENCY MEDICINE | Admitting: EMERGENCY MEDICINE
Payer: COMMERCIAL

## 2023-02-16 ENCOUNTER — TELEPHONE (OUTPATIENT)
Dept: FAMILY MEDICINE CLINIC | Facility: CLINIC | Age: 58
End: 2023-02-16
Payer: COMMERCIAL

## 2023-02-16 VITALS
HEIGHT: 67 IN | BODY MASS INDEX: 41.91 KG/M2 | SYSTOLIC BLOOD PRESSURE: 141 MMHG | DIASTOLIC BLOOD PRESSURE: 85 MMHG | HEART RATE: 81 BPM | OXYGEN SATURATION: 100 % | WEIGHT: 267 LBS | RESPIRATION RATE: 18 BRPM | TEMPERATURE: 99 F

## 2023-02-16 DIAGNOSIS — R00.2 PALPITATIONS: ICD-10-CM

## 2023-02-16 DIAGNOSIS — R53.83 FATIGUE, UNSPECIFIED TYPE: Primary | ICD-10-CM

## 2023-02-16 DIAGNOSIS — Z91.89: ICD-10-CM

## 2023-02-16 DIAGNOSIS — I10 ELEVATED BLOOD PRESSURE READING WITH DIAGNOSIS OF HYPERTENSION: ICD-10-CM

## 2023-02-16 LAB
ALBUMIN SERPL-MCNC: 4.3 G/DL (ref 3.5–5.2)
ALBUMIN/GLOB SERPL: 1.2 G/DL
ALP SERPL-CCNC: 118 U/L (ref 39–117)
ALT SERPL W P-5'-P-CCNC: 21 U/L (ref 1–33)
ANION GAP SERPL CALCULATED.3IONS-SCNC: 9 MMOL/L (ref 5–15)
AST SERPL-CCNC: 20 U/L (ref 1–32)
BACTERIA UR QL AUTO: ABNORMAL /HPF
BASOPHILS # BLD AUTO: 0.07 10*3/MM3 (ref 0–0.2)
BASOPHILS NFR BLD AUTO: 0.6 % (ref 0–1.5)
BILIRUB SERPL-MCNC: 0.3 MG/DL (ref 0–1.2)
BILIRUB UR QL STRIP: NEGATIVE
BUN SERPL-MCNC: 17 MG/DL (ref 6–20)
BUN/CREAT SERPL: 18.5 (ref 7–25)
CALCIUM SPEC-SCNC: 9.4 MG/DL (ref 8.6–10.5)
CHLORIDE SERPL-SCNC: 103 MMOL/L (ref 98–107)
CLARITY UR: CLEAR
CO2 SERPL-SCNC: 25 MMOL/L (ref 22–29)
COLOR UR: YELLOW
CREAT SERPL-MCNC: 0.92 MG/DL (ref 0.57–1)
DEPRECATED RDW RBC AUTO: 47.1 FL (ref 37–54)
EGFRCR SERPLBLD CKD-EPI 2021: 72.3 ML/MIN/1.73
EOSINOPHIL # BLD AUTO: 0.29 10*3/MM3 (ref 0–0.4)
EOSINOPHIL NFR BLD AUTO: 2.4 % (ref 0.3–6.2)
ERYTHROCYTE [DISTWIDTH] IN BLOOD BY AUTOMATED COUNT: 13.4 % (ref 12.3–15.4)
FLUAV RNA RESP QL NAA+PROBE: NOT DETECTED
FLUBV RNA RESP QL NAA+PROBE: NOT DETECTED
GEN 5 2HR TROPONIN T REFLEX: 9 NG/L
GLOBULIN UR ELPH-MCNC: 3.7 GM/DL
GLUCOSE SERPL-MCNC: 125 MG/DL (ref 65–99)
GLUCOSE UR STRIP-MCNC: NEGATIVE MG/DL
HCT VFR BLD AUTO: 45.3 % (ref 34–46.6)
HGB BLD-MCNC: 15 G/DL (ref 12–15.9)
HGB UR QL STRIP.AUTO: NEGATIVE
HOLD SPECIMEN: NORMAL
HYALINE CASTS UR QL AUTO: ABNORMAL /LPF
IMM GRANULOCYTES # BLD AUTO: 0.02 10*3/MM3 (ref 0–0.05)
IMM GRANULOCYTES NFR BLD AUTO: 0.2 % (ref 0–0.5)
KETONES UR QL STRIP: NEGATIVE
LEUKOCYTE ESTERASE UR QL STRIP.AUTO: ABNORMAL
LYMPHOCYTES # BLD AUTO: 3.39 10*3/MM3 (ref 0.7–3.1)
LYMPHOCYTES NFR BLD AUTO: 28.6 % (ref 19.6–45.3)
MAGNESIUM SERPL-MCNC: 2.1 MG/DL (ref 1.6–2.6)
MCH RBC QN AUTO: 31.3 PG (ref 26.6–33)
MCHC RBC AUTO-ENTMCNC: 33.1 G/DL (ref 31.5–35.7)
MCV RBC AUTO: 94.4 FL (ref 79–97)
MONOCYTES # BLD AUTO: 0.97 10*3/MM3 (ref 0.1–0.9)
MONOCYTES NFR BLD AUTO: 8.2 % (ref 5–12)
NEUTROPHILS NFR BLD AUTO: 60 % (ref 42.7–76)
NEUTROPHILS NFR BLD AUTO: 7.12 10*3/MM3 (ref 1.7–7)
NITRITE UR QL STRIP: NEGATIVE
NRBC BLD AUTO-RTO: 0 /100 WBC (ref 0–0.2)
PH UR STRIP.AUTO: <=5 [PH] (ref 5–8)
PLATELET # BLD AUTO: 348 10*3/MM3 (ref 140–450)
PMV BLD AUTO: 9.1 FL (ref 6–12)
POTASSIUM SERPL-SCNC: 4.1 MMOL/L (ref 3.5–5.2)
PROT SERPL-MCNC: 8 G/DL (ref 6–8.5)
PROT UR QL STRIP: NEGATIVE
QT INTERVAL: 350 MS
QTC INTERVAL: 430 MS
RBC # BLD AUTO: 4.8 10*6/MM3 (ref 3.77–5.28)
RBC # UR STRIP: ABNORMAL /HPF
REF LAB TEST METHOD: ABNORMAL
RSV RNA NPH QL NAA+NON-PROBE: NOT DETECTED
SARS-COV-2 RNA RESP QL NAA+PROBE: NOT DETECTED
SODIUM SERPL-SCNC: 137 MMOL/L (ref 136–145)
SP GR UR STRIP: 1.02 (ref 1–1.03)
SQUAMOUS #/AREA URNS HPF: ABNORMAL /HPF
TROPONIN T DELTA: -1 NG/L
TROPONIN T SERPL HS-MCNC: 10 NG/L
UROBILINOGEN UR QL STRIP: ABNORMAL
WBC # UR STRIP: ABNORMAL /HPF
WBC NRBC COR # BLD: 11.86 10*3/MM3 (ref 3.4–10.8)
WHOLE BLOOD HOLD COAG: NORMAL
WHOLE BLOOD HOLD SPECIMEN: NORMAL

## 2023-02-16 PROCEDURE — 83735 ASSAY OF MAGNESIUM: CPT

## 2023-02-16 PROCEDURE — 80053 COMPREHEN METABOLIC PANEL: CPT

## 2023-02-16 PROCEDURE — 81001 URINALYSIS AUTO W/SCOPE: CPT | Performed by: EMERGENCY MEDICINE

## 2023-02-16 PROCEDURE — 93005 ELECTROCARDIOGRAM TRACING: CPT

## 2023-02-16 PROCEDURE — 84484 ASSAY OF TROPONIN QUANT: CPT

## 2023-02-16 PROCEDURE — 36415 COLL VENOUS BLD VENIPUNCTURE: CPT

## 2023-02-16 PROCEDURE — 99284 EMERGENCY DEPT VISIT MOD MDM: CPT

## 2023-02-16 PROCEDURE — 84484 ASSAY OF TROPONIN QUANT: CPT | Performed by: EMERGENCY MEDICINE

## 2023-02-16 PROCEDURE — 87637 SARSCOV2&INF A&B&RSV AMP PRB: CPT | Performed by: EMERGENCY MEDICINE

## 2023-02-16 PROCEDURE — 71045 X-RAY EXAM CHEST 1 VIEW: CPT

## 2023-02-16 PROCEDURE — 85025 COMPLETE CBC W/AUTO DIFF WBC: CPT

## 2023-02-16 PROCEDURE — 93005 ELECTROCARDIOGRAM TRACING: CPT | Performed by: EMERGENCY MEDICINE

## 2023-02-16 RX ORDER — SODIUM CHLORIDE 0.9 % (FLUSH) 0.9 %
10 SYRINGE (ML) INJECTION AS NEEDED
Status: DISCONTINUED | OUTPATIENT
Start: 2023-02-16 | End: 2023-02-16 | Stop reason: HOSPADM

## 2023-02-16 RX ADMIN — SODIUM CHLORIDE, POTASSIUM CHLORIDE, SODIUM LACTATE AND CALCIUM CHLORIDE 1000 ML: 600; 310; 30; 20 INJECTION, SOLUTION INTRAVENOUS at 17:41

## 2023-02-16 NOTE — ED PROVIDER NOTES
Subjective   History of Present Illness  Pt is a 56 yo female presenting to the ED today for symptoms of high BP, dizziness, headaches, and tachycardia since Sunday. Pt states she saw her primary care provider for the high blood pressure on Monday and was put on metoprolol. She states the blood pressure was still high so she was then put on amlodipine as well as the metoprolol. She is concerned today that her blood pressure is still high as it is typically normal. She states the dizziness is worse when sitting or standing. Pt reports that she was not taking any daily medications before this and that she is in good health. Pt denies any N/V/D, sick contacts, illness, or fever.     History provided by:  Patient      Review of Systems    Past Medical History:   Diagnosis Date   • Anxiety    • Arthritis     On celebrex; steroid injections q3 months in back   • Chronic pain     BILATERAL KNEE; on cymbalta.   • DDD (degenerative disc disease), lumbar     on celebrex   • Dysphagia     EGD 2021- s/p esophageal dilatation   • Frequent UTI    • Gallbladder problem    • Heartburn     chronic, episodic. Tums PRN. EGD 2021. No hx of h pylori   • Herpes    • HTN (hypertension)     previously on lisinopril and Toprol   • IBS (irritable bowel syndrome)    • Left knee pain    • STEPHANIE on CPAP    • PONV (postoperative nausea and vomiting)    • Pregnancy    • S/P laparoscopic cholecystectomy     gallstones   • Urinary incontinence        Allergies   Allergen Reactions   • Penicillins Rash       Past Surgical History:   Procedure Laterality Date   • HIP ARTHROPLASTY Left 2020   • LAPAROSCOPIC CHOLECYSTECTOMY  1994    gallstones   • TOTAL HIP ARTHROPLASTY Right 10/19/2018    Procedure: RIGHT TOTAL  ANTERIOR HIP ARTHROPLASTY;  Surgeon: Mary Be MD;  Location: [facility];  Service: Orthopedics   • TOTAL KNEE ARTHROPLASTY      bilateral   • TOTAL KNEE ARTHROPLASTY REVISION Left 04/06/2021    Procedure: LEFT KNEE REVISION;   Surgeon: Mary Be MD;  Location: [facility];  Service: Orthopedics;  Laterality: Left;       Family History   Problem Relation Age of Onset   • Alzheimer's disease Mother    • Diabetes Father    • Coronary artery disease Father    • Heart disease Father    • Cancer Brother    • Depression Brother    • Diabetes Brother    • BRCA 1/2 Neg Hx    • Breast cancer Neg Hx    • Colon cancer Neg Hx    • Endometrial cancer Neg Hx    • Ovarian cancer Neg Hx    • Malig Hyperthermia Neg Hx    • Uterine cancer Neg Hx        Social History     Socioeconomic History   • Marital status:    Tobacco Use   • Smoking status: Never   • Smokeless tobacco: Never   Vaping Use   • Vaping Use: Never used   Substance and Sexual Activity   • Alcohol use: No   • Drug use: No   • Sexual activity: Defer           Objective   Physical Exam  Vitals and nursing note reviewed.   Constitutional:       Appearance: Normal appearance. She is obese.   HENT:      Head: Normocephalic.   Cardiovascular:      Rate and Rhythm: Normal rate and regular rhythm.      Pulses: Normal pulses.   Pulmonary:      Effort: Pulmonary effort is normal.      Breath sounds: Normal breath sounds.   Abdominal:      General: Abdomen is flat.      Palpations: Abdomen is soft.   Skin:     General: Skin is warm and dry.   Neurological:      Mental Status: She is alert and oriented to person, place, and time.   Psychiatric:         Mood and Affect: Mood normal.         Behavior: Behavior normal.         Procedures           ED Course  ED Course as of 02/16/23 2109   Thu Feb 16, 2023   1902 Patient is resting comfortably.  Patient did report episodes of heart rate and blood pressure dropping down with her heart rate she reports as low as 41.  No clear emergent findings in her evaluation here.  Troponin is negative.  EKG does not demonstrate any ischemic changes.  No evidence of acute bacterial infection.  We will plan to discharge the patient home with a  follow-up with rhythm clinic for further evaluation and monitoring to see if there are significant variations in the heart rate, ectopy, or arrhythmia. I had a discussion with the patient/family regarding diagnosis, diagnostic results, treatment plan, and medications.  The patient/family indicated understanding of these instructions.  I spent adequate time at the bedside prior to discharge necessary to discuss the aftercare instructions, giving patient education, providing explanations of the results of our evaluations/findings, and my decision making to assure that the patient/family understand the plan of care.  Time was allotted to answer questions at that time and throughout the ED course.  Emphasis was placed on timely follow-up after discharge.  I also discussed the potential for the development of an acute emergent condition requiring further evaluation, return to the ER, admission, or even surgical intervention. I discussed that we found nothing during the visit today indicating the need for further ER workup at this time, admission to the hospital, or the presence of an acute unstable medical condition.  I encouraged the patient to return to the emergency department immediately for ANY concerns, worsening, new complaints, or if symptoms persist and unable to seek follow-up in a timely fashion.  The patient/family expressed understanding and agreement with this plan.  [RS]      ED Course User Index  [RS] Arthur Garg MD                                           Medical Decision Making  At risk for bradycardia: acute illness or injury  Elevated blood pressure reading with diagnosis of hypertension: acute illness or injury  Fatigue, unspecified type: acute illness or injury  Palpitations: acute illness or injury  Amount and/or Complexity of Data Reviewed  Labs: ordered.  Radiology: ordered.  ECG/medicine tests: ordered.      Risk  Prescription drug management.          Final diagnoses:   Fatigue,  unspecified type   Elevated blood pressure reading with diagnosis of hypertension   At risk for bradycardia   Palpitations       ED Disposition  ED Disposition     ED Disposition   Discharge    Condition   Stable    Comment   --             [facility] CARDIOLOGY  [address]  [place] [address] [phone]    As soon as possible.    Tiffanie Dickson PA-C  [address] [facility] [address]  [phone]    Schedule an appointment as soon as possible for a visit       [facility] Emergency Department  [address] [address] [phone]    As needed, If symptoms worsen or ANY concerns.         Medication List      No changes were made to your prescriptions during this visit.          Arthur Garg MD  02/16/23 5558

## 2023-02-16 NOTE — TELEPHONE ENCOUNTER
PT SATS THAT HER HEART RATE IS 41 .the patient WAS WONDERING IF ITS LOW BECAUSE OF METOPROLOL PLEASE CALL

## 2023-02-24 NOTE — PROGRESS NOTES
[facility]  Heart and Valve Center    Chief Complaint  Fatigue, Palpitations, and Slow Heart Rate    Subjective    History of Present Illness {CC  Problem List  Visit  Diagnosis   Encounters  Notes  Medications  Labs  Result Review Imaging  Media :23}     Grace Jacob is a 57 y.o. female with hypertension, prediabetes, GERD, Anxiety, STEPHANIE who presents today for evaluation of hypertension, fatigue and palpitations at the request of Dr. Garg.    Patient presented to the ED on 2/16 with complaints of high blood pressure, dizziness, headaches and tachycardia for the past 5 days.  She saw her PCP and was put on metoprolol.  Her blood pressure was still high so she was also placed on amlodipine.  Initial high sensitive troponin slightly elevated, but repeat normal. Reports low grade fever. She reports that at one point she saw a HR of 41, which was unusual for her. Since the ED visit reports that her SBP has mostly been 115. Reports yesterday she did have a slight pain in her chest driving. She denies exertional chest pain but does note exertional dyspnea which she relates to deconditioning. She is physically limited by knee pain. Reports occasional palpitations if she lays on her left side. Notes chronic fatigue       Patient has been evaluated by Dr. Lockhart in the past.  Echo in 2021 showed normal LVEF, diastolic dysfunction, moderately elevated RVSP, increased left ventricular mass, moderate RV dilation and biatrial enlargement.     Cardiac risks: HTN, obesity, family history (father had first MI <60)    Objective     Vital Signs:   Vitals:    02/27/23 0829 02/27/23 0831 02/27/23 0833   BP: 141/87 119/80 130/83   BP Location: Right arm Left arm Left arm   Patient Position: Sitting Standing Sitting   Cuff Size: Large Adult Adult Adult   Pulse: 83 110 89   Resp:   20   Temp:   97.7 °F (36.5 °C)   TempSrc:   Temporal   SpO2: 95% 97% 94%   Weight:   120 kg (264 lb 6 oz)   Height:    "170.2 cm (67\")     Body mass index is 41.41 kg/m².  Physical Exam  Vitals reviewed.   Constitutional:       Appearance: Normal appearance.   HENT:      Head: Normocephalic.   Neck:      Vascular: No carotid bruit.   Cardiovascular:      Rate and Rhythm: Normal rate and regular rhythm.      Pulses: Normal pulses.      Heart sounds: Normal heart sounds, S1 normal and S2 normal. No murmur heard.  Pulmonary:      Effort: Pulmonary effort is normal. No respiratory distress.      Breath sounds: Normal breath sounds.   Chest:      Chest wall: No tenderness.   Abdominal:      General: Abdomen is flat.      Palpations: Abdomen is soft.   Musculoskeletal:      Cervical back: Neck supple.      Right lower leg: No edema.      Left lower leg: No edema.   Skin:     General: Skin is warm and dry.   Neurological:      General: No focal deficit present.      Mental Status: She is alert and oriented to person, place, and time. Mental status is at baseline.   Psychiatric:         Mood and Affect: Mood normal.         Behavior: Behavior normal.         Thought Content: Thought content normal.              Result Review  Data Reviewed:{ Labs  Result Review  Imaging  Med Tab  Media :23}   ECG 12 Lead ED Triage Standing Order; Weak / Dizzy / AMS (02/16/2023 15:10)  Adult Transthoracic Echo Complete W/ Cont if Necessary Per Protocol (10/28/2021 10:00)  High Sensitivity Troponin T 2Hr (02/16/2023 18:26)  Urinalysis, Microscopic Only - Urine, Clean Catch (02/16/2023 17:43)  Urinalysis With Microscopic If Indicated (No Culture) - Urine, Clean Catch (02/16/2023 17:43)  High Sensitivity Troponin T (02/16/2023 15:24)  Magnesium (02/16/2023 15:24)  COVID PRE-OP / PRE-PROCEDURE SCREENING ORDER (NO ISOLATION) - Swab, Nasopharynx (02/16/2023 16:42)  Comprehensive Metabolic Panel (02/16/2023 15:24)  CBC & Differential (02/16/2023 15:24)  POC Glucose Once (02/16/2023 15:07)  Vitamin D,25-Hydroxy (11/01/2022 00:00)  TSH (11/01/2022 00:00)  Lipid " Panel (11/01/2022 00:00)                 Assessment and Plan {CC Problem List  Visit Diagnosis  ROS  Review (Popup)  Health Maintenance  Quality  BestPractice  Medications  SmartSets  SnapShot Encounters  Media :23}   1. Chest pain, unspecified type  -1 episode that was atypical for angina.  We did discuss signs and symptoms of angina and she assures me that she has not experienced any of the symptoms.  Advised to call if she has any worsening exertional symptoms  -Due to family history, recommend coronary calcium scan for further cardiac risk stratification  - CT Cardiac Calcium Score Without Dye; Future    2. Family history of premature CAD    - CT Cardiac Calcium Score Without Dye; Future    3. Primary hypertension  -Appears to be controlled on metoprolol and amlodipine  -Continue home monitoring    4. GREEN (dyspnea on exertion)  -Reports chronic and unchanged.  Reviewed last echo results of 2021 with her.  She feels symptoms are the same and therefore will not repeat echo    5. STEPHANIE on CPAP  -She reports she stopped wearing her CPAP about a week ago, discussed the importance of sleep apnea treatment.  She plans to go to DME company today to get a new mask        Follow Up {Instructions Charge Capture  Follow-up Communications :23}   No follow-ups on file.    Patient was given instructions and counseling regarding her condition or for health maintenance advice. Please see specific information pulled into the AVS if appropriate.  Advised to call the Heart and Valve Center with any questions, concerns, or worsening symptoms.

## 2023-02-27 ENCOUNTER — OFFICE VISIT (OUTPATIENT)
Dept: CARDIOLOGY | Facility: HOSPITAL | Age: 58
End: 2023-02-27
Payer: COMMERCIAL

## 2023-02-27 VITALS
BODY MASS INDEX: 41.49 KG/M2 | SYSTOLIC BLOOD PRESSURE: 130 MMHG | HEART RATE: 89 BPM | TEMPERATURE: 97.7 F | HEIGHT: 67 IN | OXYGEN SATURATION: 94 % | WEIGHT: 264.38 LBS | RESPIRATION RATE: 20 BRPM | DIASTOLIC BLOOD PRESSURE: 83 MMHG

## 2023-02-27 DIAGNOSIS — G47.33 OSA ON CPAP: ICD-10-CM

## 2023-02-27 DIAGNOSIS — R07.9 CHEST PAIN, UNSPECIFIED TYPE: Primary | ICD-10-CM

## 2023-02-27 DIAGNOSIS — R06.09 DOE (DYSPNEA ON EXERTION): ICD-10-CM

## 2023-02-27 DIAGNOSIS — Z82.49 FAMILY HISTORY OF PREMATURE CAD: ICD-10-CM

## 2023-02-27 DIAGNOSIS — Z99.89 OSA ON CPAP: ICD-10-CM

## 2023-02-27 DIAGNOSIS — I10 PRIMARY HYPERTENSION: ICD-10-CM

## 2023-02-27 PROCEDURE — 99214 OFFICE O/P EST MOD 30 MIN: CPT | Performed by: NURSE PRACTITIONER

## 2023-02-27 RX ORDER — PANTOPRAZOLE SODIUM 40 MG/1
40 TABLET, DELAYED RELEASE ORAL DAILY
COMMUNITY

## 2023-04-13 ENCOUNTER — TELEMEDICINE (OUTPATIENT)
Dept: FAMILY MEDICINE CLINIC | Facility: CLINIC | Age: 58
End: 2023-04-13
Payer: COMMERCIAL

## 2023-04-13 VITALS — BODY MASS INDEX: 40.02 KG/M2 | WEIGHT: 255 LBS | HEIGHT: 67 IN

## 2023-04-13 DIAGNOSIS — M19.90 CHRONIC ARTHRITIS: ICD-10-CM

## 2023-04-13 DIAGNOSIS — I10 PRIMARY HYPERTENSION: Primary | ICD-10-CM

## 2023-04-13 DIAGNOSIS — R60.0 LOWER LEG EDEMA: ICD-10-CM

## 2023-04-13 DIAGNOSIS — K21.9 CHRONIC GERD: ICD-10-CM

## 2023-04-13 RX ORDER — HYDROCHLOROTHIAZIDE 25 MG/1
25 TABLET ORAL DAILY
Qty: 30 TABLET | Refills: 1 | Status: SHIPPED | OUTPATIENT
Start: 2023-04-13

## 2023-04-13 RX ORDER — PANTOPRAZOLE SODIUM 40 MG/1
40 TABLET, DELAYED RELEASE ORAL DAILY
Qty: 90 TABLET | Refills: 1 | Status: SHIPPED | OUTPATIENT
Start: 2023-04-13

## 2023-04-13 RX ORDER — METOPROLOL SUCCINATE 25 MG/1
25 TABLET, EXTENDED RELEASE ORAL DAILY
Qty: 90 TABLET | Refills: 1 | Status: SHIPPED | OUTPATIENT
Start: 2023-04-13

## 2023-04-13 RX ORDER — MELOXICAM 15 MG/1
15 TABLET ORAL DAILY
Qty: 90 TABLET | Refills: 1 | Status: SHIPPED | OUTPATIENT
Start: 2023-04-13

## 2023-04-13 RX ORDER — AMLODIPINE BESYLATE 5 MG/1
5 TABLET ORAL DAILY
Qty: 90 TABLET | Refills: 1 | Status: SHIPPED | OUTPATIENT
Start: 2023-04-13

## 2023-04-13 NOTE — PROGRESS NOTES
"Chief Complaint  Med Refill    Subjective         Grace Jacob presents to [facility]  History of Present Illness  Patient reports today for follow-up secondary to having her blood pressure medication adjusted last visit.  Patient states he has been taking amlodipine and metoprolol and her blood pressure has been below 130/80.  Patient reports her sister is in hospice care and she has had a few elevated readings for the past week however she believes it is secondary to stress.  Patient reports she has been having some swelling in her lower legs and is unsure if it is secondary to amlodipine or with the season change.  Patient reports having swelling in the summertime in the past and she took hydrochlorothiazide for relief.    Patient reports having acid reflux states she restarted her pantoprazole and it did help with her symptoms she would like to have a refill of her medication.        Objective   Vital Signs:   Ht 170.2 cm (67\")   Wt 116 kg (255 lb)   BMI 39.94 kg/m²     Estimated body mass index is 39.94 kg/m² as calculated from the following:    Height as of this encounter: 170.2 cm (67\").    Weight as of this encounter: 116 kg (255 lb).     Physical Exam   Pulmonary/Chest: Effort normal.   Psychiatric: She has a normal mood and affect.     Result Review :                 Assessment and Plan    Diagnoses and all orders for this visit:    1. Primary hypertension (Primary)  Assessment & Plan:  Hypertension is improving with treatment.  Continue current treatment regimen.  Patient provided with hydrochlorothiazide to take as needed for swelling  Blood pressure will be reassessed at the next regular appointment.      2. Chronic arthritis  Assessment & Plan:  Patient will continue to take meloxicam.  Discussed the importance of movement and stretching      3. Chronic GERD  Assessment & Plan:  Refill patient's medication      4. Lower leg edema  Assessment & Plan:  Patient " prescribed hydrochlorothiazide to take as needed for edema.  She has taken it in the past and it is worked well.        Follow Up   No follow-ups on file.  Patient was given instructions and counseling regarding her condition or for health maintenance advice. Please see specific information pulled into the AVS if appropriate.     Mode of Visit: Video  Location of patient: home  Location of provider: [facility]  You have chosen to receive care through a telehealth visit.  The patient has signed the video visit consent form.  The visit included audio and video interaction. No technical issues occurred during this visit.

## 2023-04-13 NOTE — ASSESSMENT & PLAN NOTE
Hypertension is improving with treatment.  Continue current treatment regimen.  Patient provided with hydrochlorothiazide to take as needed for swelling  Blood pressure will be reassessed at the next regular appointment.

## 2023-04-13 NOTE — ASSESSMENT & PLAN NOTE
Patient prescribed hydrochlorothiazide to take as needed for edema.  She has taken it in the past and it is worked well.

## 2023-04-16 ENCOUNTER — HOSPITAL ENCOUNTER (OUTPATIENT)
Dept: CT IMAGING | Facility: HOSPITAL | Age: 58
Discharge: HOME | End: 2023-04-16
Admitting: NURSE PRACTITIONER

## 2023-04-16 DIAGNOSIS — Z82.49 FAMILY HISTORY OF PREMATURE CAD: ICD-10-CM

## 2023-04-16 DIAGNOSIS — R07.9 CHEST PAIN, UNSPECIFIED TYPE: ICD-10-CM

## 2023-04-16 PROCEDURE — 75571 CT HRT W/O DYE W/CA TEST: CPT

## 2023-04-17 NOTE — PROGRESS NOTES
Your coronary calcium score was 0, this is great news and places you at very low risk of any cardiac event in the next 5 years.  Please let me know if you have any further questions or concerns

## 2023-07-20 PROBLEM — M19.90 CHRONIC ARTHRITIS: Status: RESOLVED | Noted: 2022-07-07 | Resolved: 2023-07-20

## 2023-08-18 ENCOUNTER — CLINICAL SUPPORT (OUTPATIENT)
Dept: FAMILY MEDICINE CLINIC | Facility: CLINIC | Age: 58
End: 2023-08-18
Payer: COMMERCIAL

## 2023-08-18 DIAGNOSIS — M19.90 CHRONIC ARTHRITIS: Primary | ICD-10-CM

## 2023-08-18 PROCEDURE — 96372 THER/PROPH/DIAG INJ SC/IM: CPT | Performed by: PHYSICIAN ASSISTANT

## 2023-08-18 RX ORDER — TRIAMCINOLONE ACETONIDE 40 MG/ML
80 INJECTION, SUSPENSION INTRA-ARTICULAR; INTRAMUSCULAR ONCE
Status: COMPLETED | OUTPATIENT
Start: 2023-08-18 | End: 2023-08-18

## 2023-08-18 RX ADMIN — TRIAMCINOLONE ACETONIDE 80 MG: 40 INJECTION, SUSPENSION INTRA-ARTICULAR; INTRAMUSCULAR at 15:59

## 2023-08-26 DIAGNOSIS — F32.A MILD DEPRESSION: ICD-10-CM

## 2023-08-28 RX ORDER — DULOXETIN HYDROCHLORIDE 30 MG/1
30 CAPSULE, DELAYED RELEASE ORAL DAILY
Qty: 30 CAPSULE | Refills: 1 | Status: SHIPPED | OUTPATIENT
Start: 2023-08-28

## 2023-10-17 ENCOUNTER — TELEPHONE (OUTPATIENT)
Dept: OBSTETRICS AND GYNECOLOGY | Facility: CLINIC | Age: 58
End: 2023-10-17

## 2023-10-17 NOTE — TELEPHONE ENCOUNTER
Caller: Grace Jacob    Relationship to patient: Self    Best call back number: [phone]    Patient is needing: PATIENT CALLED AND STATED SHE HAS AN APPOINTMENT W/ AN ORTHOPEDIC SURGEON TOMORROW TO DISCUSS KNEE SURGERY AND THEY WOULD LIKE TO KNOW WHEN AND WHERE HER LAST BONE DENSITY TEST WAS DONE - PATIENT STATED THAT SHE IS PRETTY SURE SHE HAD IS DONE IN DR SANTILLAN'S OFFICE ABOUT 3 OR 4 YEARS AGO - HUB UNABLE TO FIND THIS RECORD IN EPIC, SENDING TE FOR PRACTICE TO CHECK OLD SYSTEM

## 2023-10-18 DIAGNOSIS — I10 PRIMARY HYPERTENSION: ICD-10-CM

## 2023-10-18 PROBLEM — T84.022A INSTABILITY OF INTERNAL RIGHT KNEE PROSTHESIS: Status: ACTIVE | Noted: 2023-10-18

## 2023-10-18 RX ORDER — HYDROCHLOROTHIAZIDE 25 MG/1
25 TABLET ORAL DAILY
Qty: 90 TABLET | Refills: 0 | Status: SHIPPED | OUTPATIENT
Start: 2023-10-18

## 2023-10-18 RX ORDER — ACETAMINOPHEN 10 MG/ML
1000 INJECTION, SOLUTION INTRAVENOUS ONCE
Status: CANCELLED | OUTPATIENT
Start: 2023-10-18 | End: 2023-10-18

## 2023-10-18 RX ORDER — CHLORHEXIDINE GLUCONATE 500 MG/1
CLOTH TOPICAL ONCE
Status: CANCELLED | OUTPATIENT
Start: 2023-10-18 | End: 2023-10-18

## 2023-10-18 RX ORDER — PREGABALIN 75 MG/1
75 CAPSULE ORAL ONCE
Status: CANCELLED | OUTPATIENT
Start: 2023-10-18 | End: 2023-10-18

## 2023-10-18 RX ORDER — CEFAZOLIN SODIUM 2 G/100ML
2 INJECTION, SOLUTION INTRAVENOUS ONCE
Status: CANCELLED | OUTPATIENT
Start: 2023-10-18 | End: 2023-10-18

## 2023-10-18 RX ORDER — CHLORHEXIDINE GLUCONATE 500 MG/1
CLOTH TOPICAL 2 TIMES DAILY
Status: CANCELLED | OUTPATIENT
Start: 2023-10-18

## 2023-10-18 RX ORDER — VANCOMYCIN/0.9 % SOD CHLORIDE 1.5G/250ML
1500 PLASTIC BAG, INJECTION (ML) INTRAVENOUS ONCE
Status: CANCELLED | OUTPATIENT
Start: 2023-10-18 | End: 2023-10-18

## 2023-10-18 NOTE — H&P
Chief Complaint  Followup: History of right total knee replacement  Followup: Mechanical complication of internal joint prosthesis  Patient's Care Team  Primary Care Provider: CANDIE FONTENOT: [address], [place] [address], Ph [phone], Fax [phone] NPI: [id number]  Patient's Pharmacies  [id number] ([place]): [address]  [facility] [address], [place] [address], Ph [phone], Fax [phone]  Vitals  2023-10-18 15:18  Ht: 5 ft 7 in  Wt: 248 lbs  BMI: 38.8  Body Surface Area: 2.31 m²  Allergies  Reviewed Allergies  PENICILLINS: - Critical  Category: Drug   Uncoded Allergies  CEPHRO (Critical) 08/31/2018 (Active)  Medications  Reviewed Medications  amLODIPine 5 mg tablet  TAKE 1 TABLET BY MOUTH EVERY DAY FOR BLOOD PRESSURE  07/13/23   filled surescripts  clindamycin  mg capsule  TK FOUR CS PO 1 HOUR B DAPP  09/27/23   filled surescripts  DULoxetine 30 mg capsule,delayed release  TAKE 1 CAPSULE BY MOUTH DAILY  08/17/23   filled surescripts  DULoxetine 60 mg capsule,delayed release  TAKE 1 CAPSULE BY MOUTH EVERY NIGHT FOR MOOD OR PAIN  08/11/23   filled surescripts  hydroCHLOROthiazide 25 mg tablet  TAKE 1 TABLET BY MOUTH DAILY AS NEEDED FOR SWELLING  10/17/23   filled surescripts  meloxicam 15 mg tablet  TAKE 1 TABLET BY MOUTH DAILY WITH FOOD FOR JOINT PAIN  07/13/23   filled surescripts  metoprolol succinate ER 25 mg tablet,extended release 24 hr  TAKE 1 TABLET BY MOUTH DAILY FOR BLOOD PRESSURE  07/13/23   filled surescripts  nystatin 100,000 unit/gram topical cream  APPLY TOPICALLY TO THE AFFECTED AREA TWICE DAILY AS DIRECTED  01/26/23   filled surescripts  Nystop 100,000 unit/gram topical powder  APPLY TOPICALLY TO THE AFFECTED AREA TWICE DAILY  09/29/21   filled surescripts  pantoprazole 40 mg tablet,delayed release  TAKE 1 TABLET BY MOUTH DAILY FOR STOMACH ACID OR REFLUX  07/13/23   filled surescripts  traMADoL 50 mg tablet  TAKE 1 TABLET BY MOUTH EVERY 8 HOURS AS  NEEDED  10/05/23   filled surescripts  valACYclovir 500 mg tablet  TAKE 1 TABLET BY MOUTH DAILY  10/16/23   filled surescripts  Wegovy 2.4 mg/0.75 mL subcutaneous pen injector  ADMINISTER 0.75 ML UNDER THE SKIN EVERY WEEK AS DIRECTED  08/14/23   filled surescripts  Vaccines  Reviewed Vaccines  no flu shot 2022  Problems  Reviewed Problems  Lumbar spondylosis - Onset: 08/01/2022  Spinal stenosis of lumbar region - Onset: 07/08/2022  Chronic low back pain - Onset: 08/01/2022  Lumbosacral radiculitis - Onset: 07/13/2022  Lumbar radiculopathy - Onset: 08/01/2022  Mechanical complication of internal joint prosthesis - Onset: 02/28/2023  Mechanical complication of internal joint prosthesis - Onset: 02/28/2023, Right  History of right total knee replacement - Onset: 02/28/2023  Family History  Reviewed Family History  Father - Diabetes mellitus    - Heart disease  Brother - Diabetes mellitus    - Family history of cancer  Social History  Reviewed Social History  Public Health and Travel  Have you recently traveled abroad?: No  Have you been to an area known to be high risk for COVID-19?: No  In the 14 days before symptom onset, have you had close contact with a laboratory-confirmed COVID-19 while that case was ill?: No  In the 14 days before symptom onset, have you had close contact with a person who is under investigation for COVID-19 while that person was ill?: No  Substance Use  Do you or have you ever smoked tobacco?: Never smoker  Do you or have you ever used any other forms of tobacco or nicotine?: No  Do you or have you ever used smokeless tobacco?: Never used smokeless tobacco  Do you or have you ever used e-cigarettes or vape?: Never used electronic cigarettes  Has tobacco cessation counseling been provided?: No  What is your level of alcohol consumption?: None  Do you use any illicit or recreational drugs?: No  Advance Directive  Do you have an advance directive?: No  Do you have a medical power of ?:  No  Surgical History  Reviewed Surgical History  Knee Surgery  Procedure on hip - 01/01/2017  Procedure on knee - 01/01/2012 - left 2021  Gallbladder Surgery - 01/01/1994    right 2020  Past Medical History  Reviewed Past Medical History  Arthritis: Y  Gastrointestinal Disease/GERD: Y  Hypertension: Y  HPI  Grace is a 58-year-old female who comes into the office today with complaints of pain and instability in the right knee. She did have severe buckling of the right knee about 3 weeks back. This happened while she was in [place]. She did walk for several hours at the mall. Next day she had this locking/buckling episode. Followed by this she had severe pain on the front of her right knee.    She has tried outpatient physical therapy but this is not helping the knee. She is scheduled for a revision right total knee replacement for November 2023.  She would like to proceed with the revision knee replacement.    She has had recurrent episodes of instability and has seen me on several occasions in the past for this.  Her most recent difficulty was trying to get up from the toilet seat and she had severe pain in the right knee.    Her history is significant for a right total knee replacement by Dr. Wesley Cuellar March 2020.    Since then she has been noticing increasing instability.    She has always felt some instability in the right knee and some difficulty in walking distances.    She is known to me from her revision left total knee replacement.    Denies any history of MRSA, DVT, cardiac problems.  ROS  ROS as noted in the HPI  Physical Exam  Right KNEE    There is a mature midline anterior scar.  No signs of infection.  Mild valgus alignment.  There is no atrophy.  There is no effusion.  No warmth. No erythema.  Range of motion of the knee is 0 to 120 degrees of flexion.  There is no tenderness in the knee.  Patellar tracking is normal. Patellar crepitation is not present.  Posterior drawer grade 0. Valgus  grade 1. Varus grade 1.  Assessment / Plan  1. Mechanical complication of internal joint prosthesis - Right -  Mechanical instability  T84.092A: Other mechanical complication of internal right knee prosthesis, initial encounter  MEDICAL CLEARANCE* -     Note to Provider: Scheduled for Revision RIGHT Total knee replacement at [facility]. Please provide medical clearance.    2. Follow-up orthopedic assessment  Z47.89: Encounter for other orthopedic aftercare  XR, KNEE, 3 VIEW  Side: RIGHT    3. History of right total knee replacement  Z96.651: Presence of right artificial knee joint    XR, KNEE, 3 VIEW  Side: RIGHT  Result:  - XRAY KNEE 3 VIEW: Performed  Result Note: Well fixed total knee arthroplasty implants, satisfactory alignment  Discussion Notes  Unfortunately she continues to have several episodes of instability.  She has failed physical therapy and nonoperative measures.    Ideally she is a candidate for a right knee revision total knee replacement.    Options and alternatives were discussed in detail with the patient.  The patient has reached a point of disability and has failed nonoperative management. The patient is indicated for a revision right total knee arthroplasty.    Likely, Risks and benefits of the procedure including but not limited to infection, DVT, pulmonary embolism, stiffness, future loosening of the implants, possibility of injury to nerves vessels and tendons, periprosthetic fractures have been discussed in detail. Despite the risks involved, The patient would like to proceed.  The patient is being scheduled for a revision right total knee arthroplasty at [facility] on November 10, 2023.    I will request for Medical and cardiac clearance from her primary care provider Tiffanie Dickson  Postoperative DVT prophylaxis - Patient has no high risk factors Plan for ASPIRIN .  Preoperative antibiotic prophylaxis - Plan for SCIP protocol with CEFAZOLIN weight based. Will give VANCOMYCIN  in addition due to revision surgery.  Surgery will be scheduled for inpatient status due to medical comorbidities.    Pending surgery she would like to work from home.

## 2023-10-25 ENCOUNTER — OFFICE VISIT (OUTPATIENT)
Dept: FAMILY MEDICINE CLINIC | Facility: CLINIC | Age: 58
End: 2023-10-25
Payer: COMMERCIAL

## 2023-10-25 VITALS
SYSTOLIC BLOOD PRESSURE: 98 MMHG | HEIGHT: 67 IN | DIASTOLIC BLOOD PRESSURE: 80 MMHG | HEART RATE: 60 BPM | BODY MASS INDEX: 39.71 KG/M2 | WEIGHT: 253 LBS

## 2023-10-25 DIAGNOSIS — M19.90 CHRONIC ARTHRITIS: ICD-10-CM

## 2023-10-25 DIAGNOSIS — J20.9 ACUTE BRONCHITIS, UNSPECIFIED ORGANISM: ICD-10-CM

## 2023-10-25 DIAGNOSIS — I10 PRIMARY HYPERTENSION: Primary | ICD-10-CM

## 2023-10-25 DIAGNOSIS — E66.01 MORBID (SEVERE) OBESITY DUE TO EXCESS CALORIES: ICD-10-CM

## 2023-10-25 DIAGNOSIS — R73.03 PREDIABETES: ICD-10-CM

## 2023-10-25 DIAGNOSIS — F32.A MILD DEPRESSION: ICD-10-CM

## 2023-10-25 DIAGNOSIS — K21.9 CHRONIC GERD: ICD-10-CM

## 2023-10-25 PROBLEM — Z96.651 HISTORY OF TOTAL RIGHT KNEE REPLACEMENT: Status: ACTIVE | Noted: 2023-02-28

## 2023-10-25 PROBLEM — T84.099A MECHANICAL COMPLICATION OF INTERNAL JOINT PROSTHESIS: Status: ACTIVE | Noted: 2023-02-28

## 2023-10-25 PROBLEM — M47.816 LUMBAR SPONDYLOSIS: Status: ACTIVE | Noted: 2022-08-01

## 2023-10-25 PROBLEM — M48.061 SPINAL STENOSIS OF LUMBAR REGION: Status: ACTIVE | Noted: 2022-07-08

## 2023-10-25 PROBLEM — M54.16 LUMBAR RADICULOPATHY: Status: ACTIVE | Noted: 2022-08-01

## 2023-10-25 PROBLEM — N39.3 STRESS INCONTINENCE: Status: ACTIVE | Noted: 2022-11-21

## 2023-10-25 PROBLEM — M54.17 LUMBOSACRAL RADICULITIS: Status: ACTIVE | Noted: 2022-07-13

## 2023-10-25 RX ORDER — CEFPODOXIME PROXETIL 200 MG/1
TABLET, FILM COATED ORAL
COMMUNITY
Start: 2023-10-19

## 2023-10-25 RX ORDER — NAPROXEN 375 MG/1
TABLET ORAL EVERY 12 HOURS SCHEDULED
COMMUNITY

## 2023-10-25 RX ORDER — DEXTROMETHORPHAN HYDROBROMIDE AND PROMETHAZINE HYDROCHLORIDE 15; 6.25 MG/5ML; MG/5ML
5 SYRUP ORAL 4 TIMES DAILY PRN
Qty: 180 ML | Refills: 1 | Status: SHIPPED | OUTPATIENT
Start: 2023-10-25

## 2023-10-25 RX ORDER — CLINDAMYCIN HYDROCHLORIDE 150 MG/1
CAPSULE ORAL
COMMUNITY
Start: 2023-09-27

## 2023-10-25 RX ORDER — DULOXETIN HYDROCHLORIDE 60 MG/1
60 CAPSULE, DELAYED RELEASE ORAL NIGHTLY
Qty: 90 CAPSULE | Refills: 1 | Status: SHIPPED | OUTPATIENT
Start: 2023-10-25

## 2023-10-25 RX ORDER — SEMAGLUTIDE 0.68 MG/ML
0.25 INJECTION, SOLUTION SUBCUTANEOUS WEEKLY
Qty: 9 ML | Refills: 1 | Status: SHIPPED | OUTPATIENT
Start: 2023-10-25

## 2023-10-25 RX ORDER — SOLIFENACIN SUCCINATE 10 MG/1
TABLET, FILM COATED ORAL EVERY 24 HOURS
COMMUNITY

## 2023-10-25 RX ORDER — DOXYCYCLINE HYCLATE 100 MG/1
100 CAPSULE ORAL 2 TIMES DAILY
Qty: 20 CAPSULE | Refills: 0 | Status: SHIPPED | OUTPATIENT
Start: 2023-10-25

## 2023-10-25 RX ORDER — AMLODIPINE BESYLATE 5 MG/1
5 TABLET ORAL DAILY
Qty: 90 TABLET | Refills: 1 | Status: SHIPPED | OUTPATIENT
Start: 2023-10-25

## 2023-10-25 RX ORDER — METOPROLOL SUCCINATE 25 MG/1
25 TABLET, EXTENDED RELEASE ORAL DAILY
Qty: 90 TABLET | Refills: 1 | Status: SHIPPED | OUTPATIENT
Start: 2023-10-25

## 2023-10-25 RX ORDER — HYDROCHLOROTHIAZIDE 25 MG/1
25 TABLET ORAL DAILY
Qty: 90 TABLET | Refills: 1 | Status: SHIPPED | OUTPATIENT
Start: 2023-10-25

## 2023-10-25 RX ORDER — PANTOPRAZOLE SODIUM 40 MG/1
40 TABLET, DELAYED RELEASE ORAL DAILY
Qty: 90 TABLET | Refills: 1 | Status: SHIPPED | OUTPATIENT
Start: 2023-10-25

## 2023-10-25 RX ORDER — CELECOXIB 100 MG/1
CAPSULE ORAL EVERY 12 HOURS SCHEDULED
COMMUNITY
End: 2023-10-25

## 2023-10-25 NOTE — ASSESSMENT & PLAN NOTE
We will check A1c and patient provided with prescription for Ozempic this date.  Patient denies any personal family history of thyroid cancer and denies any pancreatitis.

## 2023-10-25 NOTE — PROGRESS NOTES
"Chief Complaint  Hypertension, Prediabetes, Anxiety, and Arthritis (Follow up )    Subjective        Grace Jacob presents to [facility]  History of Present Illness  Patient reports today secondary to needing medication refills and blood work.    Patient reports having high blood pressure states she takes her medication daily.  Reports when she checks at home her blood pressure is higher than the reading will receive in the office today.  Patient reports feeling well.    Patient reports having prediabetes states she has taken Saxenda and Wegovy in the past.  Patient would like to start working on her weight since she is having her knee replaced.  Patient request to be prescribed another medication to help with weight loss and her sugar.    Patient continues to take duloxetine for chronic pain and mood.  Patient reports that is working well and would like refills.    Patient is scheduled for knee replacement surgery in November.  Reports at this time she has not taken any pain medication.    Patient reports she has had a cough with a wheeze for the past 2 weeks.  Patient reports coughing so bad that she went to the ER.  Patient states she has taken an antibiotic from the emergency room however her symptoms have not improved.  Hypertension  Associated symptoms include anxiety.   Anxiety        Arthritis        Objective   Vital Signs:  BP 98/80   Pulse 60   Ht 170.2 cm (67\")   Wt 115 kg (253 lb)   BMI 39.63 kg/m²   Estimated body mass index is 39.63 kg/m² as calculated from the following:    Height as of this encounter: 170.2 cm (67\").    Weight as of this encounter: 115 kg (253 lb).               Physical Exam  Vitals and nursing note reviewed.   Constitutional:       General: She is not in acute distress.     Appearance: Normal appearance. She is obese.   HENT:      Head: Normocephalic.      Right Ear: Hearing normal.      Left Ear: Hearing normal.   Eyes:      Pupils: " Pupils are equal, round, and reactive to light.   Cardiovascular:      Rate and Rhythm: Normal rate and regular rhythm.   Pulmonary:      Effort: Pulmonary effort is normal. No respiratory distress.      Breath sounds: Wheezing present.   Skin:     General: Skin is warm and dry.   Neurological:      Mental Status: She is alert.   Psychiatric:         Mood and Affect: Mood normal.        Result Review :                   Assessment and Plan   Diagnoses and all orders for this visit:    1. Primary hypertension (Primary)  Assessment & Plan:  Hypertension is improving with treatment.  Continue current treatment regimen.  Blood pressure will be reassessed at the next regular appointment.    Orders:  -     amLODIPine (NORVASC) 5 MG tablet; Take 1 tablet by mouth Daily. For blood pressue  Dispense: 90 tablet; Refill: 1  -     metoprolol succinate XL (Toprol XL) 25 MG 24 hr tablet; Take 1 tablet by mouth Daily. For blood pressure  Dispense: 90 tablet; Refill: 1  -     hydroCHLOROthiazide (HYDRODIURIL) 25 MG tablet; Take 1 tablet by mouth Daily. As needed for swelling  Dispense: 90 tablet; Refill: 1  -     CBC & Differential; Future  -     Comprehensive Metabolic Panel; Future  -     TSH; Future  -     Lipid Panel; Future    2. Morbid (severe) obesity due to excess calories  Assessment & Plan:  Patient's (Body mass index is 39.63 kg/m².) indicates that they are morbidly/severely obese (BMI > 40 or > 35 with obesity - related health condition) with health conditions that include hypertension and osteoarthritis . Weight is worsening. BMI  is above average; BMI management plan is completed. We discussed low calorie, low carb based diet program, portion control, increasing exercise, and pharmacologic options including Ozempic .       3. Chronic GERD  Assessment & Plan:  Renew patient's pantoprazole    Orders:  -     pantoprazole (PROTONIX) 40 MG EC tablet; Take 1 tablet by mouth Daily. FOR ACID REFLUX  Dispense: 90 tablet;  Refill: 1    4. Prediabetes  Assessment & Plan:  We will check A1c and patient provided with prescription for Ozempic this date.  Patient denies any personal family history of thyroid cancer and denies any pancreatitis.    Orders:  -     Hemoglobin A1c; Future  -     Semaglutide,0.25 or 0.5MG/DOS, (Ozempic, 0.25 or 0.5 MG/DOSE,) 2 MG/3ML solution pen-injector; Inject 0.25 mg under the skin into the appropriate area as directed 1 (One) Time Per Week. For 4 weeks then 0.5mg weekly  Dispense: 9 mL; Refill: 1    5. Chronic arthritis  -     DULoxetine (CYMBALTA) 60 MG capsule; Take 1 capsule by mouth Every Night. For mood and pain  Dispense: 90 capsule; Refill: 1  -     doxycycline (VIBRAMYCIN) 100 MG capsule; Take 1 capsule by mouth 2 (Two) Times a Day.  Dispense: 20 capsule; Refill: 0    6. Mild depression  Assessment & Plan:  Patient's depression is recurrent and is mild without psychosis. Their depression is currently in partial remission and the condition is improving with treatment. This will be reassessed at the next regular appointment. F/U as described:patient will continue current medication therapy.    Orders:  -     DULoxetine (CYMBALTA) 60 MG capsule; Take 1 capsule by mouth Every Night. For mood and pain  Dispense: 90 capsule; Refill: 1    7. Acute bronchitis, unspecified organism  Assessment & Plan:  Patient prescribed doxycycline and Promethazine DM.  Recommended patient stay hydrated and call if any problems arise    Orders:  -     promethazine-dextromethorphan (PROMETHAZINE-DM) 6.25-15 MG/5ML syrup; Take 5 mL by mouth 4 (Four) Times a Day As Needed for Cough. Caution sedation  Dispense: 180 mL; Refill: 1             Follow Up   Return in about 3 months (around 1/25/2024).  Patient was given instructions and counseling regarding her condition or for health maintenance advice. Please see specific information pulled into the AVS if appropriate.

## 2023-10-25 NOTE — ASSESSMENT & PLAN NOTE
Patient prescribed doxycycline and Promethazine DM.  Recommended patient stay hydrated and call if any problems arise   numerical 0-10

## 2023-10-25 NOTE — ASSESSMENT & PLAN NOTE
Patient's (Body mass index is 39.63 kg/m².) indicates that they are morbidly/severely obese (BMI > 40 or > 35 with obesity - related health condition) with health conditions that include hypertension and osteoarthritis . Weight is worsening. BMI  is above average; BMI management plan is completed. We discussed low calorie, low carb based diet program, portion control, increasing exercise, and pharmacologic options including Ozempic .

## 2023-11-02 ENCOUNTER — OFFICE VISIT (OUTPATIENT)
Dept: OBSTETRICS AND GYNECOLOGY | Facility: CLINIC | Age: 58
End: 2023-11-02
Payer: COMMERCIAL

## 2023-11-02 ENCOUNTER — PRE-ADMISSION TESTING (OUTPATIENT)
Dept: PREADMISSION TESTING | Facility: HOSPITAL | Age: 58
End: 2023-11-02
Payer: COMMERCIAL

## 2023-11-02 VITALS
SYSTOLIC BLOOD PRESSURE: 134 MMHG | WEIGHT: 258 LBS | OXYGEN SATURATION: 96 % | DIASTOLIC BLOOD PRESSURE: 79 MMHG | BODY MASS INDEX: 40.49 KG/M2 | RESPIRATION RATE: 18 BRPM | HEART RATE: 82 BPM | TEMPERATURE: 98.1 F | HEIGHT: 67 IN

## 2023-11-02 VITALS
DIASTOLIC BLOOD PRESSURE: 82 MMHG | WEIGHT: 257 LBS | BODY MASS INDEX: 40.34 KG/M2 | SYSTOLIC BLOOD PRESSURE: 110 MMHG | HEIGHT: 67 IN

## 2023-11-02 DIAGNOSIS — Z01.419 PAP TEST, AS PART OF ROUTINE GYNECOLOGICAL EXAMINATION: Primary | ICD-10-CM

## 2023-11-02 LAB
25(OH)D3+25(OH)D2 SERPL-MCNC: 38.9 NG/ML (ref 30–100)
ALBUMIN SERPL-MCNC: 4.2 G/DL (ref 3.5–5.2)
ALBUMIN/GLOB SERPL: 1.6 G/DL
ALP SERPL-CCNC: 104 U/L (ref 39–117)
ALT SERPL-CCNC: 19 U/L (ref 1–33)
ANION GAP SERPL CALCULATED.3IONS-SCNC: 9.4 MMOL/L (ref 5–15)
AST SERPL-CCNC: 15 U/L (ref 1–32)
BASOPHILS # BLD AUTO: 0.05 10*3/MM3 (ref 0–0.2)
BASOPHILS NFR BLD AUTO: 0.3 % (ref 0–1.5)
BILIRUB SERPL-MCNC: 0.4 MG/DL (ref 0–1.2)
BUN SERPL-MCNC: 20 MG/DL (ref 6–20)
BUN SERPL-MCNC: 21 MG/DL (ref 6–20)
BUN/CREAT SERPL: 21.5 (ref 7–25)
BUN/CREAT SERPL: 23.6 (ref 7–25)
CALCIUM SERPL-MCNC: 9.6 MG/DL (ref 8.6–10.5)
CALCIUM SPEC-SCNC: 9.1 MG/DL (ref 8.6–10.5)
CHLORIDE SERPL-SCNC: 102 MMOL/L (ref 98–107)
CHLORIDE SERPL-SCNC: 103 MMOL/L (ref 98–107)
CHOLEST SERPL-MCNC: 201 MG/DL (ref 0–200)
CO2 SERPL-SCNC: 28.4 MMOL/L (ref 22–29)
CO2 SERPL-SCNC: 28.6 MMOL/L (ref 22–29)
CREAT SERPL-MCNC: 0.89 MG/DL (ref 0.57–1)
CREAT SERPL-MCNC: 0.93 MG/DL (ref 0.57–1)
DEPRECATED RDW RBC AUTO: 45.1 FL (ref 37–54)
EGFRCR SERPLBLD CKD-EPI 2021: 70.9 ML/MIN/1.73
EGFRCR SERPLBLD CKD-EPI 2021: 74.8 ML/MIN/1.73
EOSINOPHIL # BLD AUTO: 0.2 10*3/MM3 (ref 0–0.4)
EOSINOPHIL NFR BLD AUTO: 1.4 % (ref 0.3–6.2)
ERYTHROCYTE [DISTWIDTH] IN BLOOD BY AUTOMATED COUNT: 13.1 % (ref 12.3–15.4)
ERYTHROCYTE [DISTWIDTH] IN BLOOD BY AUTOMATED COUNT: 13.4 % (ref 12.3–15.4)
GLOBULIN SER CALC-MCNC: 2.6 GM/DL
GLUCOSE SERPL-MCNC: 112 MG/DL (ref 65–99)
GLUCOSE SERPL-MCNC: 96 MG/DL (ref 65–99)
HBA1C MFR BLD: 6.4 % (ref 4.8–5.6)
HCT VFR BLD AUTO: 37 % (ref 34–46.6)
HCT VFR BLD AUTO: 40.5 % (ref 34–46.6)
HDLC SERPL-MCNC: 63 MG/DL (ref 40–60)
HGB BLD-MCNC: 12.4 G/DL (ref 12–15.9)
HGB BLD-MCNC: 13.4 G/DL (ref 12–15.9)
IMM GRANULOCYTES # BLD AUTO: 0.06 10*3/MM3 (ref 0–0.05)
IMM GRANULOCYTES NFR BLD AUTO: 0.4 % (ref 0–0.5)
LDLC SERPL CALC-MCNC: 121 MG/DL (ref 0–100)
LYMPHOCYTES # BLD AUTO: 3.46 10*3/MM3 (ref 0.7–3.1)
LYMPHOCYTES NFR BLD AUTO: 24 % (ref 19.6–45.3)
MCH RBC QN AUTO: 31.7 PG (ref 26.6–33)
MCH RBC QN AUTO: 32.1 PG (ref 26.6–33)
MCHC RBC AUTO-ENTMCNC: 33.1 G/DL (ref 31.5–35.7)
MCHC RBC AUTO-ENTMCNC: 33.5 G/DL (ref 31.5–35.7)
MCV RBC AUTO: 94.6 FL (ref 79–97)
MCV RBC AUTO: 96.9 FL (ref 79–97)
MONOCYTES # BLD AUTO: 0.89 10*3/MM3 (ref 0.1–0.9)
MONOCYTES NFR BLD AUTO: 6.2 % (ref 5–12)
NEUTROPHILS # BLD AUTO: 9.78 10*3/MM3 (ref 1.7–7)
NEUTROPHILS NFR BLD AUTO: 67.7 % (ref 42.7–76)
NRBC BLD AUTO-RTO: 0 /100 WBC (ref 0–0.2)
PLATELET # BLD AUTO: 326 10*3/MM3 (ref 140–450)
PLATELET # BLD AUTO: 349 10*3/MM3 (ref 140–450)
PMV BLD AUTO: 9.3 FL (ref 6–12)
POTASSIUM SERPL-SCNC: 4 MMOL/L (ref 3.5–5.2)
POTASSIUM SERPL-SCNC: 4.5 MMOL/L (ref 3.5–5.2)
PROT SERPL-MCNC: 6.8 G/DL (ref 6–8.5)
RBC # BLD AUTO: 3.91 10*6/MM3 (ref 3.77–5.28)
RBC # BLD AUTO: 4.18 10*6/MM3 (ref 3.77–5.28)
SODIUM SERPL-SCNC: 140 MMOL/L (ref 136–145)
SODIUM SERPL-SCNC: 142 MMOL/L (ref 136–145)
T4 FREE SERPL-MCNC: 1.08 NG/DL (ref 0.93–1.7)
TRIGL SERPL-MCNC: 97 MG/DL (ref 0–150)
TSH SERPL DL<=0.005 MIU/L-ACNC: 3.2 UIU/ML (ref 0.27–4.2)
VLDLC SERPL CALC-MCNC: 17 MG/DL (ref 5–40)
WBC # BLD AUTO: 14.44 10*3/MM3 (ref 3.4–10.8)
WBC NRBC COR # BLD: 13.31 10*3/MM3 (ref 3.4–10.8)

## 2023-11-02 PROCEDURE — 93005 ELECTROCARDIOGRAM TRACING: CPT

## 2023-11-02 PROCEDURE — 99396 PREV VISIT EST AGE 40-64: CPT | Performed by: OBSTETRICS & GYNECOLOGY

## 2023-11-02 PROCEDURE — 93010 ELECTROCARDIOGRAM REPORT: CPT | Performed by: INTERNAL MEDICINE

## 2023-11-02 PROCEDURE — 36415 COLL VENOUS BLD VENIPUNCTURE: CPT

## 2023-11-02 PROCEDURE — 85027 COMPLETE CBC AUTOMATED: CPT

## 2023-11-02 PROCEDURE — 80048 BASIC METABOLIC PNL TOTAL CA: CPT

## 2023-11-02 NOTE — PROGRESS NOTES
Gynecologic Annual Exam Note        GYN Annual Exam     CC - Here for annual exam.        HPI  Grace Jacob is a 58 y.o. female, , who presents for annual well woman exam as a established patient.  She is postmenopausal. Reports vaginal bleeding, describes as spotting.  Patient reports problems with:  occasional spotting-states last episode within the past 6 months . Patient had u/s last year for similar issues-no endometrial bx at that time. There were no changes to her medical or surgical history since her last visit.. Partner Status: Marital Status: .  She is is sexually active. She has not had new partners.. STD testing recommendations have been explained to the patient and she does not desire STD testing.    Additional OB/GYN History   On HRT? No    Last Pap : 2022. Results: negative. HPV: not done.   Last Completed Pap Smear       This patient has no relevant Health Maintenance data.          History of abnormal Pap smear: no  Family history of uterine, colon, breast, or ovarian cancer: no  Performs monthly Self-Breast Exam: yes  Last mammogram: 2021. Done at .    Last Completed Mammogram       This patient has no relevant Health Maintenance data.          Last colonoscopy: has had a colonoscopy 8 year(s) ago.    Last Completed Colonoscopy            COLORECTAL CANCER SCREENING (COLONOSCOPY - Every 10 Years) Next due on 2025  SCANNED - COLONOSCOPY    2014  COLONOSCOPY (Done - negative)                      Last bone density scan (DEXA): None  Exercises Regularly: no  Feelings of Anxiety or Depression: no      Tobacco Usage?: No       Current Outpatient Medications:     amLODIPine (NORVASC) 5 MG tablet, Take 1 tablet by mouth Daily. For blood pressue, Disp: 90 tablet, Rfl: 1    doxycycline (VIBRAMYCIN) 100 MG capsule, Take 1 capsule by mouth 2 (Two) Times a Day., Disp: 20 capsule, Rfl: 0    DULoxetine (CYMBALTA) 60 MG capsule, Take 1 capsule by  mouth Every Night. For mood and pain, Disp: 90 capsule, Rfl: 1    fluconazole (Diflucan) 150 MG tablet, Take 1 tab po daily for yeast, Disp: 3 tablet, Rfl: 3    hydroCHLOROthiazide (HYDRODIURIL) 25 MG tablet, Take 1 tablet by mouth Daily. As needed for swelling, Disp: 90 tablet, Rfl: 1    metoprolol succinate XL (Toprol XL) 25 MG 24 hr tablet, Take 1 tablet by mouth Daily. For blood pressure, Disp: 90 tablet, Rfl: 1    nystatin (MYCOSTATIN) 782495 UNIT/GM cream, Apply 1 application topically to the appropriate area as directed 2 (Two) Times a Day., Disp: 30 g, Rfl: 10    nystatin-triamcinolone (MYCOLOG II) 157978-8.1 UNIT/GM-% cream, Apply 1 application topically to the appropriate area as directed 2 (Two) Times a Day., Disp: 30 g, Rfl: 5    pantoprazole (PROTONIX) 40 MG EC tablet, Take 1 tablet by mouth Daily. FOR ACID REFLUX, Disp: 90 tablet, Rfl: 1    Semaglutide,0.25 or 0.5MG/DOS, (Ozempic, 0.25 or 0.5 MG/DOSE,) 2 MG/3ML solution pen-injector, Inject 0.25 mg under the skin into the appropriate area as directed 1 (One) Time Per Week. For 4 weeks then 0.5mg weekly, Disp: 9 mL, Rfl: 1    traMADol (ULTRAM) 50 MG tablet, TAKE 1 TO 2 TABLETS BY MOUTH TWICE DAILY AS NEEDED FOR PAIN, Disp: , Rfl:     triamcinolone (KENALOG) 0.1 % cream, Apply 1 application topically to the appropriate area as directed 2 (Two) Times a Day., Disp: 30 g, Rfl: 10    valACYclovir (VALTREX) 500 MG tablet, Take 1 tablet by mouth Daily., Disp: 90 tablet, Rfl: 3    solifenacin (VESICARE) 10 MG tablet, Daily. (Patient not taking: Reported on 10/25/2023), Disp: , Rfl:     Patient denies the need for medication refills today.    OB History          3    Para   2    Term   2            AB   1    Living             SAB   1    IAB        Ectopic        Molar        Multiple        Live Births                    Past Medical History:   Diagnosis Date    Anxiety     Arthritis     On celebrex; steroid injections q3 months in back    Chronic  pain     BILATERAL KNEE; on cymbalta.    DDD (degenerative disc disease), lumbar     on celebrex    Dysphagia     EGD 2021- s/p esophageal dilatation    Frequent UTI     Gallbladder problem     Heartburn     chronic, episodic. Tums PRN. EGD 2021. No hx of h pylori    Herpes     HTN (hypertension)     previously on lisinopril and Toprol    IBS (irritable bowel syndrome)     Left knee pain     STEPHANIE on CPAP     PONV (postoperative nausea and vomiting)     Pregnancy     S/P laparoscopic cholecystectomy     gallstones    Urinary incontinence         Past Surgical History:   Procedure Laterality Date    HIP ARTHROPLASTY Left 2020    LAPAROSCOPIC CHOLECYSTECTOMY  1994    gallstones    TOTAL HIP ARTHROPLASTY Right 10/19/2018    Procedure: RIGHT TOTAL  ANTERIOR HIP ARTHROPLASTY;  Surgeon: Mary Be MD;  Location: [facility] MAIN OR;  Service: Orthopedics    TOTAL KNEE ARTHROPLASTY      bilateral    TOTAL KNEE ARTHROPLASTY REVISION Left 04/06/2021    Procedure: LEFT KNEE REVISION;  Surgeon: Mary Be MD;  Location: [facility] MAIN OR;  Service: Orthopedics;  Laterality: Left;       Health Maintenance   Topic Date Due    COVID-19 Vaccine (1) Never done    TDAP/TD VACCINES (1 - Tdap) Never done    ZOSTER VACCINE (1 of 2) Never done    HEPATITIS C SCREENING  Never done    INFLUENZA VACCINE  08/01/2023    MAMMOGRAM  10/25/2023    PAP SMEAR  11/01/2023    Annual Gynecologic Pelvic and Breast Exam  11/02/2023    ANNUAL PHYSICAL  01/23/2024    BMI FOLLOWUP  10/25/2024    COLORECTAL CANCER SCREENING  11/16/2025    Pneumococcal Vaccine 0-64  Aged Out       The additional following portions of the patient's history were reviewed and updated as appropriate: allergies, current medications, past family history, past medical history, past social history, and past surgical history.    Review of Systems   Genitourinary:  Positive for vaginal bleeding.       I have reviewed and agree with the HPI, ROS, and historical  "information as entered above. Redd Cavanaugh MD      Objective   /82   Ht 170.2 cm (67\")   Wt 117 kg (257 lb)   BMI 40.25 kg/m²     Physical Exam  Vitals and nursing note reviewed. Exam conducted with a chaperone present.   Constitutional:       Appearance: She is well-developed.   HENT:      Head: Normocephalic and atraumatic.   Neck:      Thyroid: No thyroid mass or thyromegaly.   Cardiovascular:      Rate and Rhythm: Normal rate and regular rhythm.      Heart sounds: No murmur heard.  Pulmonary:      Effort: Pulmonary effort is normal. No retractions.      Breath sounds: Normal breath sounds. No wheezing, rhonchi or rales.   Chest:      Chest wall: No mass or tenderness.   Breasts:     Right: Normal. No mass, nipple discharge, skin change or tenderness.      Left: Normal. No mass, nipple discharge, skin change or tenderness.   Abdominal:      General: Bowel sounds are normal.      Palpations: Abdomen is soft. Abdomen is not rigid. There is no mass.      Tenderness: There is no abdominal tenderness. There is no guarding.      Hernia: No hernia is present. There is no hernia in the left inguinal area.   Genitourinary:     Labia:         Right: No rash, tenderness or lesion.         Left: No rash, tenderness or lesion.       Vagina: Normal. No vaginal discharge or lesions.      Cervix: No cervical motion tenderness, discharge, lesion or cervical bleeding.      Uterus: Normal. Not enlarged, not fixed and not tender.       Adnexa:         Right: No mass or tenderness.          Left: No mass or tenderness.        Rectum: No external hemorrhoid.   Musculoskeletal:      Cervical back: Normal range of motion. No muscular tenderness.   Neurological:      Mental Status: She is alert and oriented to person, place, and time.   Psychiatric:         Behavior: Behavior normal.            Assessment and Plan    Problem List Items Addressed This Visit    None  Visit Diagnoses       Pap test, as part of routine " gynecological examination    -  Primary    Relevant Orders    LIQUID-BASED PAP SMEAR WITH HPV GENOTYPING IF ASCUS ([facility])    Mammo Screening Digital Tomosynthesis Bilateral With CAD        Occas bleeding q 6 mo and eval was neg--to have Knee surgery     GYN annual well woman exam.   Reviewed monthly self breast exams.  Instructed to call with lumps, pain, or breast discharge.  Yearly mammograms ordered.  Ordered mammogram today.  Recommended use of Vitamin D and getting adequate calcium in her diet. (1500mg)  Colonoscopy recommended.  Other: has occas spotin q 6 mo but US was nl before and we will obseve  Return in about 1 year (around 11/2/2024) for Annual physical.     Redd Cavanaugh MD  11/02/2023

## 2023-11-02 NOTE — DISCHARGE INSTRUCTIONS
Take the following medications the morning of surgery NONE      If you are on prescription narcotic pain medication to control your pain you may also take that medication the morning of surgery.    General Instructions:  Do not eat solid food after midnight the night before surgery.  You may drink clear liquids day of surgery but must stop at least one hour before your hospital arrival time.  It is beneficial for you to have a clear drink that contains carbohydrates the day of surgery.  We suggest a 12 to 20 ounce bottle of Gatorade or Powerade for non-diabetic patients or a 12 to 20 ounce bottle of G2 or Powerade Zero for diabetic patients. (Pediatric patients, are not advised to drink a 12 to 20 ounce carbohydrate drink)    Clear liquids are liquids you can see through.  Nothing red in color.     Plain water                               Sports drinks  Sodas                                   Gelatin (Jell-O)  Fruit juices without pulp such as white grape juice and apple juice  Popsicles that contain no fruit or yogurt  Tea or coffee (no cream or milk added)  Gatorade / Powerade  G2 / Powerade Zero    Infants may have breast milk up to four hours before surgery.  Infants drinking formula may drink formula up to six hours before surgery.   Patients who avoid smoking, chewing tobacco and alcohol for 4 weeks prior to surgery have a reduced risk of post-operative complications.  Quit smoking as many days before surgery as you can.  Do not smoke, use chewing tobacco or drink alcohol the day of surgery.   If applicable bring your C-PAP/ BI-PAP machine in with you to preop day of surgery.  Bring any papers given to you in the doctor’s office.  Wear clean comfortable clothes.  Do not wear contact lenses, false eyelashes or make-up.  Bring a case for your glasses.   Bring crutches or walker if applicable.  Remove all piercings.  Leave jewelry and any other valuables at home.  Hair extensions with metal clips must be removed  prior to surgery.  The Pre-Admission Testing nurse will instruct you to bring medications if unable to obtain an accurate list in Pre-Admission Testing.        If you were given a blood bank ID arm band remember to bring it with you the day of surgery.    Preventing a Surgical Site Infection:  For 2 to 3 days before surgery, avoid shaving with a razor because the razor can irritate skin and make it easier to develop an infection.    Any areas of open skin can increase the risk of a post-operative wound infection by allowing bacteria to enter and travel throughout the body.  Notify your surgeon if you have any skin wounds / rashes even if it is not near the expected surgical site.  The area will need assessed to determine if surgery should be delayed until it is healed.  The night prior to surgery shower using a fresh bar of anti-bacterial soap (such as Dial) and clean washcloth.  Sleep in a clean bed with clean clothing.  Do not allow pets to sleep with you.  Shower on the morning of surgery using a fresh bar of anti-bacterial soap (such as Dial) and clean washcloth.  Dry with a clean towel and dress in clean clothing.  Ask your surgeon if you will be receiving antibiotics prior to surgery.  Make sure you, your family, and all healthcare providers clean their hands with soap and water or an alcohol based hand  before caring for you or your wound.    Day of surgery:  Your arrival time is approximately two hours before your scheduled surgery time.  Upon arrival, a Pre-op nurse and Anesthesiologist will review your health history, obtain vital signs, and answer questions you may have.  The only belongings needed at this time will be a list of your home medications and if applicable your C-PAP/BI-PAP machine.  A Pre-op nurse will start an IV and you may receive medication in preparation for surgery, including something to help you relax.     Please be aware that surgery does come with discomfort.  We want to  make every effort to control your discomfort so please discuss any uncontrolled symptoms with your nurse.   Your doctor will most likely have prescribed pain medications.      If you are going home after surgery you will receive individualized written care instructions before being discharged.  A responsible adult must drive you to and from the hospital on the day of your surgery and stay with you for 24 hours.  Discharge prescriptions can be filled by the hospital pharmacy during regular pharmacy hours.  If you are having surgery late in the day/evening your prescription may be e-prescribed to your pharmacy.  Please verify your pharmacy hours or chose a 24 hour pharmacy to avoid not having access to your prescription because your pharmacy has closed for the day.    If you are staying overnight following surgery, you will be transported to your hospital room following the recovery period.  [facility] has all private rooms.    If you have any questions please call Pre-Admission Testing at [phone].  Deductibles and co-payments are collected on the day of service. Please be prepared to pay the required co-pay, deductible or deposit on the day of service as defined by your plan.    Call your surgeon immediately if you experience any of the following symptoms:  Sore Throat  Shortness of Breath or difficulty breathing  Cough  Chills  Body soreness or muscle pain  Headache  Fever  New loss of taste or smell  Do not arrive for your surgery ill.  Your procedure will need to be rescheduled to another time.  You will need to call your physician before the day of surgery to avoid any unnecessary exposure to hospital staff as well as other patients.

## 2023-11-03 LAB
QT INTERVAL: 377 MS
QTC INTERVAL: 416 MS
REF LAB TEST METHOD: NORMAL

## 2023-11-03 NOTE — PROGRESS NOTES
Cholesterol slightly elevated at 201 HDL slightly elevated at 63 and LDL slightly elevated at 121.  Rest of labs are basically normal

## 2023-11-07 ENCOUNTER — TELEPHONE (OUTPATIENT)
Dept: FAMILY MEDICINE CLINIC | Facility: CLINIC | Age: 58
End: 2023-11-07

## 2023-11-07 NOTE — TELEPHONE ENCOUNTER
Caller: Grace Jacob    Relationship to patient: Self    Best call back number: [phone]     Patient is needing: PATIENT SENT A MESSAGE ON [employer] ON 11.3.23 AND HAS NOT HEARD ANYTHING BACK.    PLEASE CALL BACK TO RELAY ANY NEEDED INFORMATION TO THE PATIENT.

## 2023-11-08 PROBLEM — T84.022A INSTABILITY OF INTERNAL RIGHT KNEE PROSTHESIS, INITIAL ENCOUNTER: Status: ACTIVE | Noted: 2023-11-08

## 2023-11-08 RX ORDER — CHLORHEXIDINE GLUCONATE 500 MG/1
CLOTH TOPICAL 2 TIMES DAILY
Status: CANCELLED | OUTPATIENT
Start: 2023-11-08

## 2023-11-08 RX ORDER — ACETAMINOPHEN 10 MG/ML
1000 INJECTION, SOLUTION INTRAVENOUS ONCE
Status: CANCELLED | OUTPATIENT
Start: 2023-11-10 | End: 2023-11-08

## 2023-11-10 ENCOUNTER — ANESTHESIA (OUTPATIENT)
Dept: PERIOP | Facility: HOSPITAL | Age: 58
End: 2023-11-10
Payer: COMMERCIAL

## 2023-11-10 ENCOUNTER — HOSPITAL ENCOUNTER (INPATIENT)
Facility: HOSPITAL | Age: 58
LOS: 1 days | Discharge: HOME HEALTH | DRG: 468 | End: 2023-11-11
Attending: ORTHOPAEDIC SURGERY | Admitting: ORTHOPAEDIC SURGERY
Payer: COMMERCIAL

## 2023-11-10 ENCOUNTER — APPOINTMENT (OUTPATIENT)
Dept: GENERAL RADIOLOGY | Facility: HOSPITAL | Age: 58
DRG: 468 | End: 2023-11-10
Payer: COMMERCIAL

## 2023-11-10 ENCOUNTER — ANESTHESIA EVENT (OUTPATIENT)
Dept: PERIOP | Facility: HOSPITAL | Age: 58
End: 2023-11-10
Payer: COMMERCIAL

## 2023-11-10 DIAGNOSIS — Z96.651 STATUS POST REVISION OF TOTAL KNEE REPLACEMENT, RIGHT: Primary | ICD-10-CM

## 2023-11-10 DIAGNOSIS — T84.012A FAILED TOTAL RIGHT KNEE REPLACEMENT: ICD-10-CM

## 2023-11-10 DIAGNOSIS — T84.022A INSTABILITY OF INTERNAL RIGHT KNEE PROSTHESIS, INITIAL ENCOUNTER: ICD-10-CM

## 2023-11-10 PROCEDURE — 25010000002 SUGAMMADEX 200 MG/2ML SOLUTION: Performed by: STUDENT IN AN ORGANIZED HEALTH CARE EDUCATION/TRAINING PROGRAM

## 2023-11-10 PROCEDURE — 25810000003 LACTATED RINGERS PER 1000 ML: Performed by: STUDENT IN AN ORGANIZED HEALTH CARE EDUCATION/TRAINING PROGRAM

## 2023-11-10 PROCEDURE — C9290 INJ, BUPIVACAINE LIPOSOME: HCPCS | Performed by: ORTHOPAEDIC SURGERY

## 2023-11-10 PROCEDURE — 0SRC0JZ REPLACEMENT OF RIGHT KNEE JOINT WITH SYNTHETIC SUBSTITUTE, OPEN APPROACH: ICD-10-PCS | Performed by: ORTHOPAEDIC SURGERY

## 2023-11-10 PROCEDURE — 25010000002 PROPOFOL 10 MG/ML EMULSION: Performed by: NURSE ANESTHETIST, CERTIFIED REGISTERED

## 2023-11-10 PROCEDURE — 25010000002 ACETAMINOPHEN 10 MG/ML SOLUTION: Performed by: STUDENT IN AN ORGANIZED HEALTH CARE EDUCATION/TRAINING PROGRAM

## 2023-11-10 PROCEDURE — 25010000002 FENTANYL CITRATE (PF) 50 MCG/ML SOLUTION: Performed by: STUDENT IN AN ORGANIZED HEALTH CARE EDUCATION/TRAINING PROGRAM

## 2023-11-10 PROCEDURE — 87070 CULTURE OTHR SPECIMN AEROBIC: CPT | Performed by: ORTHOPAEDIC SURGERY

## 2023-11-10 PROCEDURE — 25010000002 DEXAMETHASONE PER 1 MG: Performed by: NURSE ANESTHETIST, CERTIFIED REGISTERED

## 2023-11-10 PROCEDURE — C1776 JOINT DEVICE (IMPLANTABLE): HCPCS | Performed by: ORTHOPAEDIC SURGERY

## 2023-11-10 PROCEDURE — 25010000002 MIDAZOLAM PER 1 MG: Performed by: STUDENT IN AN ORGANIZED HEALTH CARE EDUCATION/TRAINING PROGRAM

## 2023-11-10 PROCEDURE — 25010000002 BUPIVACAINE 0.5 % SOLUTION: Performed by: ORTHOPAEDIC SURGERY

## 2023-11-10 PROCEDURE — 25010000002 MAGNESIUM SULFATE PER 500 MG OF MAGNESIUM: Performed by: NURSE ANESTHETIST, CERTIFIED REGISTERED

## 2023-11-10 PROCEDURE — 25010000002 HYDROMORPHONE PER 4 MG: Performed by: NURSE ANESTHETIST, CERTIFIED REGISTERED

## 2023-11-10 PROCEDURE — C1713 ANCHOR/SCREW BN/BN,TIS/BN: HCPCS | Performed by: ORTHOPAEDIC SURGERY

## 2023-11-10 PROCEDURE — 25010000002 ROPIVACAINE PER 1 MG: Performed by: STUDENT IN AN ORGANIZED HEALTH CARE EDUCATION/TRAINING PROGRAM

## 2023-11-10 PROCEDURE — 73560 X-RAY EXAM OF KNEE 1 OR 2: CPT

## 2023-11-10 PROCEDURE — 0SPC0JZ REMOVAL OF SYNTHETIC SUBSTITUTE FROM RIGHT KNEE JOINT, OPEN APPROACH: ICD-10-PCS | Performed by: ORTHOPAEDIC SURGERY

## 2023-11-10 PROCEDURE — 87075 CULTR BACTERIA EXCEPT BLOOD: CPT | Performed by: ORTHOPAEDIC SURGERY

## 2023-11-10 PROCEDURE — 25010000002 VANCOMYCIN 10 G RECONSTITUTED SOLUTION: Performed by: ORTHOPAEDIC SURGERY

## 2023-11-10 PROCEDURE — 87205 SMEAR GRAM STAIN: CPT | Performed by: ORTHOPAEDIC SURGERY

## 2023-11-10 PROCEDURE — 25810000003 SODIUM CHLORIDE 0.9 % SOLUTION: Performed by: ORTHOPAEDIC SURGERY

## 2023-11-10 PROCEDURE — 87176 TISSUE HOMOGENIZATION CULTR: CPT | Performed by: ORTHOPAEDIC SURGERY

## 2023-11-10 PROCEDURE — 25010000002 ONDANSETRON PER 1 MG: Performed by: STUDENT IN AN ORGANIZED HEALTH CARE EDUCATION/TRAINING PROGRAM

## 2023-11-10 PROCEDURE — 25010000002 CEFAZOLIN IN DEXTROSE 2-4 GM/100ML-% SOLUTION: Performed by: ORTHOPAEDIC SURGERY

## 2023-11-10 PROCEDURE — 25010000002 FENTANYL CITRATE (PF) 50 MCG/ML SOLUTION: Performed by: NURSE ANESTHETIST, CERTIFIED REGISTERED

## 2023-11-10 PROCEDURE — 0 BUPIVACAINE LIPOSOME 1.3 % SUSPENSION 20 ML VIAL: Performed by: ORTHOPAEDIC SURGERY

## 2023-11-10 DEVICE — FEMORAL DISTAL AUGMENT
Type: IMPLANTABLE DEVICE | Site: KNEE | Status: FUNCTIONAL
Brand: TRIATHLON

## 2023-11-10 DEVICE — DEV CONTRL TISS STRATAFIX PDS PLS OS6 REV SZ1 18IN 45CM: Type: IMPLANTABLE DEVICE | Site: KNEE | Status: FUNCTIONAL

## 2023-11-10 DEVICE — TRITANIUM CENTRAL FEMORAL CONE AUGMENT
Type: IMPLANTABLE DEVICE | Site: KNEE | Status: FUNCTIONAL
Brand: TRIATHLON

## 2023-11-10 DEVICE — FULL DOSE BONE CEMENT, 10 PACK CATALOG NUMBER IS 6191-1-010
Type: IMPLANTABLE DEVICE | Site: KNEE | Status: FUNCTIONAL
Brand: SIMPLEX

## 2023-11-10 DEVICE — FEMORAL POSTERIOR AUGMENT
Type: IMPLANTABLE DEVICE | Site: KNEE | Status: FUNCTIONAL
Brand: TRIATHLON

## 2023-11-10 DEVICE — TOTAL STABILIZER FEMORAL COMPONENT
Type: IMPLANTABLE DEVICE | Site: KNEE | Status: FUNCTIONAL
Brand: TRIATHLON

## 2023-11-10 DEVICE — TIBIAL BEARING INSERT - PS
Type: IMPLANTABLE DEVICE | Site: KNEE | Status: FUNCTIONAL
Brand: TRIATHLON

## 2023-11-10 DEVICE — CEMENTED STEM
Type: IMPLANTABLE DEVICE | Site: KNEE | Status: FUNCTIONAL
Brand: TRIATHLON

## 2023-11-10 DEVICE — FLUTED STEM
Type: IMPLANTABLE DEVICE | Site: KNEE | Status: FUNCTIONAL
Brand: TRIATHLON

## 2023-11-10 DEVICE — UNIVERSAL TIBIAL BASEPLATE
Type: IMPLANTABLE DEVICE | Site: KNEE | Status: FUNCTIONAL
Brand: TRIATHLON

## 2023-11-10 DEVICE — KNOTLESS TISSUE CONTROL DEVICE, UNDYED UNIDIRECTIONAL (ANTIBACTERIAL) SYNTHETIC ABSORBABLE DEVICE
Type: IMPLANTABLE DEVICE | Site: KNEE | Status: FUNCTIONAL
Brand: STRATAFIX

## 2023-11-10 DEVICE — TRITANIUM TIBIAL SYMMETRIC CONE AUGMENT
Type: IMPLANTABLE DEVICE | Site: KNEE | Status: FUNCTIONAL
Brand: TRIATHLON

## 2023-11-10 RX ORDER — ONDANSETRON 2 MG/ML
4 INJECTION INTRAMUSCULAR; INTRAVENOUS ONCE AS NEEDED
Status: DISCONTINUED | OUTPATIENT
Start: 2023-11-10 | End: 2023-11-10 | Stop reason: HOSPADM

## 2023-11-10 RX ORDER — DOCUSATE SODIUM 100 MG/1
100 CAPSULE, LIQUID FILLED ORAL 2 TIMES DAILY
Status: DISCONTINUED | OUTPATIENT
Start: 2023-11-10 | End: 2023-11-11 | Stop reason: HOSPADM

## 2023-11-10 RX ORDER — SODIUM CHLORIDE 9 MG/ML
100 INJECTION, SOLUTION INTRAVENOUS CONTINUOUS
Status: DISCONTINUED | OUTPATIENT
Start: 2023-11-10 | End: 2023-11-11 | Stop reason: HOSPADM

## 2023-11-10 RX ORDER — ROCURONIUM BROMIDE 10 MG/ML
INJECTION, SOLUTION INTRAVENOUS AS NEEDED
Status: DISCONTINUED | OUTPATIENT
Start: 2023-11-10 | End: 2023-11-10 | Stop reason: SURG

## 2023-11-10 RX ORDER — LIDOCAINE HYDROCHLORIDE 20 MG/ML
INJECTION, SOLUTION INFILTRATION; PERINEURAL AS NEEDED
Status: DISCONTINUED | OUTPATIENT
Start: 2023-11-10 | End: 2023-11-10 | Stop reason: SURG

## 2023-11-10 RX ORDER — BUPIVACAINE HYDROCHLORIDE 5 MG/ML
INJECTION, SOLUTION PERINEURAL AS NEEDED
Status: DISCONTINUED | OUTPATIENT
Start: 2023-11-10 | End: 2023-11-10 | Stop reason: HOSPADM

## 2023-11-10 RX ORDER — AMLODIPINE BESYLATE 5 MG/1
5 TABLET ORAL NIGHTLY
Status: DISCONTINUED | OUTPATIENT
Start: 2023-11-10 | End: 2023-11-11 | Stop reason: HOSPADM

## 2023-11-10 RX ORDER — FENTANYL CITRATE 50 UG/ML
50 INJECTION, SOLUTION INTRAMUSCULAR; INTRAVENOUS
Status: DISCONTINUED | OUTPATIENT
Start: 2023-11-10 | End: 2023-11-10 | Stop reason: HOSPADM

## 2023-11-10 RX ORDER — BISACODYL 5 MG/1
10 TABLET, DELAYED RELEASE ORAL DAILY PRN
Status: DISCONTINUED | OUTPATIENT
Start: 2023-11-11 | End: 2023-11-11 | Stop reason: HOSPADM

## 2023-11-10 RX ORDER — PANTOPRAZOLE SODIUM 40 MG/1
40 TABLET, DELAYED RELEASE ORAL DAILY PRN
Status: DISCONTINUED | OUTPATIENT
Start: 2023-11-10 | End: 2023-11-11 | Stop reason: HOSPADM

## 2023-11-10 RX ORDER — HYDROCODONE BITARTRATE AND ACETAMINOPHEN 7.5; 325 MG/1; MG/1
2 TABLET ORAL EVERY 4 HOURS PRN
Status: DISCONTINUED | OUTPATIENT
Start: 2023-11-10 | End: 2023-11-11 | Stop reason: HOSPADM

## 2023-11-10 RX ORDER — UREA 10 %
1 LOTION (ML) TOPICAL NIGHTLY PRN
Status: DISCONTINUED | OUTPATIENT
Start: 2023-11-10 | End: 2023-11-11 | Stop reason: HOSPADM

## 2023-11-10 RX ORDER — ROPIVACAINE HYDROCHLORIDE 5 MG/ML
INJECTION, SOLUTION EPIDURAL; INFILTRATION; PERINEURAL
Status: COMPLETED | OUTPATIENT
Start: 2023-11-10 | End: 2023-11-10

## 2023-11-10 RX ORDER — DROPERIDOL 2.5 MG/ML
0.62 INJECTION, SOLUTION INTRAMUSCULAR; INTRAVENOUS
Status: DISCONTINUED | OUTPATIENT
Start: 2023-11-10 | End: 2023-11-10 | Stop reason: HOSPADM

## 2023-11-10 RX ORDER — TRANEXAMIC ACID 100 MG/ML
INJECTION, SOLUTION INTRAVENOUS AS NEEDED
Status: DISCONTINUED | OUTPATIENT
Start: 2023-11-10 | End: 2023-11-10 | Stop reason: SURG

## 2023-11-10 RX ORDER — HYDROCODONE BITARTRATE AND ACETAMINOPHEN 7.5; 325 MG/1; MG/1
1 TABLET ORAL EVERY 4 HOURS PRN
Status: DISCONTINUED | OUTPATIENT
Start: 2023-11-10 | End: 2023-11-11 | Stop reason: HOSPADM

## 2023-11-10 RX ORDER — PROMETHAZINE HYDROCHLORIDE 25 MG/1
25 SUPPOSITORY RECTAL ONCE AS NEEDED
Status: DISCONTINUED | OUTPATIENT
Start: 2023-11-10 | End: 2023-11-10 | Stop reason: HOSPADM

## 2023-11-10 RX ORDER — HYDROCODONE BITARTRATE AND ACETAMINOPHEN 5; 325 MG/1; MG/1
1 TABLET ORAL ONCE AS NEEDED
Status: DISCONTINUED | OUTPATIENT
Start: 2023-11-10 | End: 2023-11-10 | Stop reason: HOSPADM

## 2023-11-10 RX ORDER — ACETAMINOPHEN 10 MG/ML
INJECTION, SOLUTION INTRAVENOUS AS NEEDED
Status: DISCONTINUED | OUTPATIENT
Start: 2023-11-10 | End: 2023-11-10 | Stop reason: SURG

## 2023-11-10 RX ORDER — IPRATROPIUM BROMIDE AND ALBUTEROL SULFATE 2.5; .5 MG/3ML; MG/3ML
3 SOLUTION RESPIRATORY (INHALATION) ONCE AS NEEDED
Status: DISCONTINUED | OUTPATIENT
Start: 2023-11-10 | End: 2023-11-10 | Stop reason: HOSPADM

## 2023-11-10 RX ORDER — PHENYLEPHRINE HCL IN 0.9% NACL 1 MG/10 ML
SYRINGE (ML) INTRAVENOUS AS NEEDED
Status: DISCONTINUED | OUTPATIENT
Start: 2023-11-10 | End: 2023-11-10 | Stop reason: SURG

## 2023-11-10 RX ORDER — CEFAZOLIN SODIUM 2 G/100ML
2000 INJECTION, SOLUTION INTRAVENOUS EVERY 8 HOURS
Qty: 200 ML | Refills: 0 | Status: COMPLETED | OUTPATIENT
Start: 2023-11-10 | End: 2023-11-11

## 2023-11-10 RX ORDER — MIDAZOLAM HYDROCHLORIDE 1 MG/ML
1 INJECTION INTRAMUSCULAR; INTRAVENOUS
Status: DISCONTINUED | OUTPATIENT
Start: 2023-11-10 | End: 2023-11-10 | Stop reason: HOSPADM

## 2023-11-10 RX ORDER — METOPROLOL SUCCINATE 25 MG/1
25 TABLET, EXTENDED RELEASE ORAL NIGHTLY
Status: DISCONTINUED | OUTPATIENT
Start: 2023-11-10 | End: 2023-11-11 | Stop reason: HOSPADM

## 2023-11-10 RX ORDER — MAGNESIUM SULFATE HEPTAHYDRATE 500 MG/ML
INJECTION, SOLUTION INTRAMUSCULAR; INTRAVENOUS AS NEEDED
Status: DISCONTINUED | OUTPATIENT
Start: 2023-11-10 | End: 2023-11-10 | Stop reason: SURG

## 2023-11-10 RX ORDER — MAGNESIUM HYDROXIDE 1200 MG/15ML
LIQUID ORAL AS NEEDED
Status: DISCONTINUED | OUTPATIENT
Start: 2023-11-10 | End: 2023-11-10 | Stop reason: HOSPADM

## 2023-11-10 RX ORDER — PROMETHAZINE HYDROCHLORIDE 25 MG/1
25 TABLET ORAL ONCE AS NEEDED
Status: DISCONTINUED | OUTPATIENT
Start: 2023-11-10 | End: 2023-11-10 | Stop reason: HOSPADM

## 2023-11-10 RX ORDER — HYDROMORPHONE HYDROCHLORIDE 1 MG/ML
0.5 INJECTION, SOLUTION INTRAMUSCULAR; INTRAVENOUS; SUBCUTANEOUS
Status: DISCONTINUED | OUTPATIENT
Start: 2023-11-10 | End: 2023-11-11 | Stop reason: HOSPADM

## 2023-11-10 RX ORDER — SODIUM CHLORIDE 0.9 % (FLUSH) 0.9 %
3 SYRINGE (ML) INJECTION EVERY 12 HOURS SCHEDULED
Status: DISCONTINUED | OUTPATIENT
Start: 2023-11-10 | End: 2023-11-10 | Stop reason: HOSPADM

## 2023-11-10 RX ORDER — OXYCODONE AND ACETAMINOPHEN 7.5; 325 MG/1; MG/1
1 TABLET ORAL EVERY 4 HOURS PRN
Status: DISCONTINUED | OUTPATIENT
Start: 2023-11-10 | End: 2023-11-10 | Stop reason: HOSPADM

## 2023-11-10 RX ORDER — SODIUM CHLORIDE 0.9 % (FLUSH) 0.9 %
3-10 SYRINGE (ML) INJECTION AS NEEDED
Status: DISCONTINUED | OUTPATIENT
Start: 2023-11-10 | End: 2023-11-10 | Stop reason: HOSPADM

## 2023-11-10 RX ORDER — ONDANSETRON 2 MG/ML
INJECTION INTRAMUSCULAR; INTRAVENOUS AS NEEDED
Status: DISCONTINUED | OUTPATIENT
Start: 2023-11-10 | End: 2023-11-10 | Stop reason: SURG

## 2023-11-10 RX ORDER — PROPOFOL 10 MG/ML
VIAL (ML) INTRAVENOUS AS NEEDED
Status: DISCONTINUED | OUTPATIENT
Start: 2023-11-10 | End: 2023-11-10 | Stop reason: SURG

## 2023-11-10 RX ORDER — NALOXONE HCL 0.4 MG/ML
0.2 VIAL (ML) INJECTION AS NEEDED
Status: DISCONTINUED | OUTPATIENT
Start: 2023-11-10 | End: 2023-11-10 | Stop reason: HOSPADM

## 2023-11-10 RX ORDER — ONDANSETRON 4 MG/1
4 TABLET, FILM COATED ORAL EVERY 6 HOURS PRN
Status: DISCONTINUED | OUTPATIENT
Start: 2023-11-10 | End: 2023-11-11 | Stop reason: HOSPADM

## 2023-11-10 RX ORDER — HYDRALAZINE HYDROCHLORIDE 20 MG/ML
5 INJECTION INTRAMUSCULAR; INTRAVENOUS
Status: DISCONTINUED | OUTPATIENT
Start: 2023-11-10 | End: 2023-11-10 | Stop reason: HOSPADM

## 2023-11-10 RX ORDER — HYDROMORPHONE HYDROCHLORIDE 1 MG/ML
0.5 INJECTION, SOLUTION INTRAMUSCULAR; INTRAVENOUS; SUBCUTANEOUS
Status: DISCONTINUED | OUTPATIENT
Start: 2023-11-10 | End: 2023-11-10 | Stop reason: HOSPADM

## 2023-11-10 RX ORDER — PREGABALIN 75 MG/1
75 CAPSULE ORAL ONCE
Status: COMPLETED | OUTPATIENT
Start: 2023-11-10 | End: 2023-11-10

## 2023-11-10 RX ORDER — FENTANYL CITRATE 50 UG/ML
INJECTION, SOLUTION INTRAMUSCULAR; INTRAVENOUS AS NEEDED
Status: DISCONTINUED | OUTPATIENT
Start: 2023-11-10 | End: 2023-11-10 | Stop reason: SURG

## 2023-11-10 RX ORDER — FLUMAZENIL 0.1 MG/ML
0.2 INJECTION INTRAVENOUS AS NEEDED
Status: DISCONTINUED | OUTPATIENT
Start: 2023-11-10 | End: 2023-11-10 | Stop reason: HOSPADM

## 2023-11-10 RX ORDER — LABETALOL HYDROCHLORIDE 5 MG/ML
5 INJECTION, SOLUTION INTRAVENOUS
Status: DISCONTINUED | OUTPATIENT
Start: 2023-11-10 | End: 2023-11-10 | Stop reason: HOSPADM

## 2023-11-10 RX ORDER — ASPIRIN 81 MG/1
81 TABLET ORAL EVERY 12 HOURS SCHEDULED
Status: DISCONTINUED | OUTPATIENT
Start: 2023-11-10 | End: 2023-11-11 | Stop reason: HOSPADM

## 2023-11-10 RX ORDER — ONDANSETRON 2 MG/ML
4 INJECTION INTRAMUSCULAR; INTRAVENOUS EVERY 6 HOURS PRN
Status: DISCONTINUED | OUTPATIENT
Start: 2023-11-10 | End: 2023-11-11 | Stop reason: HOSPADM

## 2023-11-10 RX ORDER — DEXAMETHASONE SODIUM PHOSPHATE 4 MG/ML
INJECTION, SOLUTION INTRA-ARTICULAR; INTRALESIONAL; INTRAMUSCULAR; INTRAVENOUS; SOFT TISSUE
Status: COMPLETED | OUTPATIENT
Start: 2023-11-10 | End: 2023-11-10

## 2023-11-10 RX ORDER — SODIUM CHLORIDE, SODIUM LACTATE, POTASSIUM CHLORIDE, CALCIUM CHLORIDE 600; 310; 30; 20 MG/100ML; MG/100ML; MG/100ML; MG/100ML
9 INJECTION, SOLUTION INTRAVENOUS CONTINUOUS
Status: DISCONTINUED | OUTPATIENT
Start: 2023-11-10 | End: 2023-11-10

## 2023-11-10 RX ORDER — DULOXETIN HYDROCHLORIDE 60 MG/1
60 CAPSULE, DELAYED RELEASE ORAL NIGHTLY
Status: DISCONTINUED | OUTPATIENT
Start: 2023-11-10 | End: 2023-11-11 | Stop reason: HOSPADM

## 2023-11-10 RX ORDER — CEFAZOLIN SODIUM 2 G/100ML
2 INJECTION, SOLUTION INTRAVENOUS ONCE
Status: COMPLETED | OUTPATIENT
Start: 2023-11-10 | End: 2023-11-10

## 2023-11-10 RX ORDER — CHLORHEXIDINE GLUCONATE 500 MG/1
CLOTH TOPICAL ONCE
Status: COMPLETED | OUTPATIENT
Start: 2023-11-10 | End: 2023-11-10

## 2023-11-10 RX ORDER — DIPHENHYDRAMINE HYDROCHLORIDE 50 MG/ML
12.5 INJECTION INTRAMUSCULAR; INTRAVENOUS
Status: DISCONTINUED | OUTPATIENT
Start: 2023-11-10 | End: 2023-11-10 | Stop reason: HOSPADM

## 2023-11-10 RX ORDER — NALOXONE HCL 0.4 MG/ML
0.1 VIAL (ML) INJECTION
Status: DISCONTINUED | OUTPATIENT
Start: 2023-11-10 | End: 2023-11-11 | Stop reason: HOSPADM

## 2023-11-10 RX ORDER — EPHEDRINE SULFATE 50 MG/ML
5 INJECTION, SOLUTION INTRAVENOUS ONCE AS NEEDED
Status: DISCONTINUED | OUTPATIENT
Start: 2023-11-10 | End: 2023-11-10 | Stop reason: HOSPADM

## 2023-11-10 RX ORDER — LIDOCAINE HYDROCHLORIDE 10 MG/ML
0.5 INJECTION, SOLUTION INFILTRATION; PERINEURAL ONCE AS NEEDED
Status: DISCONTINUED | OUTPATIENT
Start: 2023-11-10 | End: 2023-11-10 | Stop reason: HOSPADM

## 2023-11-10 RX ORDER — DEXAMETHASONE SODIUM PHOSPHATE 4 MG/ML
INJECTION, SOLUTION INTRA-ARTICULAR; INTRALESIONAL; INTRAMUSCULAR; INTRAVENOUS; SOFT TISSUE AS NEEDED
Status: DISCONTINUED | OUTPATIENT
Start: 2023-11-10 | End: 2023-11-10 | Stop reason: SURG

## 2023-11-10 RX ORDER — OXYBUTYNIN CHLORIDE 10 MG/1
10 TABLET, EXTENDED RELEASE ORAL DAILY
Status: DISCONTINUED | OUTPATIENT
Start: 2023-11-10 | End: 2023-11-11 | Stop reason: HOSPADM

## 2023-11-10 RX ADMIN — CEFAZOLIN SODIUM 2000 MG: 2 INJECTION, SOLUTION INTRAVENOUS at 22:03

## 2023-11-10 RX ADMIN — CHLORHEXIDINE GLUCONATE: 500 CLOTH TOPICAL at 12:18

## 2023-11-10 RX ADMIN — Medication 150 MCG: at 14:34

## 2023-11-10 RX ADMIN — DEXAMETHASONE SODIUM PHOSPHATE 4 MG: 4 INJECTION, SOLUTION INTRA-ARTICULAR; INTRALESIONAL; INTRAMUSCULAR; INTRAVENOUS; SOFT TISSUE at 13:24

## 2023-11-10 RX ADMIN — SODIUM CHLORIDE 100 ML/HR: 9 INJECTION, SOLUTION INTRAVENOUS at 22:18

## 2023-11-10 RX ADMIN — PROPOFOL 150 MG: 10 INJECTION, EMULSION INTRAVENOUS at 14:07

## 2023-11-10 RX ADMIN — METOPROLOL SUCCINATE 25 MG: 25 TABLET, EXTENDED RELEASE ORAL at 21:20

## 2023-11-10 RX ADMIN — FENTANYL CITRATE 50 MCG: 50 INJECTION, SOLUTION INTRAMUSCULAR; INTRAVENOUS at 17:26

## 2023-11-10 RX ADMIN — LIDOCAINE HYDROCHLORIDE 60 MG: 20 INJECTION, SOLUTION INFILTRATION; PERINEURAL at 14:07

## 2023-11-10 RX ADMIN — VANCOMYCIN HYDROCHLORIDE 1750 MG: 10 INJECTION, POWDER, LYOPHILIZED, FOR SOLUTION INTRAVENOUS at 12:34

## 2023-11-10 RX ADMIN — OXYCODONE AND ACETAMINOPHEN 1 TABLET: 7.5; 325 TABLET ORAL at 17:34

## 2023-11-10 RX ADMIN — FENTANYL CITRATE 50 MCG: 50 INJECTION, SOLUTION INTRAMUSCULAR; INTRAVENOUS at 17:41

## 2023-11-10 RX ADMIN — ROPIVACAINE HYDROCHLORIDE 15 ML: 5 INJECTION EPIDURAL; INFILTRATION; PERINEURAL at 13:24

## 2023-11-10 RX ADMIN — ROCURONIUM BROMIDE 50 MG: 10 INJECTION, SOLUTION INTRAVENOUS at 14:08

## 2023-11-10 RX ADMIN — HYDROMORPHONE HYDROCHLORIDE 0.5 MG: 1 INJECTION, SOLUTION INTRAMUSCULAR; INTRAVENOUS; SUBCUTANEOUS at 17:59

## 2023-11-10 RX ADMIN — DEXAMETHASONE SODIUM PHOSPHATE 10 MG: 4 INJECTION, SOLUTION INTRA-ARTICULAR; INTRALESIONAL; INTRAMUSCULAR; INTRAVENOUS; SOFT TISSUE at 14:12

## 2023-11-10 RX ADMIN — ROCURONIUM BROMIDE 10 MG: 10 INJECTION, SOLUTION INTRAVENOUS at 16:29

## 2023-11-10 RX ADMIN — PREGABALIN 75 MG: 75 CAPSULE ORAL at 12:18

## 2023-11-10 RX ADMIN — HYDROMORPHONE HYDROCHLORIDE 0.5 MG: 1 INJECTION, SOLUTION INTRAMUSCULAR; INTRAVENOUS; SUBCUTANEOUS at 17:34

## 2023-11-10 RX ADMIN — DULOXETINE HYDROCHLORIDE 60 MG: 60 CAPSULE, DELAYED RELEASE ORAL at 21:19

## 2023-11-10 RX ADMIN — FENTANYL CITRATE 50 MCG: 50 INJECTION, SOLUTION INTRAMUSCULAR; INTRAVENOUS at 16:50

## 2023-11-10 RX ADMIN — CEFAZOLIN SODIUM 2 G: 2 INJECTION, SOLUTION INTRAVENOUS at 13:52

## 2023-11-10 RX ADMIN — MAGNESIUM SULFATE HEPTAHYDRATE 2 G: 500 INJECTION, SOLUTION INTRAMUSCULAR; INTRAVENOUS at 14:12

## 2023-11-10 RX ADMIN — ONDANSETRON 4 MG: 2 INJECTION INTRAMUSCULAR; INTRAVENOUS at 16:41

## 2023-11-10 RX ADMIN — ASPIRIN 81 MG: 81 TABLET, COATED ORAL at 21:19

## 2023-11-10 RX ADMIN — DOCUSATE SODIUM 100 MG: 100 CAPSULE, LIQUID FILLED ORAL at 21:19

## 2023-11-10 RX ADMIN — ACETAMINOPHEN 1000 MG: 10 INJECTION, SOLUTION INTRAVENOUS at 15:41

## 2023-11-10 RX ADMIN — SODIUM CHLORIDE, POTASSIUM CHLORIDE, SODIUM LACTATE AND CALCIUM CHLORIDE 9 ML/HR: 600; 310; 30; 20 INJECTION, SOLUTION INTRAVENOUS at 13:00

## 2023-11-10 RX ADMIN — SUGAMMADEX 200 MG: 100 INJECTION, SOLUTION INTRAVENOUS at 16:45

## 2023-11-10 RX ADMIN — MIDAZOLAM 2 MG: 1 INJECTION INTRAMUSCULAR; INTRAVENOUS at 13:23

## 2023-11-10 RX ADMIN — HYDROCODONE BITARTRATE AND ACETAMINOPHEN 2 TABLET: 7.5; 325 TABLET ORAL at 21:18

## 2023-11-10 RX ADMIN — TRANEXAMIC ACID 1000 MG: 100 INJECTION INTRAVENOUS at 14:19

## 2023-11-10 RX ADMIN — Medication 100 MCG: at 14:24

## 2023-11-10 RX ADMIN — PROPOFOL 175 MCG/KG/MIN: 10 INJECTION, EMULSION INTRAVENOUS at 14:08

## 2023-11-10 NOTE — ANESTHESIA PROCEDURE NOTES
Peripheral Block    Pre-sedation assessment completed: 11/10/2023 1:23 PM    Patient reassessed immediately prior to procedure    Patient location during procedure: pre-op  Start time: 11/10/2023 1:24 PM  Stop time: 11/10/2023 1:30 PM  Reason for block: at surgeon's request and post-op pain management  Performed by  Anesthesiologist: Guilherme Fermin MD  Preanesthetic Checklist  Completed: patient identified, IV checked, site marked, risks and benefits discussed, surgical consent, monitors and equipment checked, pre-op evaluation and timeout performed  Prep:  Pt Position: supine  Sterile barriers:cap, washed/disinfected hands, gloves, mask and alcohol skin prep  Prep: ChloraPrep  Patient monitoring: blood pressure monitoring, continuous pulse oximetry and EKG  Procedure    Sedation: yes  Performed under: local infiltration  Guidance:ultrasound guided    ULTRASOUND INTERPRETATION.  Using ultrasound guidance a 21 G gauge needle was placed in close proximity to the nerve, at which point, under ultrasound guidance anesthetic was injected in the area of the nerve and spread of the anesthesia was seen on ultrasound in close proximity thereto.  There were no abnormalities seen on ultrasound; a digital image was taken; and the patient tolerated the procedure with no complications. Images:still images obtained, printed/placed on chart    Laterality:right  Block Type:adductor canal block  Injection Technique:single-shot  Needle Type:echogenic and Tuohy  Needle Gauge:21 G  Resistance on Injection: none    Medications Used: dexamethasone (DECADRON) injection - Injection   4 mg - 11/10/2023 1:24:00 PM  ropivacaine (NAROPIN) 0.5 % injection - Injection   15 mL - 11/10/2023 1:24:00 PM      Post Assessment  Injection Assessment: negative aspiration for heme, no paresthesia on injection and incremental injection  Patient Tolerance:comfortable throughout block  Complications:no  Additional Notes  Ultrasound guidance used to visualize  nerve anatomy, guide needle placement and verify local anesthetic disbursement.

## 2023-11-10 NOTE — DISCHARGE PLACEMENT REQUEST
"Grace Leija (58 y.o. Female)       Date of Birth   1965    Social Security Number       Address   [address] [place] [address]    Home Phone   [phone]    MRN   [id number]       Taoist   Anglican    Marital Status                               Admission Date   11/10/23    Admission Type   Elective    Admitting Provider   Mary Be MD    Attending Provider   Mary Be MD    Department, Room/Bed   [facility] OSC OR, [facility] OR/[facility]C OR       Discharge Date       Discharge Disposition       Discharge Destination                                 Attending Provider: Mary Be MD    Allergies: Penicillins    Isolation: None   Infection: None   Code Status: Prior    Ht: 170.2 cm (67\")   Wt: 117 kg (257 lb 15 oz)    Admission Cmt: None   Principal Problem: Instability of internal right knee prosthesis [T84.022A]                   Active Insurance as of 11/10/2023       Primary Coverage       Payor Plan Insurance Group Employer/Plan Group    [employer] EMPLOYEE [id number]       Payor Plan Address Payor Plan Phone Number Payor Plan Fax Number Effective Dates    [address] [phone]  1/1/2015 - None Entered    [facility] [id number]         Subscriber Name Subscriber Birth Date Member ID       GRACE LEIJA 1965 [id number]                     Emergency Contacts        (Rel.) Home Phone Work Phone Mobile Phone    Ant Leija (Spouse) [phone] -- [phone]                "

## 2023-11-10 NOTE — OP NOTE
ORTHOPAEDIC OPERATIVE NOTE    Patient: Grace Jacob       YOB: 1965    Medical Record Number: [id number]    Attending Physician: Mary Be,*    Primary Care Physician: Tiffanie Dickson PA-C    Date of Service: 11/10/2023    Surgeon: Mary Be MD        DATE OF PROCEDURE: 11/10/2023    PREOPERATIVE DIAGNOSIS: 1. 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    POSTOPERATIVE DIAGNOSIS: 1. 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    PROCEDURE PERFORMED: RIGHT TOTAL KNEE ARTHROPLASTY REVISION  1.   Revision RIGHT total knee arthroplasty by utilizing the ClickOnn revision system with a     Surgical Approach: Knee Medial Parapatellar     Tibia  Universal  baseplate size 3 -   Tibia Cone Symmetric Size B ,   Universal stem fluted 12 mm × 50 mm for tibia.  Femur    The total stabilizer component size 4,   Distal femur augments 10 mm thickness  Medial and lateral ,  Femur Cone augment Size 3   fluted stem 14 mm ×100 mm length for the femur.    Posterior stabilizer tibial insert size # 3, 13 mm thickness.    Bone cement with vancomycin was utilized for additional stabilization of the implants.    Implant Name Type Inv. Item Serial No.  Lot No. LRB No. Used Action   DEV CONTRL TISS STRATAFIX PDS PLS OS6 REV SZ1 18IN 45CM - [id number] Implant DEV CONTRL TISS STRATAFIX PDS PLS OS6 REV SZ1 18IN 45CM  [employer]  DIV OF J AND J SHMBBD Right 1 Implanted   CMT BONE SIMPLEX/P FULL DOSE 10/PK - [id number] Implant CMT BONE SIMPLEX/P FULL DOSE 10/PK  GUILLERMINA AUDELIA [id number] Right 1 Implanted   CMT BONE SIMPLEX/P FULL DOSE 10/PK - [id number] Implant CMT BONE SIMPLEX/P FULL DOSE 10/PK  GUILLERMINA AUDELIA [id number] Right 2 Implanted   CONE AUG FEM/KN TRIATHLON BQ3JBK6 RT - [id number] Implant CONE AUG FEM/KN TRIATHLON [id number] RT  GUILLERMINA UADELIA  Right 1 Implanted   AUG CONE TIB/KN TRIATHLON REV  SYMM SZB - [id number] Implant AUG CONE TIB/KN TRIATHLON REV SYMM SZB  GUILLERMINA AUDELIA [id number] Right 1 Implanted   STEM FEM TRIATH CMT [id number] - [id number] Implant STEM FEM TRIATH CMT [id number]  GUILLERMINA AUDELIA [id number] Right 1 Implanted   BASEPLT TIB TRIATH TS NO3 - [id number] Implant BASEPLT TIB TRIATH TS NO3  GUILLERMINA AUDELIA [id number] Right 1 Implanted   COMP FEM TRIATH TS SZ4 RT - [id number] Implant COMP FEM TRIATH TS SZ4 RT  GUILLERMINA AUDELIA [id number] Right 1 Implanted   STEM CMTLS FEM/KN TRIATHLON TS W/ENDCAP [id number] - [id number] Implant STEM CMTLS FEM/KN TRIATHLON TS W/ENDCAP [id number]  GUILLERMINA AUDELIA [id number] Right 1 Implanted   AUG FEM/KN TRIATHLON TOTLSTBL DIST SZ4 10MM RT - [id number] Implant AUG FEM/KN TRIATHLON TOTLSTBL DIST SZ4 10MM RT  GUILLERMINA AUDELIA [id number] Right 1 Implanted   AUG FEM/KN TRIATHLON TOTLSTBL DIST SZ4 10MM RT - [id number] Implant AUG FEM/KN TRIATHLON TOTLSTBL DIST SZ4 10MM RT  GUILLERMINA AUDELIA [id number] Right 1 Implanted   AUG FEM/KN TRIATHLON REV POST SZ4 5MM - [id number] Implant AUG FEM/KN TRIATHLON REV POST SZ4 5MM  GIULLERMINA AUDELIA [id number] Right 1 Implanted   INSRT TIB/KN TRIATHLON PS X3 NMBR3 13MM - [id number] Implant INSRT TIB/KN TRIATHLON PS X3 NMBR3 13MM  GUILLERMINA AUDELIA [id number] Right 1 Implanted   DEV CONTRL TISS STRATAFIX PDS PLS OS6 REV SZ1 18IN 45CM - [id number] Implant DEV CONTRL TISS STRATAFIX PDS PLS OS6 REV SZ1 18IN 45CM  ETH[employer]ON  DIV OF J AND FRANCI SHMBBD Right 1 Implanted   DEV CONTRL TISS STRATAFIXSPIRALMNCRYL PLSPS2 REV3/0 45CM - [id number] Implant DEV CONTRL TISS STRATAFIXSPIRALMNCRYL PLSPS2 REV3/0 45CM  ETH[employer]ON  DIV OF J AND FRANCI [id number] Right 1 Implanted       SURGEON: Mary Be MD     ASSISTANT:  Ozzie Sanchez MD, Fellow    ANESTHESIA: General anaesthesia with a regional block femoral-sciatic and intraoperative periarticular  Exparel injection.    ESTIMATED BLOOD LOSS: 200ml    SPECIMENS: 1.Tissue from  knee for culture sensitivity aerobic and anaerobic.      COMPLICATIONS: Nil.     DRAINS: A 10 Polish  Hemovac drain.     INDICATIONS: Grace is a 58-year-old female who comes into the office today with complaints of pain and instability in the right knee. She did have severe buckling of the right knee about 3 weeks back. This happened while she was in [place]. She did walk for several hours at the mall. Next day she had this locking/buckling episode. Followed by this she had severe pain on the front of her right knee.     She has tried outpatient physical therapy but this is not helping the knee. She is scheduled for a revision right total knee replacement for November 2023.  She would like to proceed with the revision knee replacement.     She has had recurrent episodes of instability and has seen me on several occasions in the past for this.  Her most recent difficulty was trying to get up from the toilet seat and she had severe pain in the right knee.     Her history is significant for a right total knee replacement by Dr. Wesley Cuellar March 2020.     Since then she has been noticing increasing instability.     She has always felt some instability in the right knee and some difficulty in walking distances.     She is known to me from her revision left total knee replacement.     Denies any history of MRSA, DVT, cardiac problems.      Unfortunately she continues to have several episodes of instability.  She has failed physical therapy and nonoperative measures.     Ideally she is a candidate for a right knee revision total knee replacement.     Options and alternatives were discussed in detail with the patient.  The patient has reached a point of disability and has failed nonoperative management. The patient is indicated for a revision right total knee arthroplasty.     Likely, Risks and benefits of the procedure including but not limited to infection, DVT, pulmonary embolism, stiffness, future loosening of the implants, possibility of injury to nerves vessels and tendons, periprosthetic fractures have been  discussed in detail. Despite the risks involved, The patient would like to proceed.  The patient is being scheduled for a revision right total knee arthroplasty at [facility] on November 10, 2023.     I will request for Medical and cardiac clearance from her primary care provider Tiffanie Dickson  Postoperative DVT prophylaxis - Patient has no high risk factors Plan for ASPIRIN .  Preoperative antibiotic prophylaxis - Plan for SCIP protocol with CEFAZOLIN weight based. Will give VANCOMYCIN in addition due to revision surgery.  Surgery will be scheduled for inpatient status due to medical comorbidities.      Treatment options and alternatives were discussed in detail with the patient who is indicated for a revision total knee arthroplasty.     Likely risks and benefits of the procedure, including but not limited to infection, DVT, pulmonary embolism, future loosening of the implants, possibility of injury to tendons, ligaments, nerves or vessels and periprosthetic fractures, loss of extensor mechanism, stiffness, future above-the-knee amputation have been discussed in detail. Despite the risks involved, the patient elected to proceed and informed consent was obtained and the patient was scheduled for surgery. The patient was seen in the preoperative holding area and the operative site was marked.       DESCRIPTION OF PROCEDURE:   The patient was transferred to [facility] operating room.     Preoperative antibiotics in the form of  Vancomycin and  Kefzol  intravenously was infused prior to the incision and prior to the tourniquet placement according to the SCIP protocol.     A surgical time out was done with the team and the correct patient, surgical side and site were identified.     After achieving adequate general anesthesia, a well-padded tourniquet was placed over the proximal aspect of the operative thigh. The operative leg was prepped and draped in the usual sterile fashion. Tourniquet was  elevated to a pressure of 250 mm Hg.     Trannexamic acid was given intravenously prior to incision.      A skin incision was made vertically oriented centering over the patella anteriorly incorporating previous surgical scar. Skin and subcutaneous tissue were incised, full thickness flaps were raised and a medial parapatellar approach was developed. There was a moderate effusion. The patella was subluxed and the knee was inspected.      She had a cruciate retaining [employer] primary knee replacement.  There was significant medial and lateral instability and anteroposterior laxity.  The medial collateral ligament was intact and there was a good endpoint.  I removed the polyethylene liner.  This measured 10 mm thick.  Medial and lateral gutters were released off scar tissue and partial synovectomy was done.  Tissue was sent for culture sensitivity.  The tibia and femur were found to be well fixed to the underlying bone.  The patella was also found to be well fixed to the underlying bone.  The patella was tracking mildly laterally.  I elected to revise the implants by removing both tibia and femur.  There was global instability.  Reciprocating saw and flexible osteotome was utilized and I was able to remove the femur implant followed by the tibial implant without any bone loss.  All previous cement was removed.    As the knee tissue was looking very benign  and as the  inflammatory markers were negative it was planned to proceed with reimplantation.  The knee joint was thoroughly irrigated with saline.  All traces of previous cement was removed.  Debridement was completed.    Attention was then directed to the proximal tibia.  Intramedullary reamer was utilized progressively.  A   a conical proximal reamer was seated for adequate depth for a cone.  A trial cone was seated to achieve adequate metaphyseal stability.  A skim cut was done with an oscillating saw along the proximal end of the tibia.  The proximal  tibial cut was found to be satisfactory. Trial baseplate was sized.      Attention was  directed to the distal femur.  Intramedullary reamer was utilized for the medullary canal followed by a  Reamer for the femur cone .  Adequate stability was found with a metaphyseal  Size 3 cone.  4-in-1 cutting block was utilized followed by cutting the box cut for the TS component.  The cuts were completed maintaining correct rotational alignment based upon epicondylar axis.      Trial liner was seated and knee reduced.  Reduction was found to be satisfactory with range of motion from 0° to 120° with the patella tracking well.     Having been satisfied with the trials, the bony surfaces irrigated with saline.  I have elected to proceed with press-fit cones. Exparel was infiltrated into the posterior capsule medially and laterally and into periarticular tissue and subcutaneously. The tibia and femur were seated appropriately. Followed by this, the tibial component was seated into position followed by the femoral component, followed by seating of the 13 mm thick trial liner.  The implants were assembled on the back table.  I used Simplex cement for obtaining some support of the component to the bone secondary to bone loss.  I used metaphyseal cementation technique. The cement was allowed to set excess cement was removed.  The trial liner was replaced with a 13  mm posterior stabilized liner.     Again, range of motion was checked and the range was satisfactory from 0° to 120° with excellent stability throughout the range of motion. Good medial and lateral tissue tension, and soft tissue balancing. The patella was tracking well. Having been satisfied with this, the joint was thoroughly irrigated with saline. Soft tissue hemostasis was secured. A 10-Taiwanese Hemovac drain was placed.      The sponge and needle count was found to be correct.  Arthrotomy was closed with Ethibond sutures followed by closure of the incision in layers  with Vicryl sutures and staples. Sterile dressings were placed and the patient was transferred to the recovery room in stable condition. The patient was given a knee immobilizer. The patient tolerated the procedure well and is being admitted for postoperative antibiotics according to the SCIP protocol for 2 more doses /  until I see culture result.     DVT Prophylaxis -  Mechanical - TEDS and venous foot plexipulses and aspirin 81 mg 2 times daily will be started daily on postoperative day #1.     The patient will be mobilized in am with physical therapy. WBAT and no restrictions for ROM.      I discussed the satisfactory performance of the procedure with the patient's family and discussed with them The postoperative management.      Mary Be M.D.    11/10/2023    CC: Tiffanie Dickson PA-C; MD Indiana Cohen, Mary GIRON,*

## 2023-11-10 NOTE — ANESTHESIA PROCEDURE NOTES
Airway  Urgency: elective    Date/Time: 11/10/2023 2:10 PM  Airway not difficult    General Information and Staff    Patient location during procedure: OR  CRNA/CAA: Uyen Franco CRNA    Indications and Patient Condition    Preoxygenated: yes  Mask difficulty assessment: 1 - vent by mask    Final Airway Details  Final airway type: endotracheal airway      Successful airway: ETT  Cuffed: yes   Successful intubation technique: direct laryngoscopy  Endotracheal tube insertion site: oral  Blade: Polo  Blade size: 3  ETT size (mm): 7.0  Cormack-Lehane Classification: grade IIa - partial view of glottis  Placement verified by: chest auscultation and capnometry   Measured from: teeth  ETT/EBT  to teeth (cm): 21  Number of attempts at approach: 1  Assessment: lips, teeth, and gum same as pre-op and atraumatic intubation    Additional Comments  Teeth, tongue, lips, and gums in preop condition. VSS throughout. Easy mask/smooth easy airway. Tube visualized through cords.

## 2023-11-10 NOTE — ANESTHESIA PREPROCEDURE EVALUATION
Anesthesia Evaluation     Patient summary reviewed and Nursing notes reviewed   history of anesthetic complications:  PONV  NPO Solid Status: > 8 hours  NPO Liquid Status: > 2 hours           Airway   Mallampati: II  TM distance: >3 FB  Neck ROM: full  Dental      Pulmonary    (+) ,sleep apnea  Cardiovascular     (+) hypertension    ROS comment: Echo 2 years ago w/ EF 70% and moderate TR    Neuro/Psych  GI/Hepatic/Renal/Endo    (+) morbid obesity, GERD    Musculoskeletal     Abdominal   (+) obese   Substance History      OB/GYN          Other                      Anesthesia Plan    ASA 3     general with block   total IV anesthesia  intravenous induction     Anesthetic plan, risks, benefits, and alternatives have been provided, discussed and informed consent has been obtained with: patient.      CODE STATUS:

## 2023-11-10 NOTE — ANESTHESIA POSTPROCEDURE EVALUATION
Patient: Grace Jacob    Procedure Summary       Date: 11/10/23 Room / Location:  [facility]  /  [facility]    Anesthesia Start: 1359 Anesthesia Stop:     Procedure: TOTAL KNEE ARTHROPLASTY REVISION (Right: Knee) Diagnosis:     Surgeons: Mary Be MD Provider: Guilherme Fermin MD    Anesthesia Type: general with block ASA Status: 3            Anesthesia Type: general with block    Vitals  Vitals Value Taken Time   /89 11/10/23 1720   Temp 36.8 °C (98.2 °F) 11/10/23 1715   Pulse 77 11/10/23 1724   Resp 18 11/10/23 1720   SpO2 99 % 11/10/23 1724   Vitals shown include unfiled device data.        Post Anesthesia Care and Evaluation    Patient location during evaluation: bedside  Patient participation: complete - patient participated  Level of consciousness: awake and alert  Pain management: adequate    Airway patency: patent  Anesthetic complications: No anesthetic complications  PONV Status: controlled  Cardiovascular status: blood pressure returned to baseline and acceptable  Respiratory status: acceptable  Hydration status: acceptable

## 2023-11-11 VITALS
TEMPERATURE: 98.3 F | HEART RATE: 78 BPM | BODY MASS INDEX: 40.48 KG/M2 | RESPIRATION RATE: 16 BRPM | SYSTOLIC BLOOD PRESSURE: 115 MMHG | OXYGEN SATURATION: 98 % | DIASTOLIC BLOOD PRESSURE: 77 MMHG | WEIGHT: 257.94 LBS | HEIGHT: 67 IN

## 2023-11-11 PROBLEM — T84.022A INSTABILITY OF INTERNAL RIGHT KNEE PROSTHESIS, INITIAL ENCOUNTER: Status: RESOLVED | Noted: 2023-11-08 | Resolved: 2023-11-11

## 2023-11-11 LAB
ANION GAP SERPL CALCULATED.3IONS-SCNC: 9 MMOL/L (ref 5–15)
BASOPHILS # BLD AUTO: 0.04 10*3/MM3 (ref 0–0.2)
BASOPHILS NFR BLD AUTO: 0.2 % (ref 0–1.5)
BUN SERPL-MCNC: 12 MG/DL (ref 6–20)
BUN/CREAT SERPL: 15.8 (ref 7–25)
CALCIUM SPEC-SCNC: 8.8 MG/DL (ref 8.6–10.5)
CHLORIDE SERPL-SCNC: 102 MMOL/L (ref 98–107)
CO2 SERPL-SCNC: 25 MMOL/L (ref 22–29)
CREAT SERPL-MCNC: 0.76 MG/DL (ref 0.57–1)
DEPRECATED RDW RBC AUTO: 44.3 FL (ref 37–54)
EGFRCR SERPLBLD CKD-EPI 2021: 90.4 ML/MIN/1.73
EOSINOPHIL # BLD AUTO: 0 10*3/MM3 (ref 0–0.4)
EOSINOPHIL NFR BLD AUTO: 0 % (ref 0.3–6.2)
ERYTHROCYTE [DISTWIDTH] IN BLOOD BY AUTOMATED COUNT: 12.6 % (ref 12.3–15.4)
GLUCOSE SERPL-MCNC: 244 MG/DL (ref 65–99)
HCT VFR BLD AUTO: 36.6 % (ref 34–46.6)
HGB BLD-MCNC: 12.3 G/DL (ref 12–15.9)
IMM GRANULOCYTES # BLD AUTO: 0.1 10*3/MM3 (ref 0–0.05)
IMM GRANULOCYTES NFR BLD AUTO: 0.5 % (ref 0–0.5)
LYMPHOCYTES # BLD AUTO: 0.84 10*3/MM3 (ref 0.7–3.1)
LYMPHOCYTES NFR BLD AUTO: 4.4 % (ref 19.6–45.3)
MCH RBC QN AUTO: 32.3 PG (ref 26.6–33)
MCHC RBC AUTO-ENTMCNC: 33.6 G/DL (ref 31.5–35.7)
MCV RBC AUTO: 96.1 FL (ref 79–97)
MONOCYTES # BLD AUTO: 0.41 10*3/MM3 (ref 0.1–0.9)
MONOCYTES NFR BLD AUTO: 2.1 % (ref 5–12)
NEUTROPHILS NFR BLD AUTO: 17.81 10*3/MM3 (ref 1.7–7)
NEUTROPHILS NFR BLD AUTO: 92.8 % (ref 42.7–76)
NRBC BLD AUTO-RTO: 0 /100 WBC (ref 0–0.2)
PLATELET # BLD AUTO: 303 10*3/MM3 (ref 140–450)
PMV BLD AUTO: 9.6 FL (ref 6–12)
POTASSIUM SERPL-SCNC: 5.3 MMOL/L (ref 3.5–5.2)
RBC # BLD AUTO: 3.81 10*6/MM3 (ref 3.77–5.28)
SODIUM SERPL-SCNC: 136 MMOL/L (ref 136–145)
WBC NRBC COR # BLD: 19.2 10*3/MM3 (ref 3.4–10.8)

## 2023-11-11 PROCEDURE — 80048 BASIC METABOLIC PNL TOTAL CA: CPT | Performed by: ORTHOPAEDIC SURGERY

## 2023-11-11 PROCEDURE — 97530 THERAPEUTIC ACTIVITIES: CPT

## 2023-11-11 PROCEDURE — 85025 COMPLETE CBC W/AUTO DIFF WBC: CPT | Performed by: ORTHOPAEDIC SURGERY

## 2023-11-11 PROCEDURE — 25010000002 CEFAZOLIN IN DEXTROSE 2-4 GM/100ML-% SOLUTION: Performed by: ORTHOPAEDIC SURGERY

## 2023-11-11 PROCEDURE — 97110 THERAPEUTIC EXERCISES: CPT

## 2023-11-11 PROCEDURE — 97162 PT EVAL MOD COMPLEX 30 MIN: CPT

## 2023-11-11 RX ORDER — PSEUDOEPHEDRINE HCL 30 MG
100 TABLET ORAL 2 TIMES DAILY
Qty: 30 CAPSULE | Refills: 0 | Status: SHIPPED | OUTPATIENT
Start: 2023-11-11 | End: 2023-11-26

## 2023-11-11 RX ORDER — ASPIRIN 81 MG/1
81 TABLET ORAL EVERY 12 HOURS SCHEDULED
Qty: 60 TABLET | Refills: 0 | Status: SHIPPED | OUTPATIENT
Start: 2023-11-11 | End: 2023-12-11

## 2023-11-11 RX ORDER — BISACODYL 5 MG/1
10 TABLET, DELAYED RELEASE ORAL DAILY PRN
Qty: 10 TABLET | Refills: 0 | Status: SHIPPED | OUTPATIENT
Start: 2023-11-11 | End: 2023-11-20

## 2023-11-11 RX ORDER — ONDANSETRON 4 MG/1
4 TABLET, FILM COATED ORAL EVERY 6 HOURS PRN
Qty: 30 TABLET | Refills: 0 | Status: SHIPPED | OUTPATIENT
Start: 2023-11-11

## 2023-11-11 RX ORDER — HYDROCODONE BITARTRATE AND ACETAMINOPHEN 7.5; 325 MG/1; MG/1
1 TABLET ORAL EVERY 4 HOURS PRN
Qty: 24 TABLET | Refills: 0 | Status: SHIPPED | OUTPATIENT
Start: 2023-11-11 | End: 2023-11-20

## 2023-11-11 RX ADMIN — HYDROCODONE BITARTRATE AND ACETAMINOPHEN 2 TABLET: 7.5; 325 TABLET ORAL at 12:04

## 2023-11-11 RX ADMIN — DOCUSATE SODIUM 100 MG: 100 CAPSULE, LIQUID FILLED ORAL at 09:28

## 2023-11-11 RX ADMIN — OXYBUTYNIN CHLORIDE 10 MG: 10 TABLET, EXTENDED RELEASE ORAL at 09:28

## 2023-11-11 RX ADMIN — HYDROCODONE BITARTRATE AND ACETAMINOPHEN 2 TABLET: 7.5; 325 TABLET ORAL at 05:22

## 2023-11-11 RX ADMIN — CEFAZOLIN SODIUM 2000 MG: 2 INJECTION, SOLUTION INTRAVENOUS at 05:23

## 2023-11-11 RX ADMIN — ASPIRIN 81 MG: 81 TABLET, COATED ORAL at 09:28

## 2023-11-11 NOTE — THERAPY EVALUATION
Patient Name: Grace Jacob  : 1965    MRN: [id number]                              Today's Date: 2023       Admit Date: 11/10/2023    Visit Dx:     ICD-10-CM ICD-9-CM   1. Status post revision of total knee replacement, right  Z96.651 V43.65   2. Instability of internal right knee prosthesis, initial encounter  T84.022A 996.42     V43.65   3. Failed total right knee replacement  T84.012A 996.47     V43.65     Patient Active Problem List   Diagnosis    Obesity (BMI 30-39.9)    Anxiety    PONV (postoperative nausea and vomiting)    Status post revision of total knee replacement, left    Chronic pain    Chest pain, atypical    Shortness of breath    HTN (hypertension)    STEPHANIE on CPAP    Heartburn    Dysphagia    IBS (irritable bowel syndrome)    S/P laparoscopic cholecystectomy    PONV (postoperative nausea and vomiting)    Urinary incontinence    DDD (degenerative disc disease), lumbar    Arthritis    Frequent UTI    Preop cardiovascular exam    Mild depression    Morbid (severe) obesity due to excess calories    Preop testing    Chronic GERD    Physical exam    Prediabetes    Candidal intertrigo    Elevated liver enzymes    Lipedema    Lymphedema associated with obesity    Elevated blood pressure reading    Lower leg edema    Chronic right sacroiliac joint pain    History of total right knee replacement    Lumbar spondylosis    Lumbosacral radiculitis    Mechanical complication of internal joint prosthesis    Lumbar radiculopathy    Radiculopathy of leg    Spinal stenosis of lumbar region    Stress incontinence    Acute bronchitis    Status post revision of total knee replacement, right     Past Medical History:   Diagnosis Date    Anxiety     Arthritis     On celebrex; steroid injections q3 months in back    Chronic pain     BILATERAL KNEE; on cymbalta.    DDD (degenerative disc disease), lumbar     on celebrex    Dysphagia     EGD - s/p esophageal dilatation    Frequent UTI     Gallbladder  problem     GERD (gastroesophageal reflux disease)     Heartburn     chronic, episodic. Tums PRN. EGD 2021. No hx of h pylori    Herpes     HTN (hypertension)     previously on lisinopril and Toprol    IBS (irritable bowel syndrome)     Left knee pain     STEPHANIE on CPAP     PONV (postoperative nausea and vomiting)     Pregnancy     S/P laparoscopic cholecystectomy     gallstones    Urinary incontinence     Wound of left leg     SCRAPPED ON BEDFRAME HALF OF A DIME IN SIZE. PT TO INFORM SURGEONS OFFICE     Past Surgical History:   Procedure Laterality Date    HIP ARTHROPLASTY Left 2020    LAPAROSCOPIC CHOLECYSTECTOMY  1994    gallstones    TOTAL HIP ARTHROPLASTY Right 10/19/2018    Procedure: RIGHT TOTAL  ANTERIOR HIP ARTHROPLASTY;  Surgeon: Mary Be MD;  Location: [facility] OR;  Service: Orthopedics    TOTAL KNEE ARTHROPLASTY      bilateral    TOTAL KNEE ARTHROPLASTY REVISION Left 04/06/2021    Procedure: LEFT KNEE REVISION;  Surgeon: Mary Be MD;  Location: [facility] OR;  Service: Orthopedics;  Laterality: Left;      General Information       Row Name 11/11/23 1300          Physical Therapy Time and Intention    Document Type evaluation  -PAULINA     Mode of Treatment individual therapy;physical therapy  -PAULINA       Row Name 11/11/23 1300          General Information    Patient Profile Reviewed yes  -PAULINA     Existing Precautions/Restrictions fall  -PAULINA     Barriers to Rehab none identified  -PAULINA       Row Name 11/11/23 1300          Living Environment    People in Home spouse  -PAULINA     Name(s) of People in Home Ant Louise  -PAULINA       Row Name 11/11/23 1300          Home Main Entrance    Number of Stairs, Main Entrance four  -PAULINA     Stair Railings, Main Entrance railing on left side (ascending)  -PAULINA       Row Name 11/11/23 1300          Stairs Within Home, Primary    Number of Stairs, Within Home, Primary twelve  -PAULINA     Stairs Comment, Within Home, Primary 2 story home but patient is not required to  upstairs  -PAULINA       Row Name 11/11/23 1300          Cognition    Orientation Status (Cognition) oriented x 4  -PAULINA       Row Name 11/11/23 1300          Safety Issues, Functional Mobility    Safety Issues Affecting Function (Mobility) awareness of need for assistance;insight into deficits/self-awareness;positioning of assistive device  -PAULINA     Impairments Affecting Function (Mobility) balance;endurance/activity tolerance;pain;range of motion (ROM);strength  -PAULINA     Comment, Safety Issues/Impairments (Mobility) gait belt/non-skid socks  -PAULINA               User Key  (r) = Recorded By, (t) = Taken By, (c) = Cosigned By      Initials Name Provider Type    Kori Sierra, PT Physical Therapist                   Mobility       Row Name 11/11/23 1302          Bed Mobility    Bed Mobility supine-sit  -PAULINA     Supine-Sit [place] (Bed Mobility) contact guard;1 person assist  -PAULINA     Assistive Device (Bed Mobility) head of bed elevated;bed rails  -PAULINA     Comment, (Bed Mobility) cues for naviagation sequece  -PAULINA       Row Name 11/11/23 1302          Bed-Chair Transfer    Bed-Chair [place] (Transfers) contact guard  -     Assistive Device (Bed-Chair Transfers) walker, front-wheeled  -PAULINA       Row Name 11/11/23 1302          Sit-Stand Transfer    Sit-Stand [place] (Transfers) standby assist;1 person assist  -PAULINA     Assistive Device (Sit-Stand Transfers) walker, front-wheeled  -PAULINA     Comment, (Sit-Stand Transfer) cues for hand placement  -PAULINA       Row Name 11/11/23 1302          Gait/Stairs (Locomotion)    [place] Level (Gait) contact guard;1 person assist  -PAULINA     Assistive Device (Gait) walker, front-wheeled  -PAULINA     Distance in Feet (Gait) 80' to stairs 80' back to room  -PAULINA     Deviations/Abnormal Patterns (Gait) antalgic;base of support, wide;gait speed decreased;stride length decreased  -PAULINA     Bilateral Gait Deviations forward flexed posture;heel strike decreased  -PAULINA     [place] Level  (Stairs) contact guard;1 person assist  -PAULINA     Handrail Location (Stairs) left side (ascending);right side (descending)  -PAULINA     Number of Steps (Stairs) 4  -PAULINA     Ascending Technique (Stairs) step-to-step  -PAULINA     Descending Technique (Stairs) step-to-step  -PAULINA     Comment, (Gait/Stairs) Patient demo safe technique when ambulating with RWx and when navigating stairs with BUE placed on single HR  -       Row Name 11/11/23 1302          Mobility    Extremity Weight-bearing Status right lower extremity  -PAULINA     Right Lower Extremity (Weight-bearing Status) weight-bearing as tolerated (WBAT)  -               User Key  (r) = Recorded By, (t) = Taken By, (c) = Cosigned By      Initials Name Provider Type    Kori Sierra, PT Physical Therapist                   Obj/Interventions       Row Name 11/11/23 1305          Range of Motion Comprehensive    General Range of Motion lower extremity range of motion deficits identified  -     Comment, General Range of Motion Surgical knee ROM  -       Row Name 11/11/23 1305          Strength Comprehensive (MMT)    General Manual Muscle Testing (MMT) Assessment lower extremity strength deficits identified  -     Comment, General Manual Muscle Testing (MMT) Assessment post op knee muscle weakness  -       Row Name 11/11/23 1305          Motor Skills    Therapeutic Exercise hip;knee  -       Row Name 11/11/23 1305          Hip (Therapeutic Exercise)    Hip (Therapeutic Exercise) isometric exercises;strengthening exercise  -     Hip Isometrics (Therapeutic Exercise) flexion;extension;gluteal sets;10 repetitions  -     Hip Strengthening (Therapeutic Exercise) 10 repetitions  -       Row Name 11/11/23 1305          Knee (Therapeutic Exercise)    Knee (Therapeutic Exercise) isometric exercises;strengthening exercise  -     Knee Isometrics (Therapeutic Exercise) quad sets  -     Knee Strengthening (Therapeutic Exercise) SLR (straight leg raise);SAQ  (short arc quad);LAQ (long arc quad);heel slides;10 repetitions  -[facility] Name 11/11/23 1305          Balance    Balance Interventions sitting;standing;sit to stand  -[facility] Name 11/11/23 1305          Sensory Assessment (Somatosensory)    Sensory Assessment (Somatosensory) LE sensation intact  -               User Key  (r) = Recorded By, (t) = Taken By, (c) = Cosigned By      Initials Name Provider Type    Kori Sirera, PT Physical Therapist                   Goals/Plan    No documentation.                  Clinical Impression       [facility] Name 11/11/23 1313          Pain    Pretreatment Pain Rating 2/10  -     Posttreatment Pain Rating 2/10  -PAULINA     Pain Location - Side/Orientation Right  -     Pain Location incisional  -     Pain Location - knee  -     Pain Intervention(s) Ambulation/increased activity;Repositioned;Elevated;Cold applied  -[facility] Name 11/11/23 1313          Plan of Care Review    Plan of Care Reviewed With patient  -     Progress improving  -PAULINA       Row Name 11/11/23 1313          Therapy Assessment/Plan (PT)    Criteria for Skilled Interventions Met (PT) yes;meets criteria;skilled treatment is necessary  -     Therapy Frequency (PT) evaluation only  -PAULINA       Row Name 11/11/23 1313          Vital Signs    O2 Delivery Pre Treatment room air  -     O2 Delivery Intra Treatment room air  -     O2 Delivery Post Treatment room air  -     Pre Patient Position Supine  -     Intra Patient Position Standing  -     Post Patient Position Sitting  -PAULINA       Row Name 11/11/23 1313          Positioning and Restraints    Pre-Treatment Position in bed  -     Post Treatment Position chair  -     In Chair reclined;call light within reach;encouraged to call for assist;exit alarm on;with family/caregiver;legs elevated  -               User Key  (r) = Recorded By, (t) = Taken By, (c) = Cosigned By      Initials Name Provider Type    Kori Sierra,  PT Physical Therapist                   Outcome Measures       Row Name 11/11/23 1316          How much help from another person do you currently need...    Turning from your back to your side while in flat bed without using bedrails? 4  -PAULINA     Moving from lying on back to sitting on the side of a flat bed without bedrails? 3  -PAULINA     Moving to and from a bed to a chair (including a wheelchair)? 3  -PAULINA     Standing up from a chair using your arms (e.g., wheelchair, bedside chair)? 4  -PAULINA     Climbing 3-5 steps with a railing? 3  -PAULINA     To walk in hospital room? 4  -PAULINA     AM-PAC 6 Clicks Score (PT) 21  -PAULINA     Highest level of mobility 6 --> Walked 10 steps or more  -PAULINA       Row Name 11/11/23 1316          Functional Assessment    Outcome Measure Options AM-PAC 6 Clicks Basic Mobility (PT)  -PAULINA               User Key  (r) = Recorded By, (t) = Taken By, (c) = Cosigned By      Initials Name Provider Type    Kori Sierra, PT Physical Therapist                                 Physical Therapy Education       Title: PT OT SLP Therapies (Done)       Topic: Physical Therapy (Done)       Point: Mobility training (Done)       Learning Progress Summary             Patient Acceptance, E,TB, VU,DU by PAULINA at 11/11/2023 1316   Family Acceptance, E,TB, VU,DU by PAULINA at 11/11/2023 1316                         Point: Home exercise program (Done)       Learning Progress Summary             Patient Acceptance, E,TB, VU,DU by PAULINA at 11/11/2023 1316   Family Acceptance, E,TB, VU,DU by PAULINA at 11/11/2023 1316                         Point: Body mechanics (Done)       Learning Progress Summary             Patient Acceptance, E,TB, VU,DU by PAULINA at 11/11/2023 1316   Family Acceptance, E,TB, VU,DU by PAULINA at 11/11/2023 1316                         Point: Precautions (Done)       Learning Progress Summary             Patient Acceptance, E,TB, VU,DU by PAULINA at 11/11/2023 1316   Family Acceptance, E,TB, VU,DU by PAULINA at 11/11/2023 1316                                          User Key       Initials Effective Dates Name Provider Type Discipline    PAULINA 05/19/21 -  Kori Davies, PT Physical Therapist PT                  PT Recommendation and Plan     Plan of Care Reviewed With: patient  Progress: improving     Time Calculation:         PT Charges       Row Name 11/11/23 1320             Time Calculation    Start Time 1024  -      Stop Time 1058  -      Time Calculation (min) 34 min  -PAULINA      PT Received On 11/11/23  -                User Key  (r) = Recorded By, (t) = Taken By, (c) = Cosigned By      Initials Name Provider Type    Kori Sierra, PT Physical Therapist                  Therapy Charges for Today       Code Description Service Date Service Provider Modifiers Qty    81807664749 HC PT EVAL MOD COMPLEXITY 1 11/11/2023 Kori Davies, PT GP 1    61288021694 HC PT THERAPEUTIC ACT EA 15 MIN 11/11/2023 Kori Davies, PT GP 1    22240632365 HC PT THER PROC EA 15 MIN 11/11/2023 Kori Davies, PT GP 1            PT G-Codes  Outcome Measure Options: AM-PAC 6 Clicks Basic Mobility (PT)  AM-PAC 6 Clicks Score (PT): 21  PT Discharge Summary  Anticipated Discharge Disposition (PT): home with home health    Kori Davies, PT  11/11/2023

## 2023-11-11 NOTE — PROGRESS NOTES
"      Patient: Grace Jacob  YOB: 1965     Date of Admission: 11/10/2023 11:32 AM Medical Record Number: [id number]     Attending Physician: Mary Be,Sophy    Procedure(s):  TOTAL KNEE ARTHROPLASTY REVISION Post Operative Day Number: 1    Subjective : No new orthopaedic complaints     Pain Relief: some relief with present medication.     Systemic Complaints: No Complaints  Vitals:    11/10/23 2209 11/11/23 0230 11/11/23 0545 11/11/23 0937   BP: 107/68 111/72 104/67 111/76   BP Location: Left arm Left arm Left arm Left arm   Patient Position: Lying Lying Lying Lying   Pulse: 67 73 78 71   Resp: 18 16 16 16   Temp: 97 °F (36.1 °C) 96.8 °F (36 °C) 97 °F (36.1 °C) 98.1 °F (36.7 °C)   TempSrc: Oral Oral Oral Oral   SpO2: 95% 98% 93% 95%   Weight:       Height:           Physical Exam: 58 y.o. female    General Appearance:       Alert, cooperative, in no acute distress                  Extremities:    Dressing Clean, Dry and Intact         Incision healthy without signs or symptoms of infections         No clinical sign of DVT        Able to do good movements of digits    Pulses:   Pulses palpable and equal bilaterally           Diagnostic Tests:     Results from last 7 days   Lab Units 11/11/23  0515   WBC 10*3/mm3 19.20*   HEMOGLOBIN g/dL 12.3   HEMATOCRIT % 36.6   PLATELETS 10*3/mm3 303     Results from last 7 days   Lab Units 11/11/23  0515   SODIUM mmol/L 136   POTASSIUM mmol/L 5.3*   CHLORIDE mmol/L 102   CO2 mmol/L 25.0   BUN mg/dL 12   CREATININE mg/dL 0.76   GLUCOSE mg/dL 244*   CALCIUM mg/dL 8.8         No results found for: \"CRP\"  No results found for: \"SEDRATE\"  No results found for: \"URICACID\"  No results found for: \"CRYSTAL\"  Microbiology Results (last 10 days)       Procedure Component Value - Date/Time    Tissue / Bone Culture - Tissue, Knee, Right [[id number]] Collected: 11/10/23 1443    Lab Status: Preliminary result Specimen: Tissue from Knee, Right Updated: 11/11/23 " 0915     Tissue Culture No growth     Gram Stain Rare (1+) WBCs seen      No organisms seen          XR Knee 1 or 2 View Right    Result Date: 11/10/2023  Status post total right knee arthroplasty with appropriate alignment. Nonspecific radiopaque foci are seen within the soft tissues of the distal right thigh and overlying the right knee. Clinical correlation is recommended.  This report was finalized on 11/10/2023 9:30 PM by Dr. Bacilio Fierro M.D on Workstation: [id number]             Current Medications:  Scheduled Meds:amLODIPine, 5 mg, Oral, Nightly  aspirin, 81 mg, Oral, Q12H  docusate sodium, 100 mg, Oral, BID  DULoxetine, 60 mg, Oral, Nightly  metoprolol succinate XL, 25 mg, Oral, Nightly  oxybutynin XL, 10 mg, Oral, Daily      Continuous Infusions:sodium chloride, 100 mL/hr, Last Rate: 100 mL/hr (11/10/23 5949)      PRN Meds:.  bisacodyl    HYDROcodone-acetaminophen    HYDROcodone-acetaminophen    HYDROmorphone **AND** naloxone    melatonin    ondansetron **OR** ondansetron    pantoprazole    Assessment:    Procedure(s):  TOTAL KNEE ARTHROPLASTY REVISION      Instability of internal right knee prosthesis, initial encounter    Status post revision of total knee replacement, right      PLAN:   Continues current post-op course  Anticoagulation: Aspirin started  Hemovac Drain to be removed today, overnight drainage has been 200 mL.  Mobilize with PT as tolerated per protocol    Weight Bearing: WBAT  Discharge Plan: OK to plan for discharge in  today to home and home health  from orthopadic perspective.      Mary Be MD    Date: 11/11/2023    Time: 10:43 EST

## 2023-11-11 NOTE — PLAN OF CARE
Goal Outcome Evaluation:POD1 right total knee revision, VSS, afebrile, pain controlled with po pain medication, voiding well, worked well with therapy, Drain with minimal drainage. DC'd drain, M2B delivered,DC instructions reviewed with pt and spouse. All questions answered. Pt taken via WC with all belongings to DC exit.

## 2023-11-11 NOTE — DISCHARGE SUMMARY
Orthopedic Discharge Summary      Patient: Grace Jacob   YOB: 1965    Medical Record Number: [id number]    Attending Physician: Mary Be,*    Consulting Physician(s):   Consulting Physician(s)         Provider   Role Specialty     Susana Call MD  Consulting Physician Internal Medicine            Date of Admission: 11/10/2023 11:32 AM   Date of Discharge:      Admitting Diagnosis: Instability of internal right knee prosthesis, initial encounter [T84.022A]    Procedures Performed  Procedure(s):  TOTAL KNEE ARTHROPLASTY REVISION         Status post revision of total knee replacement, right         Allergies   Allergen Reactions    Penicillins Rash          Discharge Medications        New Medications        Instructions Start Date   aspirin 81 MG EC tablet   81 mg, Oral, Every 12 Hours Scheduled      bisacodyl 5 MG EC tablet  Commonly known as: DULCOLAX   10 mg, Oral, Daily PRN      docusate sodium 100 MG capsule   100 mg, Oral, 2 Times Daily      HYDROcodone-acetaminophen 7.5-325 MG per tablet  Commonly known as: NORCO   1 tablet, Oral, Every 4 Hours PRN      ondansetron 4 MG tablet  Commonly known as: ZOFRAN   4 mg, Oral, Every 6 Hours PRN             Changes to Medications        Instructions Start Date   amLODIPine 5 MG tablet  Commonly known as: NORVASC  What changed: when to take this   5 mg, Oral, Daily, For blood pressue      fluconazole 150 MG tablet  Commonly known as: Diflucan  What changed:   how much to take  how to take this  when to take this  reasons to take this   Take 1 tab po daily for yeast      metoprolol succinate XL 25 MG 24 hr tablet  Commonly known as: Toprol XL  What changed: when to take this   25 mg, Oral, Daily, For blood pressure             Continue These Medications        Instructions Start Date   DULoxetine 60 MG capsule  Commonly known as: CYMBALTA   60 mg, Oral, Nightly, For mood and pain      hydroCHLOROthiazide 25 MG  tablet  Commonly known as: HYDRODIURIL   25 mg, Oral, Daily, As needed for swelling      nystatin 024328 UNIT/GM cream  Commonly known as: MYCOSTATIN   1 application , Topical, 2 Times Daily      nystatin-triamcinolone 941837-9.1 UNIT/GM-% cream  Commonly known as: MYCOLOG II   1 application , Topical, 2 Times Daily      Ozempic (0.25 or 0.5 MG/DOSE) 2 MG/3ML solution pen-injector  Generic drug: Semaglutide(0.25 or 0.5MG/DOS)   0.25 mg, Subcutaneous, Weekly, For 4 weeks then 0.5mg weekly      pantoprazole 40 MG EC tablet  Commonly known as: PROTONIX   40 mg, Oral, Daily, FOR ACID REFLUX      solifenacin 10 MG tablet  Commonly known as: VESICARE   Every 24 Hours      traMADol 50 MG tablet  Commonly known as: ULTRAM   Take 1 tablet by mouth Every 12 (Twelve) Hours As Needed.      triamcinolone 0.1 % cream  Commonly known as: KENALOG   1 application , Topical, 2 Times Daily      valACYclovir 500 MG tablet  Commonly known as: VALTREX   500 mg, Oral, Daily                  Past Medical History:   Diagnosis Date    Anxiety     Arthritis     On celebrex; steroid injections q3 months in back    Chronic pain     BILATERAL KNEE; on cymbalta.    DDD (degenerative disc disease), lumbar     on celebrex    Dysphagia     EGD 2021- s/p esophageal dilatation    Frequent UTI     Gallbladder problem     GERD (gastroesophageal reflux disease)     Heartburn     chronic, episodic. Tums PRN. EGD 2021. No hx of h pylori    Herpes     HTN (hypertension)     previously on lisinopril and Toprol    IBS (irritable bowel syndrome)     Left knee pain     STEPHANIE on CPAP     PONV (postoperative nausea and vomiting)     Pregnancy     S/P laparoscopic cholecystectomy     gallstones    Urinary incontinence     Wound of left leg     SCRAPPED ON BEDFRAME HALF OF A DIME IN SIZE. PT TO INFORM SURGEONS OFFICE        Past Surgical History:   Procedure Laterality Date    HIP ARTHROPLASTY Left 2020    LAPAROSCOPIC CHOLECYSTECTOMY  1994    gallstones    TOTAL HIP  ARTHROPLASTY Right 10/19/2018    Procedure: RIGHT TOTAL  ANTERIOR HIP ARTHROPLASTY;  Surgeon: Mary Be MD;  Location: [facility] OR;  Service: Orthopedics    TOTAL KNEE ARTHROPLASTY      bilateral    TOTAL KNEE ARTHROPLASTY REVISION Left 04/06/2021    Procedure: LEFT KNEE REVISION;  Surgeon: Mary Be MD;  Location: [facility] OR;  Service: Orthopedics;  Laterality: Left;        Social History     Occupational History    Not on file   Tobacco Use    Smoking status: Never    Smokeless tobacco: Never   Vaping Use    Vaping Use: Never used   Substance and Sexual Activity    Alcohol use: No    Drug use: No    Sexual activity: Yes     Partners: Male     Birth control/protection: Post-menopausal      Social History     Social History Narrative    Lives in [place], [place].  w/ 2 children. Works for the [employer].         Family History   Problem Relation Age of Onset    Melanoma Father     Diabetes Father     Coronary artery disease Father     Heart disease Father     Alzheimer's disease Mother     Melanoma Brother     Cancer Brother     Depression Brother     Diabetes Brother     BRCA 1/2 Neg Hx     Breast cancer Neg Hx     Colon cancer Neg Hx     Endometrial cancer Neg Hx     Ovarian cancer Neg Hx     Malig Hyperthermia Neg Hx     Uterine cancer Neg Hx        Physical Exam: 58 y.o. female  General Appearance:    Alert, cooperative, in no acute distress                      Vitals:    11/10/23 2209 11/11/23 0230 11/11/23 0545 11/11/23 0937   BP: 107/68 111/72 104/67 111/76   BP Location: Left arm Left arm Left arm Left arm   Patient Position: Lying Lying Lying Lying   Pulse: 67 73 78 71   Resp: 18 16 16 16   Temp: 97 °F (36.1 °C) 96.8 °F (36 °C) 97 °F (36.1 °C) 98.1 °F (36.7 °C)   TempSrc: Oral Oral Oral Oral   SpO2: 95% 98% 93% 95%   Weight:       Height:            Hospital Course:  58 y.o. female admitted to [facility] to services of Mary Be,* with mechanical  instability right knee  on 11/10/2023 and underwent a revision right  total knee arthroplasty Per Mary Be MD. Antibiotic and VTE prophylaxis were per [facility]IP protocols and included  Kefzol  every 8 hours and Aspirin daily . Post-operatively the patient transferred to the post-operative floor where the patient underwent mobilization therapy that included active as well as passive ROM exercises. Opioids were titrated to achieve appropriate pain management to allow for participation in mobilization exercises. Vital signs are now stable. The incision is intact without signs or symptoms of infection. Operative extremity neurovascular status remains intact.     Appropriate education re: incision care, activity levels, medications, and follow up visits was completed and all questions were answered. The patient is now deemed stable for discharge.      DISCHARGE DISPOSITION AND PLAN:  The  Patient is being discharged home with home health for PT  2-3 X per week for 2-3 weeks and nursing care as needed.       DIAGNOSTIC TESTS:     Admission on 11/10/2023   Component Date Value Ref Range Status    Tissue Culture 11/10/2023 No growth   Preliminary    Gram Stain 11/10/2023 Rare (1+) WBCs seen   Preliminary    Gram Stain 11/10/2023 No organisms seen   Preliminary    Glucose 11/11/2023 244 (H)  65 - 99 mg/dL Final    BUN 11/11/2023 12  6 - 20 mg/dL Final    Creatinine 11/11/2023 0.76  0.57 - 1.00 mg/dL Final    Sodium 11/11/2023 136  136 - 145 mmol/L Final    Potassium 11/11/2023 5.3 (H)  3.5 - 5.2 mmol/L Final    Chloride 11/11/2023 102  98 - 107 mmol/L Final    CO2 11/11/2023 25.0  22.0 - 29.0 mmol/L Final    Calcium 11/11/2023 8.8  8.6 - 10.5 mg/dL Final    BUN/Creatinine Ratio 11/11/2023 15.8  7.0 - 25.0 Final    Anion Gap 11/11/2023 9.0  5.0 - 15.0 mmol/L Final    eGFR 11/11/2023 90.4  >60.0 mL/min/1.73 Final    WBC 11/11/2023 19.20 (H)  3.40 - 10.80 10*3/mm3 Final    RBC 11/11/2023 3.81  3.77 - 5.28 10*6/mm3  "Final    Hemoglobin 11/11/2023 12.3  12.0 - 15.9 g/dL Final    Hematocrit 11/11/2023 36.6  34.0 - 46.6 % Final    MCV 11/11/2023 96.1  79.0 - 97.0 fL Final    MCH 11/11/2023 32.3  26.6 - 33.0 pg Final    MCHC 11/11/2023 33.6  31.5 - 35.7 g/dL Final    RDW 11/11/2023 12.6  12.3 - 15.4 % Final    RDW-SD 11/11/2023 44.3  37.0 - 54.0 fl Final    MPV 11/11/2023 9.6  6.0 - 12.0 fL Final    Platelets 11/11/2023 303  140 - 450 10*3/mm3 Final    Neutrophil % 11/11/2023 92.8 (H)  42.7 - 76.0 % Final    Lymphocyte % 11/11/2023 4.4 (L)  19.6 - 45.3 % Final    Monocyte % 11/11/2023 2.1 (L)  5.0 - 12.0 % Final    Eosinophil % 11/11/2023 0.0 (L)  0.3 - 6.2 % Final    Basophil % 11/11/2023 0.2  0.0 - 1.5 % Final    Immature Grans % 11/11/2023 0.5  0.0 - 0.5 % Final    Neutrophils, Absolute 11/11/2023 17.81 (H)  1.70 - 7.00 10*3/mm3 Final    Lymphocytes, Absolute 11/11/2023 0.84  0.70 - 3.10 10*3/mm3 Final    Monocytes, Absolute 11/11/2023 0.41  0.10 - 0.90 10*3/mm3 Final    Eosinophils, Absolute 11/11/2023 0.00  0.00 - 0.40 10*3/mm3 Final    Basophils, Absolute 11/11/2023 0.04  0.00 - 0.20 10*3/mm3 Final    Immature Grans, Absolute 11/11/2023 0.10 (H)  0.00 - 0.05 10*3/mm3 Final    nRBC 11/11/2023 0.0  0.0 - 0.2 /100 WBC Final       No results found for: \"URICACID\"  No results found for: \"CRYSTAL\"  Microbiology Results (last 10 days)       Procedure Component Value - Date/Time    Tissue / Bone Culture - Tissue, Knee, Right [[id number]] Collected: 11/10/23 1443    Lab Status: Preliminary result Specimen: Tissue from Knee, Right Updated: 11/11/23 0915     Tissue Culture No growth     Gram Stain Rare (1+) WBCs seen      No organisms seen          XR Knee 1 or 2 View Right    Result Date: 11/10/2023  Status post total right knee arthroplasty with appropriate alignment. Nonspecific radiopaque foci are seen within the soft tissues of the distal right thigh and overlying the right knee. Clinical correlation is recommended.  This report was " finalized on 11/10/2023 9:30 PM by Dr. Bacilio Fierro M.D on Workstation: [email or web address]       Discharge and Follow up Instructions:     Total Knee Joint Replacement Discharge Instructions:    I. ACTIVITIES:  1. Exercises:  Complete exercise program as taught by the hospital physical therapist 2 times per day  Exercise program will be advanced by your home health physical therapist  During the day be up ambulating every 2 hours (while awake) for short distances  Complete the ankle pump exercises at least 10 times per hour (while awake)  Elevate legs most of the day the first week post operatively and thereafter elevate legs when in bed and for at least 30 minutes during the day.   Caution must be taken to avoid pillow placement under the bend of the knee as this can led to flexion contractures of the knee. Pillow placement under the heel is encouraged.  Use cold packs 20-30 minutes approximately 5 times per day. This should be done before and after completing your exercises and at any time you are experiencing pain/ stiffness in your operative extremity.      2. Activities of Daily Living:  No tub baths, hot tubs, or swimming pools for 4 weeks  May shower and let water run over the incision on post-operative day #5 if no drainage. Do not scrub or rub the incision. Simply let the water run over the incision and pat dry.    II. Restrictions  Do not cross legs or kneel  Your surgeon will discuss with you when you will be able to drive again. Usual guidelines are you are to be off pain medications prior to driving.  Weight bearing is as tolerated  First week stay inside on even terrain. May go up and down stairs one stair at a time utilizing the hand rail.  After one week, you may venture outside.    III. Precautions:  Everyone that comes near you should wash their hands  No elective dental, genital-urinary, or colon procedures or surgical procedures for 12 weeks after surgery unless absolutely necessary.   If dental  work or surgical procedure is deemed absolutely necessary, you will need to contact your surgeon as you will need to take antibiotics 1 hour prior to any dental work (including teeth cleanings).  Please discuss with your surgeon prophylactic antibiotics as the length of time this intervention will be necessary for you varies with each patient’s health history and situation.  Avoid sick people. If you must be around someone who is ill, they should wear a mask.  Avoid visits to the Emergency Room or Urgent Care. If you feel you need to go to the emergency room, please notify your surgeon.    Stockings are to be worn for one week after surgery and are to be placed on in the morning and removed at night. Observe your skin when stocking is removed for any problems. Monitor the stockings to ensure that any swelling is not causing the stockings to become too tight. In this case, remove stockings immediately.    IV. INCISION CARE:  Wash your hands prior to dressing changes  Change the dressing as needed to keep incision clean and dry. Utilize dry gauze and paper tape. Avoid touching the side of the gauze that goes against the incision with your hands.  No creams or ointments to the incision  May remove dressing once the incision is free of drainage  Do not touch or pick at the incision  Check incision every day and notify surgeon immediately if any of the following signs or symptoms are noted:  Increase in redness  Increase in swelling around the incision and of the entire extremity  Increase in pain  Drainage oozing from the incision  Pulling apart of the edges of the incision  Increase in overall body temperature (greater than 100.5 degrees)     You have absorbable sutures with steristrips, please do not remove the steri strips for 14 days, you can shower on them 6 days after surgery.    V. Medications:   1. Anticoagulants: You will be discharged on an anticoagulant. This is a prophylactic medication that helps prevent  blood clots during your post-operative period.  You will be on Aspirin  81 mg twice daily for 30 days. If you were on Aspirin 81 mg prior to surgery you can go back to home dose once the 30 days are completed.     While taking the anticoagulant, you should avoid taking any additional aspirin, ibuprofen (Advil or Motrin), Aleve (Naprosyn) or other non-steroidal anti-inflammatory medications.   Notify surgeon immediately if any jesus bleeding is noted in the urine, stool, emesis, or from the nose or the incision. Blood in the stool will often appear as black rather than red. Blood in urine may appear as pink. Blood in emesis may appear as brown/black like coffee grounds.  You will need to apply pressure for longer periods of time to any cuts or abrasions to stop bleeding  Avoid alcohol while taking anticoagulants    2. Stool Softeners: You will be at greater risk of constipation after surgery due to being less mobile and the pain medications.   Take stool softeners as instructed by your surgeon while on pain medications. Over the counter Colace 100 mg 1-2 capsules twice daily.   If stools become too loose or too frequent, please decreases the dosage or stop the stool softener.  If constipation occurs despite use of stool softeners, you are to continue the stool softeners and add a laxative (Milk of Magnesia 1 ounce daily as needed).  Dulcolax oral tabs or suppository, or a fleets enema can also be utilized for constipation and can be obtained over the counter.   If above interventions are unsuccessful in inducing bowel movements, please contact your surgeon's office / family physician's office.  Drink plenty of fluids, and eat fruits and vegetables during your recovery time    3. Pain Medications utilized after surgery are narcotics and the law requires that the following information be given to all patients that are prescribed narcotics:  CLASSIFICATION: Pain medications are called Opioids and are  narcotics  LEGALITIES: It is illegal to share narcotics with others and to drive within 24 hours of taking narcotics  POTENTIAL SIDE EFFECTS: Potential side effects of opioids include: nausea, vomiting, itching, dizziness, drowsiness, dry mouth, constipation, and difficulty urinating.  POTENTIAL ADVERSE EFFECTS:   Opioid tolerance can develop with use of pain medications and this simply means that it requires more and more of the medication to control pain; however, this is seen more in patients that use opioids for longer periods of time.  Opioid dependence can develop with use of Opioids and this simply means that to stop the medication can cause withdrawal symptoms; however, this is seen with patients that use Opioids for longer periods of time.  Opioid addiction can develop with use of Opioids and the incidence of this is very unlikely in patients who take the medications as ordered and stop the medications as instructed.  Opioid overdose can be dangerous, but is unlikely when the medication is taken as ordered and stopped when ordered. It is important not to mix opioids with alcohol or with and type of sedative such as Benadryl as this can lead to over sedation and respiratory difficulty.  DOSAGE:   Pain medications will need to be taken consistently for the first week to decrease pain and promote adequate pain relief and participation in physical therapy.  After the initial surgical pain begins to resolve, you may begin to decrease the pain medication. By the end of 6 weeks, you should be off of pain medications.  Refills will not be given by the office during evening hours, on weekends, or after 6 weeks post-op.  To seek refills on pain medications during the initial 6 week post-operative period, you must call the office 48 hours in advance to request the refill. The office will then notify you when to  the prescription. DO NOT wait until you are out of the medication to request a refill.    V.  FOLLOW-UP VISITS:  You will need to follow up in the office with your surgeon on November 28 ,2023.  Please call this number [phone] to schedule this appointment.  If you have any concerns or suspected complications prior to your follow up visit, please call your surgeons office. Do not wait until your appointment time if you suspect complications. These will need to be addressed in the office promptly.      Date:     Mary Be MD    CC: Tiffanie Dickson PA-C; MD Indiana Cohen, Mary GIRON,*

## 2023-11-11 NOTE — PLAN OF CARE
A/Ox4. O2 at 2L/min. NVS intact. Drowsy this evening. POD#1 Revision right total knee arthroplasty. W/ Hemovac, drained 230ml this shift. Cefazolin completed. PRN Norco for pain. On ASA q12H. IVF NS at 100ml/hr. WBAT. Assist x2 to bedside commode. On PW at HS, d/c PW this morning. SCD's on BLE.

## 2023-11-11 NOTE — PLAN OF CARE
Goal Outcome Evaluation:  Plan of Care Reviewed With: patient        Progress: improving     Pt is a 58 y.o. female s/p R TKA on 11/10/2023 and is WBAT. Pt presents today with R knee ROM/strength deficits and localized R knee edema. Pt requires CGA for bed mobility, CGA/SBA for STS transfers, and CGA for amb with FWW for 80 ft to stairs and 80' back to room. Patient demo ability to navigate 4 steps with single HR with step to step pattern and with safe technique.  Anticipate pt will D/C to  later today.      Anticipated Discharge Disposition (PT): home with home health

## 2023-11-12 ENCOUNTER — READMISSION MANAGEMENT (OUTPATIENT)
Dept: CALL CENTER | Facility: HOSPITAL | Age: 58
End: 2023-11-12
Payer: COMMERCIAL

## 2023-11-12 NOTE — PAYOR COMM NOTE
"Grace Leija (58 y.o. 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    Marital Status                               Admission Date   11/10/23    Admission Type   Elective    Admitting Provider   Mary Be MD    Attending Provider       Department, Room/Bed   [address], P879/1       Discharge Date   11/11/2023    Discharge Disposition   Home-Health Care Sv    Discharge Destination                                 Attending Provider: (none)   Allergies: Penicillins    Isolation: None   Infection: None   Code Status: Prior    Ht: 170.2 cm (67\")   Wt: 117 kg (257 lb 15 oz)    Admission Cmt: None   Principal Problem: Instability of internal right knee prosthesis [T84.022A]                   Active Insurance as of 11/10/2023       Primary Coverage       Payor Plan Insurance Group Employer/Plan Group    [facility] [employer] [id number]       Payor Plan Address Payor Plan Phone Number Payor Plan Fax Number Effective Dates    [address] [phone]  1/1/2015 - None Entered    [place] [address]         Subscriber Name Subscriber Birth Date Member ID       GRACE LEIJA 1965 [id number]                     Emergency Contacts        (Rel.) Home Phone Work Phone Mobile Phone    Ant Leija (Spouse) [phone] -- [phone]                 Discharge Summary        Mary Be MD at 11/11/23 1047          Orthopedic Discharge Summary      Patient: Grace Leija   YOB: 1965    Medical Record Number: [id number]    Attending Physician: Mary Be,*    Consulting Physician(s):   Consulting Physician(s)         Provider   Role " Specialty     Susana Call MD  Consulting Physician Internal Medicine            Date of Admission: 11/10/2023 11:32 AM   Date of Discharge:      Admitting Diagnosis: Instability of internal right knee prosthesis, initial encounter [T84.022A]    Procedures Performed  Procedure(s):  TOTAL KNEE ARTHROPLASTY REVISION         Status post revision of total knee replacement, right         Allergies   Allergen Reactions    Penicillins Rash          Discharge Medications        New Medications        Instructions Start Date   aspirin 81 MG EC tablet   81 mg, Oral, Every 12 Hours Scheduled      bisacodyl 5 MG EC tablet  Commonly known as: DULCOLAX   10 mg, Oral, Daily PRN      docusate sodium 100 MG capsule   100 mg, Oral, 2 Times Daily      HYDROcodone-acetaminophen 7.5-325 MG per tablet  Commonly known as: NORCO   1 tablet, Oral, Every 4 Hours PRN      ondansetron 4 MG tablet  Commonly known as: ZOFRAN   4 mg, Oral, Every 6 Hours PRN             Changes to Medications        Instructions Start Date   amLODIPine 5 MG tablet  Commonly known as: NORVASC  What changed: when to take this   5 mg, Oral, Daily, For blood pressue      fluconazole 150 MG tablet  Commonly known as: Diflucan  What changed:   how much to take  how to take this  when to take this  reasons to take this   Take 1 tab po daily for yeast      metoprolol succinate XL 25 MG 24 hr tablet  Commonly known as: Toprol XL  What changed: when to take this   25 mg, Oral, Daily, For blood pressure             Continue These Medications        Instructions Start Date   DULoxetine 60 MG capsule  Commonly known as: CYMBALTA   60 mg, Oral, Nightly, For mood and pain      hydroCHLOROthiazide 25 MG tablet  Commonly known as: HYDRODIURIL   25 mg, Oral, Daily, As needed for swelling      nystatin 694188 UNIT/GM cream  Commonly known as: MYCOSTATIN   1 application , Topical, 2 Times Daily      nystatin-triamcinolone 808291-1.1 UNIT/GM-% cream  Commonly known as:  MYCOLOG II   1 application , Topical, 2 Times Daily      Ozempic (0.25 or 0.5 MG/DOSE) 2 MG/3ML solution pen-injector  Generic drug: Semaglutide(0.25 or 0.5MG/DOS)   0.25 mg, Subcutaneous, Weekly, For 4 weeks then 0.5mg weekly      pantoprazole 40 MG EC tablet  Commonly known as: PROTONIX   40 mg, Oral, Daily, FOR ACID REFLUX      solifenacin 10 MG tablet  Commonly known as: VESICARE   Every 24 Hours      traMADol 50 MG tablet  Commonly known as: ULTRAM   Take 1 tablet by mouth Every 12 (Twelve) Hours As Needed.      triamcinolone 0.1 % cream  Commonly known as: KENALOG   1 application , Topical, 2 Times Daily      valACYclovir 500 MG tablet  Commonly known as: VALTREX   500 mg, Oral, Daily                  Past Medical History:   Diagnosis Date    Anxiety     Arthritis     On celebrex; steroid injections q3 months in back    Chronic pain     BILATERAL KNEE; on cymbalta.    DDD (degenerative disc disease), lumbar     on celebrex    Dysphagia     EGD 2021- s/p esophageal dilatation    Frequent UTI     Gallbladder problem     GERD (gastroesophageal reflux disease)     Heartburn     chronic, episodic. Tums PRN. EGD 2021. No hx of h pylori    Herpes     HTN (hypertension)     previously on lisinopril and Toprol    IBS (irritable bowel syndrome)     Left knee pain     STEPHANIE on CPAP     PONV (postoperative nausea and vomiting)     Pregnancy     S/P laparoscopic cholecystectomy     gallstones    Urinary incontinence     Wound of left leg     SCRAPPED ON BEDFRAME HALF OF A DIME IN SIZE. PT TO INFORM SURGEONS OFFICE        Past Surgical History:   Procedure Laterality Date    HIP ARTHROPLASTY Left 2020    LAPAROSCOPIC CHOLECYSTECTOMY  1994    gallstones    TOTAL HIP ARTHROPLASTY Right 10/19/2018    Procedure: RIGHT TOTAL  ANTERIOR HIP ARTHROPLASTY;  Surgeon: Mary Be MD;  Location: [facility] OR;  Service: Orthopedics    TOTAL KNEE ARTHROPLASTY      bilateral    TOTAL KNEE ARTHROPLASTY REVISION Left 04/06/2021     Procedure: LEFT KNEE REVISION;  Surgeon: Mary Be MD;  Location: [facility];  Service: Orthopedics;  Laterality: Left;        Social History     Occupational History    Not on file   Tobacco Use    Smoking status: Never    Smokeless tobacco: Never   Vaping Use    Vaping Use: Never used   Substance and Sexual Activity    Alcohol use: No    Drug use: No    Sexual activity: Yes     Partners: Male     Birth control/protection: Post-menopausal      Social History     Social History Narrative    Lives in [place], [place].  w/ 2 children. 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         Family History   Problem Relation Age of Onset    Melanoma Father     Diabetes Father     Coronary artery disease Father     Heart disease Father     Alzheimer's disease Mother     Melanoma Brother     Cancer Brother     Depression Brother     Diabetes Brother     BRCA 1/2 Neg Hx     Breast cancer Neg Hx     Colon cancer Neg Hx     Endometrial cancer Neg Hx     Ovarian cancer Neg Hx     Malig Hyperthermia Neg Hx     Uterine cancer Neg Hx        Physical Exam: 58 y.o. female  General Appearance:    Alert, cooperative, in no acute distress                      Vitals:    11/10/23 2209 11/11/23 0230 11/11/23 0545 11/11/23 0937   BP: 107/68 111/72 104/67 111/76   BP Location: Left arm Left arm Left arm Left arm   Patient Position: Lying Lying Lying Lying   Pulse: 67 73 78 71   Resp: 18 16 16 16   Temp: 97 °F (36.1 °C) 96.8 °F (36 °C) 97 °F (36.1 °C) 98.1 °F (36.7 °C)   TempSrc: Oral Oral Oral Oral   SpO2: 95% 98% 93% 95%   Weight:       Height:            Hospital Course:  58 y.o. female admitted to [facility] to services of Mary Be,Sophy with mechanical instability right knee  on 11/10/2023 and underwent a revision right  total knee arthroplasty Per Mary Be MD. Antibiotic and VTE prophylaxis were per SCIP protocols and included  Kefzol  every 8 hours and Aspirin daily . Post-operatively the patient  transferred to the post-operative floor where the patient underwent mobilization therapy that included active as well as passive ROM exercises. Opioids were titrated to achieve appropriate pain management to allow for participation in mobilization exercises. Vital signs are now stable. The incision is intact without signs or symptoms of infection. Operative extremity neurovascular status remains intact.     Appropriate education re: incision care, activity levels, medications, and follow up visits was completed and all questions were answered. The patient is now deemed stable for discharge.      DISCHARGE DISPOSITION AND PLAN:  The  Patient is being discharged home with home health for PT  2-3 X per week for 2-3 weeks and nursing care as needed.       DIAGNOSTIC TESTS:     Admission on 11/10/2023   Component Date Value Ref Range Status    Tissue Culture 11/10/2023 No growth   Preliminary    Gram Stain 11/10/2023 Rare (1+) WBCs seen   Preliminary    Gram Stain 11/10/2023 No organisms seen   Preliminary    Glucose 11/11/2023 244 (H)  65 - 99 mg/dL Final    BUN 11/11/2023 12  6 - 20 mg/dL Final    Creatinine 11/11/2023 0.76  0.57 - 1.00 mg/dL Final    Sodium 11/11/2023 136  136 - 145 mmol/L Final    Potassium 11/11/2023 5.3 (H)  3.5 - 5.2 mmol/L Final    Chloride 11/11/2023 102  98 - 107 mmol/L Final    CO2 11/11/2023 25.0  22.0 - 29.0 mmol/L Final    Calcium 11/11/2023 8.8  8.6 - 10.5 mg/dL Final    BUN/Creatinine Ratio 11/11/2023 15.8  7.0 - 25.0 Final    Anion Gap 11/11/2023 9.0  5.0 - 15.0 mmol/L Final    eGFR 11/11/2023 90.4  >60.0 mL/min/1.73 Final    WBC 11/11/2023 19.20 (H)  3.40 - 10.80 10*3/mm3 Final    RBC 11/11/2023 3.81  3.77 - 5.28 10*6/mm3 Final    Hemoglobin 11/11/2023 12.3  12.0 - 15.9 g/dL Final    Hematocrit 11/11/2023 36.6  34.0 - 46.6 % Final    MCV 11/11/2023 96.1  79.0 - 97.0 fL Final    MCH 11/11/2023 32.3  26.6 - 33.0 pg Final    MCHC 11/11/2023 33.6  31.5 - 35.7 g/dL Final    RDW 11/11/2023  "12.6  12.3 - 15.4 % Final    RDW-SD 11/11/2023 44.3  37.0 - 54.0 fl Final    MPV 11/11/2023 9.6  6.0 - 12.0 fL Final    Platelets 11/11/2023 303  140 - 450 10*3/mm3 Final    Neutrophil % 11/11/2023 92.8 (H)  42.7 - 76.0 % Final    Lymphocyte % 11/11/2023 4.4 (L)  19.6 - 45.3 % Final    Monocyte % 11/11/2023 2.1 (L)  5.0 - 12.0 % Final    Eosinophil % 11/11/2023 0.0 (L)  0.3 - 6.2 % Final    Basophil % 11/11/2023 0.2  0.0 - 1.5 % Final    Immature Grans % 11/11/2023 0.5  0.0 - 0.5 % Final    Neutrophils, Absolute 11/11/2023 17.81 (H)  1.70 - 7.00 10*3/mm3 Final    Lymphocytes, Absolute 11/11/2023 0.84  0.70 - 3.10 10*3/mm3 Final    Monocytes, Absolute 11/11/2023 0.41  0.10 - 0.90 10*3/mm3 Final    Eosinophils, Absolute 11/11/2023 0.00  0.00 - 0.40 10*3/mm3 Final    Basophils, Absolute 11/11/2023 0.04  0.00 - 0.20 10*3/mm3 Final    Immature Grans, Absolute 11/11/2023 0.10 (H)  0.00 - 0.05 10*3/mm3 Final    nRBC 11/11/2023 0.0  0.0 - 0.2 /100 WBC Final       No results found for: \"URICACID\"  No results found for: \"CRYSTAL\"  Microbiology Results (last 10 days)       Procedure Component Value - Date/Time    Tissue / Bone Culture - Tissue, Knee, Right [[id number]] Collected: 11/10/23 1443    Lab Status: Preliminary result Specimen: Tissue from Knee, Right Updated: 11/11/23 0915     Tissue Culture No growth     Gram Stain Rare (1+) WBCs seen      No organisms seen          XR Knee 1 or 2 View Right    Result Date: 11/10/2023  Status post total right knee arthroplasty with appropriate alignment. Nonspecific radiopaque foci are seen within the soft tissues of the distal right thigh and overlying the right knee. Clinical correlation is recommended.  This report was finalized on 11/10/2023 9:30 PM by Dr. Bacilio Fierro M.D on Workstation: [id number]       Discharge and Follow up Instructions:     Total Knee Joint Replacement Discharge Instructions:    I. ACTIVITIES:  1. Exercises:  Complete exercise program as taught by the " hospital physical therapist 2 times per day  Exercise program will be advanced by your home health physical therapist  During the day be up ambulating every 2 hours (while awake) for short distances  Complete the ankle pump exercises at least 10 times per hour (while awake)  Elevate legs most of the day the first week post operatively and thereafter elevate legs when in bed and for at least 30 minutes during the day.   Caution must be taken to avoid pillow placement under the bend of the knee as this can led to flexion contractures of the knee. Pillow placement under the heel is encouraged.  Use cold packs 20-30 minutes approximately 5 times per day. This should be done before and after completing your exercises and at any time you are experiencing pain/ stiffness in your operative extremity.      2. Activities of Daily Living:  No tub baths, hot tubs, or swimming pools for 4 weeks  May shower and let water run over the incision on post-operative day #5 if no drainage. Do not scrub or rub the incision. Simply let the water run over the incision and pat dry.    II. Restrictions  Do not cross legs or kneel  Your surgeon will discuss with you when you will be able to drive again. Usual guidelines are you are to be off pain medications prior to driving.  Weight bearing is as tolerated  First week stay inside on even terrain. May go up and down stairs one stair at a time utilizing the hand rail.  After one week, you may venture outside.    III. Precautions:  Everyone that comes near you should wash their hands  No elective dental, genital-urinary, or colon procedures or surgical procedures for 12 weeks after surgery unless absolutely necessary.   If dental work or surgical procedure is deemed absolutely necessary, you will need to contact your surgeon as you will need to take antibiotics 1 hour prior to any dental work (including teeth cleanings).  Please discuss with your surgeon prophylactic antibiotics as the length  of time this intervention will be necessary for you varies with each patient’s health history and situation.  Avoid sick people. If you must be around someone who is ill, they should wear a mask.  Avoid visits to the Emergency Room or Urgent Care. If you feel you need to go to the emergency room, please notify your surgeon.    Stockings are to be worn for one week after surgery and are to be placed on in the morning and removed at night. Observe your skin when stocking is removed for any problems. Monitor the stockings to ensure that any swelling is not causing the stockings to become too tight. In this case, remove stockings immediately.    IV. INCISION CARE:  Wash your hands prior to dressing changes  Change the dressing as needed to keep incision clean and dry. Utilize dry gauze and paper tape. Avoid touching the side of the gauze that goes against the incision with your hands.  No creams or ointments to the incision  May remove dressing once the incision is free of drainage  Do not touch or pick at the incision  Check incision every day and notify surgeon immediately if any of the following signs or symptoms are noted:  Increase in redness  Increase in swelling around the incision and of the entire extremity  Increase in pain  Drainage oozing from the incision  Pulling apart of the edges of the incision  Increase in overall body temperature (greater than 100.5 degrees)     You have absorbable sutures with steristrips, please do not remove the steri strips for 14 days, you can shower on them 6 days after surgery.    V. Medications:   1. Anticoagulants: You will be discharged on an anticoagulant. This is a prophylactic medication that helps prevent blood clots during your post-operative period.  You will be on Aspirin  81 mg twice daily for 30 days. If you were on Aspirin 81 mg prior to surgery you can go back to home dose once the 30 days are completed.     While taking the anticoagulant, you should avoid taking  any additional aspirin, ibuprofen (Advil or Motrin), Aleve (Naprosyn) or other non-steroidal anti-inflammatory medications.   Notify surgeon immediately if any jesus bleeding is noted in the urine, stool, emesis, or from the nose or the incision. Blood in the stool will often appear as black rather than red. Blood in urine may appear as pink. Blood in emesis may appear as brown/black like coffee grounds.  You will need to apply pressure for longer periods of time to any cuts or abrasions to stop bleeding  Avoid alcohol while taking anticoagulants    2. Stool Softeners: You will be at greater risk of constipation after surgery due to being less mobile and the pain medications.   Take stool softeners as instructed by your surgeon while on pain medications. Over the counter Colace 100 mg 1-2 capsules twice daily.   If stools become too loose or too frequent, please decreases the dosage or stop the stool softener.  If constipation occurs despite use of stool softeners, you are to continue the stool softeners and add a laxative (Milk of Magnesia 1 ounce daily as needed).  Dulcolax oral tabs or suppository, or a fleets enema can also be utilized for constipation and can be obtained over the counter.   If above interventions are unsuccessful in inducing bowel movements, please contact your surgeon's office / family physician's office.  Drink plenty of fluids, and eat fruits and vegetables during your recovery time    3. Pain Medications utilized after surgery are narcotics and the law requires that the following information be given to all patients that are prescribed narcotics:  CLASSIFICATION: Pain medications are called Opioids and are narcotics  LEGALITIES: It is illegal to share narcotics with others and to drive within 24 hours of taking narcotics  POTENTIAL SIDE EFFECTS: Potential side effects of opioids include: nausea, vomiting, itching, dizziness, drowsiness, dry mouth, constipation, and difficulty  urinating.  POTENTIAL ADVERSE EFFECTS:   Opioid tolerance can develop with use of pain medications and this simply means that it requires more and more of the medication to control pain; however, this is seen more in patients that use opioids for longer periods of time.  Opioid dependence can develop with use of Opioids and this simply means that to stop the medication can cause withdrawal symptoms; however, this is seen with patients that use Opioids for longer periods of time.  Opioid addiction can develop with use of Opioids and the incidence of this is very unlikely in patients who take the medications as ordered and stop the medications as instructed.  Opioid overdose can be dangerous, but is unlikely when the medication is taken as ordered and stopped when ordered. It is important not to mix opioids with alcohol or with and type of sedative such as Benadryl as this can lead to over sedation and respiratory difficulty.  DOSAGE:   Pain medications will need to be taken consistently for the first week to decrease pain and promote adequate pain relief and participation in physical therapy.  After the initial surgical pain begins to resolve, you may begin to decrease the pain medication. By the end of 6 weeks, you should be off of pain medications.  Refills will not be given by the office during evening hours, on weekends, or after 6 weeks post-op.  To seek refills on pain medications during the initial 6 week post-operative period, you must call the office 48 hours in advance to request the refill. The office will then notify you when to  the prescription. DO NOT wait until you are out of the medication to request a refill.    V. FOLLOW-UP VISITS:  You will need to follow up in the office with your surgeon on November 28 ,2023.  Please call this number [phone] to schedule this appointment.  If you have any concerns or suspected complications prior to your follow up visit, please call your surgeons  office. Do not wait until your appointment time if you suspect complications. These will need to be addressed in the office promptly.      Date:     Mary Be MD    CC: Tiffanie Dickson PA-C; MD Indiana Cohen Madhusudhan R,*             Electronically signed by Mary Be MD at 11/11/23 1048       Discharge Order (From admission, onward)       Start     Ordered    11/11/23 1045  Discharge patient  Once        Expected Discharge Date: 11/11/23   Discharge Disposition: Home-Health Care [facility]   Physician of Record for Attribution - Please select from Treatment Team: MARY BE [[id number]]   Review needed by CMO to determine Physician of Record: No      Question Answer Comment   Physician of Record for Attribution - Please select from Treatment Team MARY BE    Review needed by CMO to determine Physician of Record No        11/11/23 1044

## 2023-11-12 NOTE — OUTREACH NOTE
Prep Survey      Flowsheet Row Responses   [facility] [place] patient discharged from? [place]   Is LACE score < 7 ? Yes   Eligibility [facility]   Date of Admission 11/10/23   Date of Discharge 11/11/23   Discharge Disposition Home-Health Care [facility] [place]   Discharge diagnosis TOTAL KNEE ARTHROPLASTY REVISION   Does the patient have one of the following disease processes/diagnoses(primary or secondary)? Total Joint Replacement   Does the patient have Home health ordered? Yes   What is the Home health agency?  [facility]    Is there a DME ordered? No   Prep survey completed? Yes            Sally LUIS - Registered Nurse

## 2023-11-13 ENCOUNTER — TRANSITIONAL CARE MANAGEMENT TELEPHONE ENCOUNTER (OUTPATIENT)
Dept: CALL CENTER | Facility: HOSPITAL | Age: 58
End: 2023-11-13
Payer: COMMERCIAL

## 2023-11-13 LAB
BACTERIA SPEC AEROBE CULT: NORMAL
GRAM STN SPEC: NORMAL
GRAM STN SPEC: NORMAL

## 2023-11-13 NOTE — OUTREACH NOTE
Call Center TCM Note      Flowsheet Row Responses   [facility] patient discharged from? [place]   Does the patient have one of the following disease processes/diagnoses(primary or secondary)? Total Joint Replacement   Joint surgery performed? Knee   TCM attempt successful? Yes   Call start time 0859   Call end time 0908   Has the patient been back in either the hospital or Emergency Department since discharge? No   Discharge diagnosis TOTAL KNEE ARTHROPLASTY REVISION   Is patient permission given to speak with other caregiver? Yes   Person spoke with today (if not patient) and relationship Spouse and patient   Does the patient have all medications related to this admission filled (includes all antibiotics, pain medications, etc.) Yes   Is the patient taking all medications as directed (includes completed medication regime)? Yes   Is the patient able to teach back alternate methods of pain control? Ice, Knee-elevation/no pillow under knee   Comments PCP Tiffanie ROLDAN. Patient planning to follow up with ortho surgeon. Would not be easy to get out for PCP appt at this time.   Does the patient have an appointment with their PCP within 7-14 days of discharge? No   Nursing Interventions Patient declined scheduling/rescheduling appointment at this time, Routed TCM call to PCP office, Patient desires to follow up with specialty only   What is the Home health agency?  [facility]   Has home health visited the patient within 72 hours of discharge? Call prior to 72 hours  [Patient reports planned to come today]   Psychosocial issues? No   When is the first therapy visit scheduled (PO Day) including how many days per week  today, POD #2   Has the patient began therapy sessions (either in the home or as an out patient)? No   Does the patient have a wound vac in place? N/A   Has the patient fallen since discharge? No   Did the patient receive a copy of their discharge instructions? Yes   Nursing interventions Reviewed  instructions with patient   What is the patient's perception of their functional status since discharge? Improving   Is the patient able to teach back home safety measures? Accessibility to necessary areas in home   If the patient is a current smoker, are they able to teach back resources for cessation? Not a smoker   Is the patient/caregiver able to teach back the hierarchy of who to call/visit for symptoms/problems? PCP, Specialist, Home health nurse, Urgent Care, ED, 911 Yes   TCM call completed? Yes   Wrap up additional comments Patient reports that her blood sugar ran high in hospital. Would like PCP to review hospital labs.   Call end time 0908   Would this patient benefit from a Referral to [facility] Social Work? No   Is the patient interested in additional calls from an ambulatory ? No            Nohemi Benitez RN    11/13/2023, 09:15 EST

## 2023-11-13 NOTE — CASE MANAGEMENT/SOCIAL WORK
Case Management Discharge Note      Final Note: pt dc'd to home with [facility] to follow         Selected Continued Care - Discharged on 11/11/2023 Admission date: 11/10/2023 - Discharge disposition: Home-Health Care Svc      Destination    No services have been selected for the patient.                Durable Medical Equipment    No services have been selected for the patient.                Dialysis/Infusion    No services have been selected for the patient.                Home Medical Care Coordination complete.      Service Provider Selected Services Address Phone Fax Patient Preferred    [facility] [address] [place] [address] [phone] --              Therapy    No services have been selected for the patient.                Community Resources    No services have been selected for the patient.                Community & DME    No services have been selected for the patient.                    Transportation Services  Private: Car    Final Discharge Disposition Code: 01 - home or self-care

## 2023-11-15 LAB — BACTERIA SPEC ANAEROBE CULT: NORMAL

## 2023-11-17 ENCOUNTER — TELEPHONE (OUTPATIENT)
Dept: ORTHOPEDIC SURGERY | Facility: HOSPITAL | Age: 58
End: 2023-11-17
Payer: COMMERCIAL

## 2023-11-17 NOTE — TELEPHONE ENCOUNTER
Attempted to reach Ms. Jacob to see how she is doing as she is 1 week SP TKA Revision. Unable to leave  at this time as MB has not been set up.

## 2023-12-06 ENCOUNTER — HOSPITAL ENCOUNTER (OUTPATIENT)
Dept: MAMMOGRAPHY | Facility: HOSPITAL | Age: 58
Discharge: HOME | End: 2023-12-06
Admitting: OBSTETRICS & GYNECOLOGY
Payer: COMMERCIAL

## 2023-12-06 PROCEDURE — 77067 SCR MAMMO BI INCL CAD: CPT

## 2023-12-06 PROCEDURE — 77063 BREAST TOMOSYNTHESIS BI: CPT

## 2024-01-08 ENCOUNTER — OFFICE VISIT (OUTPATIENT)
Dept: FAMILY MEDICINE CLINIC | Facility: CLINIC | Age: 59
End: 2024-01-08
Payer: COMMERCIAL

## 2024-01-08 VITALS
HEART RATE: 67 BPM | OXYGEN SATURATION: 99 % | SYSTOLIC BLOOD PRESSURE: 130 MMHG | WEIGHT: 249.6 LBS | BODY MASS INDEX: 39.18 KG/M2 | HEIGHT: 67 IN | DIASTOLIC BLOOD PRESSURE: 88 MMHG | TEMPERATURE: 96.9 F

## 2024-01-08 DIAGNOSIS — J98.01 BRONCHOSPASM, ACUTE: ICD-10-CM

## 2024-01-08 DIAGNOSIS — J00 ACUTE NASOPHARYNGITIS: Primary | ICD-10-CM

## 2024-01-08 PROCEDURE — 99213 OFFICE O/P EST LOW 20 MIN: CPT | Performed by: PHYSICIAN ASSISTANT

## 2024-01-08 PROCEDURE — 96372 THER/PROPH/DIAG INJ SC/IM: CPT | Performed by: PHYSICIAN ASSISTANT

## 2024-01-08 RX ORDER — SEMAGLUTIDE 0.25 MG/.5ML
INJECTION, SOLUTION SUBCUTANEOUS WEEKLY
COMMUNITY

## 2024-01-08 RX ORDER — DEXTROMETHORPHAN HYDROBROMIDE AND PROMETHAZINE HYDROCHLORIDE 15; 6.25 MG/5ML; MG/5ML
SYRUP ORAL
Qty: 180 ML | Refills: 1 | Status: SHIPPED | OUTPATIENT
Start: 2024-01-08

## 2024-01-08 RX ORDER — DULOXETIN HYDROCHLORIDE 60 MG/1
60 CAPSULE, DELAYED RELEASE ORAL NIGHTLY
Qty: 90 CAPSULE | Refills: 1 | Status: CANCELLED | OUTPATIENT
Start: 2024-01-08

## 2024-01-08 RX ORDER — DEXTROMETHORPHAN HYDROBROMIDE AND PROMETHAZINE HYDROCHLORIDE 15; 6.25 MG/5ML; MG/5ML
SYRUP ORAL AS NEEDED
COMMUNITY
End: 2024-01-08 | Stop reason: SDUPTHER

## 2024-01-08 RX ORDER — ALBUTEROL SULFATE 90 UG/1
1 AEROSOL, METERED RESPIRATORY (INHALATION) AS NEEDED
COMMUNITY
Start: 2023-12-26

## 2024-01-08 RX ORDER — TRIAMCINOLONE ACETONIDE 40 MG/ML
80 INJECTION, SUSPENSION INTRA-ARTICULAR; INTRAMUSCULAR ONCE
Status: COMPLETED | OUTPATIENT
Start: 2024-01-08 | End: 2024-01-08

## 2024-01-08 RX ADMIN — TRIAMCINOLONE ACETONIDE 80 MG: 40 INJECTION, SUSPENSION INTRA-ARTICULAR; INTRAMUSCULAR at 15:46

## 2024-01-08 NOTE — ASSESSMENT & PLAN NOTE
Patient provided with steroid injection this date.  Prescribed Promethazine DM and advised her to use albuterol inhaler more often.  Increase fluids as tolerated.  Call if any problems arise.

## 2024-01-08 NOTE — PROGRESS NOTES
"Chief Complaint  Shortness of Breath (Gets worse when she gets up. She had the flu and went to ER due to not being able to stop coughing. She was told she had a respiratory infection), Cough, and Fatigue (Can't be up and moving for long periods of time)    Subjective        Grace Jacob presents to [facility]  History of Present Illness  Patient reports today secondary to having a cough and feeling bad for 3 weeks.  Patient reports 3 weeks ago she was diagnosed with flu reports she started to feel better however the cough continued and she started to have sticky mucus so she was prescribed an antibiotic.  Reports her mucus has improved however the cough is constant.  Reports it keeps her awake and she is unable to rest.  Patient reports fatigue and this morning some fullness in her ears.  Patient reports she has been taking emergen-C.  Patient reports being prescribed an albuterol inhaler however she has not used it.  Patient denies any fever or chills.  Shortness of Breath    Cough  Associated symptoms include shortness of breath.   Fatigue  Associated symptoms include coughing and fatigue.       Objective   Vital Signs:  /88   Pulse 67   Temp 96.9 °F (36.1 °C) (Infrared)   Ht 170.2 cm (67\")   Wt 113 kg (249 lb 9.6 oz)   SpO2 99%   BMI 39.09 kg/m²   Estimated body mass index is 39.09 kg/m² as calculated from the following:    Height as of this encounter: 170.2 cm (67\").    Weight as of this encounter: 113 kg (249 lb 9.6 oz).               Physical Exam  Vitals and nursing note reviewed.   Constitutional:       General: She is not in acute distress.     Appearance: Normal appearance.   HENT:      Head: Normocephalic.      Right Ear: Hearing, tympanic membrane and ear canal normal.      Left Ear: Hearing, tympanic membrane and ear canal normal.      Mouth/Throat:      Pharynx: Posterior oropharyngeal erythema present. No oropharyngeal exudate.   Eyes:      Pupils: " Pupils are equal, round, and reactive to light.   Cardiovascular:      Rate and Rhythm: Normal rate and regular rhythm.   Pulmonary:      Effort: Pulmonary effort is normal. No respiratory distress.   Skin:     General: Skin is warm and dry.   Neurological:      Mental Status: She is alert.   Psychiatric:         Mood and Affect: Mood normal.        Result Review :                   Assessment and Plan   Diagnoses and all orders for this visit:    1. Acute nasopharyngitis (Primary)  Assessment & Plan:  Patient provided with steroid injection this date.  Prescribed Promethazine DM and advised her to use albuterol inhaler more often.  Increase fluids as tolerated.  Call if any problems arise.    Orders:  -     triamcinolone acetonide (KENALOG-40) injection 80 mg  -     promethazine-dextromethorphan (PROMETHAZINE-DM) 6.25-15 MG/5ML syrup; Take 5-7.5ml TID as needed for cough, caution sedation  Dispense: 180 mL; Refill: 1    2. Bronchospasm, acute  Assessment & Plan:  See plan above               Follow Up   No follow-ups on file.  Patient was given instructions and counseling regarding her condition or for health maintenance advice. Please see specific information pulled into the AVS if appropriate.

## 2024-01-26 DIAGNOSIS — R73.03 PREDIABETES: ICD-10-CM

## 2024-01-26 DIAGNOSIS — E66.01 MORBID (SEVERE) OBESITY DUE TO EXCESS CALORIES: Primary | ICD-10-CM

## 2024-03-04 RX ORDER — VALACYCLOVIR HYDROCHLORIDE 500 MG/1
500 TABLET, FILM COATED ORAL DAILY
Qty: 90 TABLET | Refills: 3 | Status: CANCELLED | OUTPATIENT
Start: 2024-03-04

## 2024-03-04 NOTE — TELEPHONE ENCOUNTER
Caller: Cecil Gracemaya Pop    Relationship: Self    Best call back number: [phone]    Requested Prescriptions:   Requested Prescriptions     Pending Prescriptions Disp Refills    valACYclovir (VALTREX) 500 MG tablet 90 tablet 3     Sig: Take 1 tablet by mouth Daily.        Pharmacy where request should be sent: [id number] [place], [place] - [address] [place] [address] [address] [facility] [address]  [address] [facility] - [phone] [facility] [phone] FX     Last office visit with prescribing clinician: 11/2/2023   Last telemedicine visit with prescribing clinician: Visit date not found   Next office visit with prescribing clinician: 11/7/2024     Additional details provided by patient:   PT IS CURRENTLY TOTALLY OUT    Does the patient have less than a 3 day supply:  [x] Yes  [] No    Would you like a call back once the refill request has been completed: [x] Yes [] No    If the office needs to give you a call back, can they leave a voicemail: [x] Yes [] No    Pennie Gilbert Rep   03/04/24 10:39 EST

## 2024-03-05 ENCOUNTER — TELEPHONE (OUTPATIENT)
Dept: OBSTETRICS AND GYNECOLOGY | Facility: CLINIC | Age: 59
End: 2024-03-05
Payer: COMMERCIAL

## 2024-03-05 DIAGNOSIS — B00.9 HERPES: Primary | ICD-10-CM

## 2024-03-05 RX ORDER — VALACYCLOVIR HYDROCHLORIDE 500 MG/1
500 TABLET, FILM COATED ORAL DAILY
Qty: 90 TABLET | Refills: 2 | Status: SHIPPED | OUTPATIENT
Start: 2024-03-05

## 2024-03-05 RX ORDER — VALACYCLOVIR HYDROCHLORIDE 500 MG/1
500 TABLET, FILM COATED ORAL DAILY
Qty: 30 TABLET | OUTPATIENT
Start: 2024-03-05

## 2024-03-05 NOTE — TELEPHONE ENCOUNTER
Returned patient's call. Takes Valtrex 500 mg once daily for HSV suppression. Last Rx 2/13/23. Did not refill at last visit here in November 2023. Next visit is 11/7/24. Refills sent to her pharmacy. She v/u and agreed.

## 2024-04-15 ENCOUNTER — OFFICE VISIT (OUTPATIENT)
Dept: FAMILY MEDICINE CLINIC | Facility: CLINIC | Age: 59
End: 2024-04-15
Payer: COMMERCIAL

## 2024-04-15 VITALS
HEART RATE: 75 BPM | WEIGHT: 253 LBS | OXYGEN SATURATION: 97 % | DIASTOLIC BLOOD PRESSURE: 80 MMHG | TEMPERATURE: 96.3 F | BODY MASS INDEX: 39.63 KG/M2 | SYSTOLIC BLOOD PRESSURE: 116 MMHG

## 2024-04-15 DIAGNOSIS — B37.2 CANDIDAL INTERTRIGO: ICD-10-CM

## 2024-04-15 DIAGNOSIS — N39.3 STRESS INCONTINENCE: ICD-10-CM

## 2024-04-15 DIAGNOSIS — R39.15 URGENCY OF URINATION: Primary | ICD-10-CM

## 2024-04-15 PROBLEM — R35.0 URINARY FREQUENCY: Status: ACTIVE | Noted: 2024-04-15

## 2024-04-15 PROBLEM — R30.0 DYSURIA: Status: ACTIVE | Noted: 2024-04-15

## 2024-04-15 LAB
BILIRUB BLD-MCNC: NEGATIVE MG/DL
CLARITY, POC: ABNORMAL
COLOR UR: YELLOW
EXPIRATION DATE: ABNORMAL
GLUCOSE UR STRIP-MCNC: NEGATIVE MG/DL
KETONES UR QL: ABNORMAL
LEUKOCYTE EST, POC: ABNORMAL
Lab: ABNORMAL
NITRITE UR-MCNC: POSITIVE MG/ML
PH UR: 6 [PH] (ref 5–8)
PROT UR STRIP-MCNC: ABNORMAL MG/DL
RBC # UR STRIP: ABNORMAL /UL
SP GR UR: 1.02 (ref 1–1.03)
UROBILINOGEN UR QL: ABNORMAL

## 2024-04-15 PROCEDURE — 81003 URINALYSIS AUTO W/O SCOPE: CPT | Performed by: PHYSICIAN ASSISTANT

## 2024-04-15 PROCEDURE — 99214 OFFICE O/P EST MOD 30 MIN: CPT | Performed by: PHYSICIAN ASSISTANT

## 2024-04-15 RX ORDER — SULFAMETHOXAZOLE AND TRIMETHOPRIM 800; 160 MG/1; MG/1
1 TABLET ORAL 2 TIMES DAILY
Qty: 14 TABLET | Refills: 0 | Status: SHIPPED | OUTPATIENT
Start: 2024-04-15

## 2024-04-15 RX ORDER — OXYBUTYNIN CHLORIDE 15 MG/1
15 TABLET, EXTENDED RELEASE ORAL DAILY
Qty: 90 TABLET | Refills: 0 | Status: SHIPPED | OUTPATIENT
Start: 2024-04-15

## 2024-04-15 RX ORDER — FLUCONAZOLE 150 MG/1
TABLET ORAL
Qty: 3 TABLET | Refills: 1 | Status: SHIPPED | OUTPATIENT
Start: 2024-04-15

## 2024-04-15 RX ORDER — NAPROXEN 375 MG/1
375 TABLET ORAL EVERY 12 HOURS SCHEDULED
COMMUNITY

## 2024-04-15 RX ORDER — BACITRACIN ZINC AND POLYMYXIN B SULFATE 500; 10000 [USP'U]/G; [USP'U]/G
OINTMENT OPHTHALMIC
COMMUNITY
Start: 2024-03-08

## 2024-04-15 RX ORDER — NYSTATIN 100000 U/G
1 CREAM TOPICAL AS NEEDED
Qty: 30 G | Refills: 10 | Status: SHIPPED | OUTPATIENT
Start: 2024-04-15

## 2024-04-15 NOTE — PROGRESS NOTES
"Chief Complaint  Urinary Tract Infection    Subjective        Grace Jacob presents to [facility]  History of Present Illness  Patient reports today secondary to having increased frequency and urgency of urination for the past 3 days.  Patient reports having stress incontinence however states she is needed to urinate more often and believes she has UTI.  Patient reports a few years ago it was suggested by urology that she have a bladder lift.  Patient states she was provided with medication and her symptoms subsided however patient reports they restarted around 5 months ago.  Patient states she has to get up to urinate 4-5 times a night states she at times does not make it to the restroom.  Patient states she urinates when she coughs, laughs or yells.    Patient reports having yeast infection underneath her stomach and breast would like refill of nystatin powder.  Urinary Tract Infection         Objective   Vital Signs:  /80 (BP Location: Left arm, Patient Position: Sitting)   Pulse 75   Temp 96.3 °F (35.7 °C) (Temporal)   Wt 115 kg (253 lb)   SpO2 97%   BMI 39.63 kg/m²   Estimated body mass index is 39.63 kg/m² as calculated from the following:    Height as of 1/8/24: 170.2 cm (67\").    Weight as of this encounter: 115 kg (253 lb).               Physical Exam  Vitals and nursing note reviewed.   Constitutional:       General: She is not in acute distress.     Appearance: She is not ill-appearing.   HENT:      Mouth/Throat:      Pharynx: No oropharyngeal exudate.   Eyes:      Pupils: Pupils are equal, round, and reactive to light.   Cardiovascular:      Rate and Rhythm: Normal rate and regular rhythm.   Pulmonary:      Effort: Pulmonary effort is normal. No respiratory distress.   Abdominal:      Tenderness: There is no right CVA tenderness or left CVA tenderness.   Musculoskeletal:         General: Normal range of motion.   Skin:     General: Skin is warm and dry. "   Psychiatric:         Mood and Affect: Mood normal.        Result Review :                     Assessment and Plan     Diagnoses and all orders for this visit:    1. Urgency of urination (Primary)  Assessment & Plan:  Patient UA was positive this date we will start her on Bactrim and advised patient to increase fluids.  Call if any problems arise    Orders:  -     POCT urinalysis dipstick, automated  -     Urine Culture - Urine, Urine, Clean Catch; Future  -     Urine Culture - Urine, Urine, Clean Catch  -     sulfamethoxazole-trimethoprim (Bactrim DS) 800-160 MG per tablet; Take 1 tablet by mouth 2 (Two) Times a Day.  Dispense: 14 tablet; Refill: 0    2. Candidal intertrigo  Assessment & Plan:  Refill patient's medication advised her to keep the area clean and dry call if any problems arise    Orders:  -     nystatin (MYCOSTATIN) 947738 UNIT/GM cream; Apply 1 Application topically to the appropriate area as directed As Needed (for yeast).  Dispense: 30 g; Refill: 10  -     nystatin-triamcinolone (MYCOLOG II) 551677-6.1 UNIT/GM-% cream; Apply 1 Application topically to the appropriate area as directed 2 (Two) Times a Day.  Dispense: 30 g; Refill: 5  -     fluconazole (Diflucan) 150 MG tablet; Take 1 tab po every other day for yeast  Dispense: 3 tablet; Refill: 1    3. Stress incontinence  Assessment & Plan:  Will restart patient's oxybutynin however advised that she will likely need to follow-up with urology for surgical assessment.    Orders:  -     oxybutynin XL (DITROPAN XL) 15 MG 24 hr tablet; Take 1 tablet by mouth Daily. For overactive bladder  Dispense: 90 tablet; Refill: 0             Follow Up     Return if symptoms worsen or fail to improve.  Patient was given instructions and counseling regarding her condition or for health maintenance advice. Please see specific information pulled into the AVS if appropriate.

## 2024-04-15 NOTE — ASSESSMENT & PLAN NOTE
Patient UA was positive this date we will start her on Bactrim and advised patient to increase fluids.  Call if any problems arise

## 2024-04-21 DIAGNOSIS — I10 PRIMARY HYPERTENSION: ICD-10-CM

## 2024-04-23 DIAGNOSIS — F32.A MILD DEPRESSION: ICD-10-CM

## 2024-04-23 DIAGNOSIS — M19.90 CHRONIC ARTHRITIS: ICD-10-CM

## 2024-04-23 RX ORDER — DULOXETIN HYDROCHLORIDE 60 MG/1
CAPSULE, DELAYED RELEASE ORAL
Qty: 90 CAPSULE | Refills: 1 | Status: SHIPPED | OUTPATIENT
Start: 2024-04-23

## 2024-04-26 ENCOUNTER — TELEPHONE (OUTPATIENT)
Dept: FAMILY MEDICINE CLINIC | Facility: CLINIC | Age: 59
End: 2024-04-26
Payer: COMMERCIAL

## 2024-04-30 RX ORDER — AMLODIPINE BESYLATE 5 MG/1
5 TABLET ORAL NIGHTLY
Qty: 90 TABLET | Refills: 1 | Status: SHIPPED | OUTPATIENT
Start: 2024-04-30

## 2024-04-30 RX ORDER — METOPROLOL SUCCINATE 25 MG/1
25 TABLET, EXTENDED RELEASE ORAL DAILY
Qty: 90 TABLET | Refills: 1 | Status: SHIPPED | OUTPATIENT
Start: 2024-04-30

## 2024-05-22 ENCOUNTER — OFFICE VISIT (OUTPATIENT)
Dept: FAMILY MEDICINE CLINIC | Facility: CLINIC | Age: 59
End: 2024-05-22
Payer: COMMERCIAL

## 2024-05-22 VITALS
OXYGEN SATURATION: 97 % | HEIGHT: 67 IN | WEIGHT: 245 LBS | SYSTOLIC BLOOD PRESSURE: 100 MMHG | DIASTOLIC BLOOD PRESSURE: 62 MMHG | BODY MASS INDEX: 38.45 KG/M2 | HEART RATE: 95 BPM

## 2024-05-22 DIAGNOSIS — J02.9 SORE THROAT: ICD-10-CM

## 2024-05-22 DIAGNOSIS — J01.00 ACUTE NON-RECURRENT MAXILLARY SINUSITIS: Primary | ICD-10-CM

## 2024-05-22 DIAGNOSIS — B37.2 CANDIDAL INTERTRIGO: ICD-10-CM

## 2024-05-22 LAB
EXPIRATION DATE: NORMAL
EXPIRATION DATE: NORMAL
FLUAV AG UPPER RESP QL IA.RAPID: NOT DETECTED
FLUBV AG UPPER RESP QL IA.RAPID: NOT DETECTED
INTERNAL CONTROL: NORMAL
INTERNAL CONTROL: NORMAL
Lab: NORMAL
Lab: NORMAL
S PYO AG THROAT QL: NEGATIVE
SARS-COV-2 AG UPPER RESP QL IA.RAPID: NOT DETECTED

## 2024-05-22 PROCEDURE — 87880 STREP A ASSAY W/OPTIC: CPT | Performed by: PHYSICIAN ASSISTANT

## 2024-05-22 PROCEDURE — 99213 OFFICE O/P EST LOW 20 MIN: CPT | Performed by: PHYSICIAN ASSISTANT

## 2024-05-22 PROCEDURE — 87428 SARSCOV & INF VIR A&B AG IA: CPT | Performed by: PHYSICIAN ASSISTANT

## 2024-05-22 RX ORDER — TIRZEPATIDE 7.5 MG/.5ML
7.5 INJECTION, SOLUTION SUBCUTANEOUS WEEKLY
COMMUNITY
Start: 2024-05-13

## 2024-05-22 RX ORDER — ASPIRIN 81 MG/1
81 TABLET ORAL DAILY
COMMUNITY
End: 2024-05-22

## 2024-05-22 RX ORDER — METHYLPREDNISOLONE 4 MG/1
TABLET ORAL
Qty: 19 TABLET | Refills: 0 | Status: SHIPPED | OUTPATIENT
Start: 2024-05-22 | End: 2024-06-01

## 2024-05-22 RX ORDER — FLUCONAZOLE 150 MG/1
TABLET ORAL
Qty: 3 TABLET | Refills: 1 | Status: SHIPPED | OUTPATIENT
Start: 2024-05-22

## 2024-05-22 RX ORDER — FLUTICASONE PROPIONATE 50 MCG
2 SPRAY, SUSPENSION (ML) NASAL DAILY
Qty: 11.1 ML | Refills: 5 | Status: SHIPPED | OUTPATIENT
Start: 2024-05-22

## 2024-05-22 RX ORDER — AZITHROMYCIN 250 MG/1
TABLET, FILM COATED ORAL
Qty: 6 TABLET | Refills: 0 | Status: SHIPPED | OUTPATIENT
Start: 2024-05-22 | End: 2024-05-24 | Stop reason: SDUPTHER

## 2024-05-22 RX ORDER — CLINDAMYCIN HYDROCHLORIDE 150 MG/1
150 CAPSULE ORAL DAILY
COMMUNITY
End: 2024-05-22

## 2024-05-22 RX ORDER — IBUPROFEN 800 MG/1
800 TABLET ORAL EVERY 6 HOURS PRN
COMMUNITY
Start: 2024-04-26

## 2024-05-22 RX ORDER — CELECOXIB 100 MG/1
100 CAPSULE ORAL 2 TIMES DAILY
COMMUNITY
End: 2024-05-22

## 2024-05-22 NOTE — ASSESSMENT & PLAN NOTE
Patient negative for COVID, strep and flu.  Patient prescribed Azithromycin, steroid taper and promethazine DM.  Increase fluids as tolerated.  Discussed signs of worsening symptoms and advised ER should they occur

## 2024-05-22 NOTE — PROGRESS NOTES
"Chief Complaint  Sore Throat (Started 3-4 days ) and Fatigue    Subjective        Grace Jacob presents to [facility]  History of Present Illness  Patient reports today secondary to having sore throat, fatigue and sinus pressure for the past 2 weeks.  States it has increased over the pass 4 days.  Patient denies any fever or chills.  Denies any SOB or difficulty breathing  Sore Throat     Fatigue  Associated symptoms include fatigue and a sore throat.       Objective   Vital Signs:  /62   Pulse 95   Ht 170.2 cm (67\")   Wt 111 kg (245 lb)   SpO2 97%   BMI 38.37 kg/m²   Estimated body mass index is 38.37 kg/m² as calculated from the following:    Height as of this encounter: 170.2 cm (67\").    Weight as of this encounter: 111 kg (245 lb).               Physical Exam  Vitals and nursing note reviewed.   Constitutional:       General: She is not in acute distress.     Appearance: She is not ill-appearing.   HENT:      Head: Normocephalic.      Right Ear: Tympanic membrane, ear canal and external ear normal.      Left Ear: Tympanic membrane, ear canal and external ear normal.      Nose: Congestion present.      Right Turbinates: Swollen.      Left Turbinates: Swollen.      Right Sinus: Maxillary sinus tenderness present.      Left Sinus: Maxillary sinus tenderness present.      Mouth/Throat:      Pharynx: Posterior oropharyngeal erythema present. No oropharyngeal exudate.   Eyes:      Pupils: Pupils are equal, round, and reactive to light.   Cardiovascular:      Rate and Rhythm: Normal rate and regular rhythm.   Pulmonary:      Effort: Pulmonary effort is normal. No respiratory distress.   Musculoskeletal:         General: Normal range of motion.   Skin:     General: Skin is warm and dry.   Psychiatric:         Mood and Affect: Mood normal.        Result Review :                     Assessment and Plan     Diagnoses and all orders for this visit:    1. Acute " non-recurrent maxillary sinusitis (Primary)  Assessment & Plan:  Patient negative for COVID, strep and flu.  Patient prescribed Azithromycin, steroid taper and promethazine DM.  Increase fluids as tolerated.  Discussed signs of worsening symptoms and advised ER should they occur    Orders:  -     azithromycin (Zithromax Z-Arcenio) 250 MG tablet; Take 2 tablets by mouth on day 1, then 1 tablet daily on days 2-5  Dispense: 6 tablet; Refill: 0  -     fluticasone (FLONASE) 50 MCG/ACT nasal spray; 2 sprays into the nostril(s) as directed by provider Daily.  Dispense: 11.1 mL; Refill: 5  -     methylPREDNISolone (MEDROL) 4 MG tablet; Take 3 tablets by mouth Daily for 3 days, THEN 2 tablets Daily for 3 days, THEN 1 tablet Daily for 4 days. With food  Dispense: 19 tablet; Refill: 0    2. Candidal intertrigo  Assessment & Plan:  Refilled diflucan    Orders:  -     fluconazole (Diflucan) 150 MG tablet; Take 1 tab po every other day for yeast  Dispense: 3 tablet; Refill: 1    3. Sore throat  -     POCT rapid strep A  -     POCT SARS-CoV-2 Antigen COURT + Flu             Follow Up     Return if symptoms worsen or fail to improve.  Patient was given instructions and counseling regarding her condition or for health maintenance advice. Please see specific information pulled into the AVS if appropriate.

## 2024-05-24 ENCOUNTER — TELEPHONE (OUTPATIENT)
Dept: FAMILY MEDICINE CLINIC | Facility: CLINIC | Age: 59
End: 2024-05-24
Payer: COMMERCIAL

## 2024-05-24 DIAGNOSIS — J01.00 ACUTE NON-RECURRENT MAXILLARY SINUSITIS: ICD-10-CM

## 2024-05-24 RX ORDER — AZITHROMYCIN 250 MG/1
250 TABLET, FILM COATED ORAL DAILY
Qty: 3 TABLET | Refills: 0 | Status: SHIPPED | OUTPATIENT
Start: 2024-05-24

## 2024-05-24 NOTE — TELEPHONE ENCOUNTER
Caller: Grace Jacob    Relationship: Self    Best call back number: [phone]    Which medication are you concerned about: AZITHROMYCIN 250 MG TABLET    Who prescribed you this medication: CANDIE FONTENOT    When did you start taking this medication: THIS WEEK    What are your concerns: PATIENT STATES THAT HER DOG CHEWED THE REMAINING 3 TABLETS AND PATIENT IS REQUESTING A NEW PRESCRIPTION OR IF THERE ARE ANY SAMPLES.   PLEASE ADVISE    How long have you had these concerns: TODAY    Additional Notes: PATIENT IS REQUESTING A CALL BACK EITHER WAY

## 2024-05-30 ENCOUNTER — TELEPHONE (OUTPATIENT)
Dept: FAMILY MEDICINE CLINIC | Facility: CLINIC | Age: 59
End: 2024-05-30

## 2024-05-30 NOTE — TELEPHONE ENCOUNTER
Caller: Grace Jacob    Relationship: Self    Best call back number: [phone]     What form or medical record are you requesting: LETTER FROM PROVIDER STATING THE PATIENT CAN WORK FROM HOME BECAUSE HER IMMUNE SYSTEM IS VERY WEAK.     Who is requesting this form or medical record from you: PATIENT     How would you like to receive the form or medical records (pick-up, mail, fax): PICK-UP  If fax, what is the fax number:   If mail, what is the address:   If pick-up, provide patient with address and location details    Timeframe paperwork needed: AS SOON AS POSSIBLE     Additional notes: PATIENTS EMPLOYER GIVES THEM THE OPTION TO WORK FROM HOME IF NEEDED AND SHE WAS JUST IN THE HOSPITAL WITH PNEUMONIA AND HER IMMUNE SYSTEM IS VERY WEAK AND SHE WOULD LIKE A LETTER STATING SHE NEEDS TO WORK FROM HOME.

## 2024-07-12 DIAGNOSIS — N39.3 STRESS INCONTINENCE: ICD-10-CM

## 2024-07-12 RX ORDER — OXYBUTYNIN CHLORIDE 15 MG/1
15 TABLET, EXTENDED RELEASE ORAL DAILY
Qty: 90 TABLET | Refills: 0 | Status: SHIPPED | OUTPATIENT
Start: 2024-07-12

## 2024-07-26 RX ORDER — TIRZEPATIDE 2.5 MG/.5ML
INJECTION, SOLUTION SUBCUTANEOUS
Qty: 6 ML | Refills: 1 | Status: SHIPPED | OUTPATIENT
Start: 2024-07-26

## 2024-08-20 ENCOUNTER — TELEPHONE (OUTPATIENT)
Dept: OBSTETRICS AND GYNECOLOGY | Facility: CLINIC | Age: 59
End: 2024-08-20
Payer: COMMERCIAL

## 2024-08-20 NOTE — TELEPHONE ENCOUNTER
Caller: Grace Jacob    Relationship: Self    Best call back number: [phone]      Requested Prescriptions: VALCYCLOVIR   Requested Prescriptions      No prescriptions requested or ordered in this encounter        Pharmacy where request should be sent:    [facility]   Last office visit with prescribing clinician: 11/2/2023   Last telemedicine visit with prescribing clinician: Visit date not found   Next office visit with prescribing clinician: 11/7/2024     Additional details provided by patient:     Does the patient have less than a 3 day supply:  [x] Yes  [] No    Would you like a call back once the refill request has been completed: [] Yes [x] No    If the office needs to give you a call back, can they leave a voicemail: [] Yes [x] No    Pennie Vigil Rep   08/20/24 15:48 EDT

## 2024-08-20 NOTE — TELEPHONE ENCOUNTER
Returned patient's call.   She is requesting refill of Valtrex.   RX was given 03/05/24, #90 with 2 refills.  Advised her that she should have one refill left.  States she had the Rx transferred to a different pharmacy and was told there are no refills.  She will check with the pharmacies again and call us back if she needs further assistance.

## 2024-10-25 ENCOUNTER — TRANSCRIBE ORDERS (OUTPATIENT)
Dept: ADMINISTRATIVE | Facility: HOSPITAL | Age: 59
End: 2024-10-25
Payer: COMMERCIAL

## 2024-10-25 DIAGNOSIS — Z12.31 VISIT FOR SCREENING MAMMOGRAM: Primary | ICD-10-CM

## 2024-11-07 ENCOUNTER — OFFICE VISIT (OUTPATIENT)
Dept: OBSTETRICS AND GYNECOLOGY | Facility: CLINIC | Age: 59
End: 2024-11-07
Payer: COMMERCIAL

## 2024-11-07 VITALS — DIASTOLIC BLOOD PRESSURE: 70 MMHG | BODY MASS INDEX: 39.47 KG/M2 | SYSTOLIC BLOOD PRESSURE: 118 MMHG | WEIGHT: 252 LBS

## 2024-11-07 DIAGNOSIS — Z01.419 PAP TEST, AS PART OF ROUTINE GYNECOLOGICAL EXAMINATION: Primary | ICD-10-CM

## 2024-11-07 DIAGNOSIS — Z01.419 WOMEN'S ANNUAL ROUTINE GYNECOLOGICAL EXAMINATION: ICD-10-CM

## 2024-11-07 PROCEDURE — 99396 PREV VISIT EST AGE 40-64: CPT | Performed by: OBSTETRICS & GYNECOLOGY

## 2024-11-07 NOTE — PROGRESS NOTES
Gynecologic Annual Exam Note        GYN Annual Exam     CC - Here for annual exam.        HPI  Grace Jacob is a 59 y.o. female, , who presents for annual well woman exam as a established patient.  She is postmenopausal.. Denies vaginal bleeding.   There were no changes to her medical or surgical history since her last visit. Marital Status: .  She is sexually active. She has not had new partners.. STD testing recommendations have been explained to the patient and she declines STD testing.    The patient would like to discuss the following complaints today: nothing    Additional OB/GYN History   On HRT? No    Last Pap : 23. Results: negative. HPV:  not done .   Last Completed Pap Smear       This patient has no relevant Health Maintenance data.          History of abnormal Pap smear: no  Family history of uterine, colon, breast, or ovarian cancer: no  Performs monthly Self-Breast Exam: no  Last mammogram: 23. Done at . There is a copy in the chart.    Last Completed Mammogram       This patient has no relevant Health Maintenance data.          Last colonoscopy: has had a colonoscopy 9 years ago    Last Completed Colonoscopy            COLORECTAL CANCER SCREENING (COLONOSCOPY - Every 10 Years) Next due on 2025  SCANNED - COLONOSCOPY    2014  COLONOSCOPY (Done - negative)                    She has had a DEXA but unsure when, results were normal.   Exercises Regularly: no  Feelings of Anxiety or Depression: no      Tobacco Usage?: No       Current Outpatient Medications:     DULoxetine (CYMBALTA) 60 MG capsule, TAKE 1 CAPSULE BY MOUTH EVERY NIGHT FOR MOOD OR PAIN, Disp: 90 capsule, Rfl: 1    fluticasone (FLONASE) 50 MCG/ACT nasal spray, 2 sprays into the nostril(s) as directed by provider Daily., Disp: 11.1 mL, Rfl: 5    hydroCHLOROthiazide (HYDRODIURIL) 25 MG tablet, Take 1 tablet by mouth Daily. As needed for swelling, Disp: 90 tablet, Rfl: 1     ibuprofen (ADVIL,MOTRIN) 800 MG tablet, Take 1 tablet by mouth Every 6 (Six) Hours As Needed., Disp: , Rfl:     metoprolol succinate XL (TOPROL-XL) 25 MG 24 hr tablet, TAKE 1 TABLET BY MOUTH DAILY FOR BLOOD PRESSURE, Disp: 90 tablet, Rfl: 1    Mounjaro 7.5 MG/0.5ML solution pen-injector pen, Inject 0.5 mL under the skin into the appropriate area as directed 1 (One) Time Per Week., Disp: , Rfl:     naproxen (NAPROSYN) 375 MG tablet, Take 1 tablet by mouth Every 12 (Twelve) Hours., Disp: , Rfl:     nystatin (MYCOSTATIN) 685755 UNIT/GM cream, Apply 1 Application topically to the appropriate area as directed As Needed (for yeast)., Disp: 30 g, Rfl: 10    nystatin-triamcinolone (MYCOLOG II) 766596-2.1 UNIT/GM-% cream, Apply 1 Application topically to the appropriate area as directed 2 (Two) Times a Day., Disp: 30 g, Rfl: 5    promethazine-dextromethorphan (PROMETHAZINE-DM) 6.25-15 MG/5ML syrup, Take 5-7.5ml TID as needed for cough, caution sedation, Disp: 180 mL, Rfl: 1    Tirzepatide (Mounjaro) 2.5 MG/0.5ML solution pen-injector pen, INJECT 2.5MG UNDER SKIN AS DIRECTED ONCE WEEKLY FOR 30 DAYS. THEN 5MG 1 TIME PER WEEK FOR 30 DAYS. THEN 7.5 1 TIME PER WEEK FOR 60 DAYS, Disp: 6 mL, Rfl: 1    Tirzepatide-Weight Management (ZEPBOUND) 2.5 MG/0.5ML solution auto-injector, Inject 0.5 mL under the skin into the appropriate area as directed 1 (One) Time Per Week., Disp: 2 mL, Rfl: 0    Tirzepatide-Weight Management (ZEPBOUND) 5 MG/0.5ML solution auto-injector, Inject 0.5 mL under the skin into the appropriate area as directed 1 (One) Time Per Week., Disp: 2 mL, Rfl: 0    Tirzepatide-Weight Management (ZEPBOUND) 7.5 MG/0.5ML solution auto-injector, Inject 0.5 mL under the skin into the appropriate area as directed 1 (One) Time Per Week., Disp: 2 mL, Rfl: 3    traMADol (ULTRAM) 50 MG tablet, Take 1 tablet by mouth Every 12 (Twelve) Hours As Needed., Disp: , Rfl:     valACYclovir (VALTREX) 500 MG tablet, Take 1 tablet by mouth  Daily., Disp: 90 tablet, Rfl: 2    Patient denies the need for medication refills today.    OB History          3    Para   2    Term   2            AB   1    Living             SAB   1    IAB        Ectopic        Molar        Multiple        Live Births   2                Past Medical History:   Diagnosis Date    Anxiety     Arthritis     On celebrex; steroid injections q3 months in back    Chronic pain     BILATERAL KNEE; on cymbalta.    DDD (degenerative disc disease), lumbar     on celebrex    Dysphagia     EGD - s/p esophageal dilatation    Fibroid     Frequent UTI     Gallbladder problem     GERD (gastroesophageal reflux disease)     Heartburn     chronic, episodic. Tums PRN. EGD . No hx of h pylori    Herpes     HTN (hypertension)     previously on lisinopril and Toprol    IBS (irritable bowel syndrome)     Left knee pain     Multiple gestation     STEPHANIE on CPAP     PONV (postoperative nausea and vomiting)     Preeclampsia     Pregnancy     S/P laparoscopic cholecystectomy     gallstones    Urinary incontinence     Wound of left leg     SCRAPPED ON BEDFRAME HALF OF A DIME IN SIZE. PT TO INFORM SURGEONS OFFICE        Past Surgical History:   Procedure Laterality Date    HIP ARTHROPLASTY Left     LAPAROSCOPIC CHOLECYSTECTOMY      gallstones    TOTAL HIP ARTHROPLASTY Right 10/19/2018    Procedure: RIGHT TOTAL  ANTERIOR HIP ARTHROPLASTY;  Surgeon: Mary Be MD;  Location: [facility];  Service: Orthopedics    TOTAL KNEE ARTHROPLASTY      bilateral    TOTAL KNEE ARTHROPLASTY REVISION Left 2021    Procedure: LEFT KNEE REVISION;  Surgeon: Mary Be MD;  Location: [facility] OR;  Service: Orthopedics;  Laterality: Left;    TOTAL KNEE ARTHROPLASTY REVISION Right 11/10/2023    Procedure: TOTAL KNEE ARTHROPLASTY REVISION;  Surgeon: Mary Be MD;  Location: [facility];  Service: Orthopedics;  Laterality: Right;       Health  Maintenance   Topic Date Due    TDAP/TD VACCINES (1 - Tdap) Never done    ZOSTER VACCINE (1 of 2) Never done    HEPATITIS C SCREENING  Never done    ANNUAL PHYSICAL  01/23/2024    INFLUENZA VACCINE  08/01/2024    COVID-19 Vaccine (1 - 2024-25 season) Never done    PAP SMEAR  11/02/2024    Annual Gynecologic Pelvic and Breast Exam  11/03/2024    MAMMOGRAM  12/06/2024    BMI FOLLOWUP  01/26/2025    COLORECTAL CANCER SCREENING  11/16/2025    Pneumococcal Vaccine 0-64  Aged Out       The additional following portions of the patient's history were reviewed and updated as appropriate: allergies, current medications, past family history, past medical history, past social history, and past surgical history.    Review of Systems    I have reviewed and agree with the HPI, ROS, and historical information as entered above. Redd Cavanaugh MD      Objective   /70   Wt 114 kg (252 lb)   BMI 39.47 kg/m²     Physical Exam  Vitals and nursing note reviewed. Exam conducted with a chaperone present.   Constitutional:       Appearance: She is well-developed.   HENT:      Head: Normocephalic and atraumatic.   Neck:      Thyroid: No thyroid mass or thyromegaly.   Cardiovascular:      Rate and Rhythm: Normal rate and regular rhythm.      Heart sounds: No murmur heard.  Pulmonary:      Effort: Pulmonary effort is normal. No retractions.      Breath sounds: Normal breath sounds. No wheezing, rhonchi or rales.   Chest:      Chest wall: No mass or tenderness.   Breasts:     Right: Normal. No mass, nipple discharge, skin change or tenderness.      Left: Normal. No mass, nipple discharge, skin change or tenderness.   Abdominal:      General: Bowel sounds are normal.      Palpations: Abdomen is soft. Abdomen is not rigid. There is no mass.      Tenderness: There is no abdominal tenderness. There is no guarding.      Hernia: No hernia is present. There is no hernia in the left inguinal area.   Genitourinary:     Labia:         Right: No  rash, tenderness or lesion.         Left: No rash, tenderness or lesion.       Vagina: Normal. No vaginal discharge or lesions.      Cervix: No cervical motion tenderness, discharge, lesion or cervical bleeding.      Uterus: Normal. Not enlarged, not fixed and not tender.       Adnexa:         Right: No mass or tenderness.          Left: No mass or tenderness.        Rectum: No external hemorrhoid.   Musculoskeletal:      Cervical back: Normal range of motion. No muscular tenderness.   Neurological:      Mental Status: She is alert and oriented to person, place, and time.   Psychiatric:         Behavior: Behavior normal.            Assessment and Plan    Problem List Items Addressed This Visit    None  Visit Diagnoses       Pap test, as part of routine gynecological examination    -  Primary    Women's annual routine gynecological examination        Relevant Orders    LIQUID-BASED PAP SMEAR WITH HPV GENOTYPING IF ASCUS ([facility])            GYN annual well woman exam.   Reviewed monthly self breast exams.  Instructed to call with lumps, pain, or breast discharge.  Yearly mammograms ordered.  Ordered mammogram today.  Reviewed exercise as a preventative health measures.   Reviewed BMI and weight loss as preventative health measures.   Colonoscopy recommended.  RTC in 1 year or PRN with problems.  Return in about 1 year (around 11/7/2025) for Annual physical in [facility] .         Redd Cavanaugh MD  11/07/2024

## 2024-11-08 LAB — REF LAB TEST METHOD: NORMAL

## 2024-11-22 DIAGNOSIS — B00.9 HERPES: ICD-10-CM

## 2024-11-25 ENCOUNTER — TELEPHONE (OUTPATIENT)
Dept: OBSTETRICS AND GYNECOLOGY | Facility: CLINIC | Age: 59
End: 2024-11-25
Payer: COMMERCIAL

## 2024-11-25 DIAGNOSIS — B00.9 HERPES: ICD-10-CM

## 2024-11-25 RX ORDER — VALACYCLOVIR HYDROCHLORIDE 500 MG/1
500 TABLET, FILM COATED ORAL DAILY
Qty: 90 TABLET | Refills: 2 | Status: SHIPPED | OUTPATIENT
Start: 2024-11-25

## 2024-11-25 RX ORDER — VALACYCLOVIR HYDROCHLORIDE 500 MG/1
500 TABLET, FILM COATED ORAL DAILY
Qty: 90 TABLET | Refills: 2 | OUTPATIENT
Start: 2024-11-25

## 2024-11-25 NOTE — TELEPHONE ENCOUNTER
Patient of Dr. Cavanaugh; LOV 11/07/24.  Returned patient's call.    She was requesting refill for Valtrex; also sent a refill request.   Informed her that refill has already been ordered. She v/u and agreed.

## 2024-12-09 ENCOUNTER — HOSPITAL ENCOUNTER (OUTPATIENT)
Dept: MAMMOGRAPHY | Facility: HOSPITAL | Age: 59
Discharge: HOME | End: 2024-12-09
Admitting: OBSTETRICS & GYNECOLOGY
Payer: COMMERCIAL

## 2024-12-09 DIAGNOSIS — Z12.31 VISIT FOR SCREENING MAMMOGRAM: ICD-10-CM

## 2024-12-09 LAB
NCCN CRITERIA FLAG: NORMAL
TYRER CUZICK SCORE: 8.8

## 2024-12-09 PROCEDURE — 77063 BREAST TOMOSYNTHESIS BI: CPT

## 2024-12-09 PROCEDURE — 77067 SCR MAMMO BI INCL CAD: CPT

## 2025-01-06 NOTE — ASSESSMENT & PLAN NOTE
Thank you   Thank you for trusting us with your healthcare needs. You may receive a survey regarding today's visit. It would help us out if you would take a few moments to provide your feedback. We value your input.  Please bring in ALL medications BOTTLES, including inhalers, herbal supplements, over the counter, prescribed & non-prescribed medicine. The office would like actual medication bottles and a list.         4.  Prior to getting your labs drawn, check with your insurance company for benefits and eligibility of lab services.  Often, insurance companies cover certain tests for preventative visits only.  It is patient's responsibility to    see what is covered.    5.  If the list below has been completed, PLEASE FAX RECORDS TO OUR OFFICE @ [phone]. Once the records have been received we will update your records at our office:  Health Maintenance Due   Topic Date Due    Flu vaccine (1) Never done    COVID-19 Vaccine (1 - 2023-24 season) Never done             Will restart patient's oxybutynin however advised that she will likely need to follow-up with urology for surgical assessment.

## 2025-02-25 DIAGNOSIS — J01.00 ACUTE NON-RECURRENT MAXILLARY SINUSITIS: ICD-10-CM

## 2025-02-25 NOTE — TELEPHONE ENCOUNTER
Requested Prescriptions:   Requested Prescriptions     Pending Prescriptions Disp Refills    fluticasone (FLONASE) 50 MCG/ACT nasal spray       Sig: Administer 2 sprays into the nostril(s) as directed by provider Daily.        Pharmacy where request should be sent: [facility] [id number] [facility] [address] - [phone]  - [phone]     Last office visit with prescribing clinician: 5/22/2024   Last telemedicine visit with prescribing clinician: Visit date not found   Next office visit with prescribing clinician: Visit date not found       Pennie Mayen Rep   02/25/25 08:24 EST

## 2025-02-26 RX ORDER — FLUTICASONE PROPIONATE 50 MCG
2 SPRAY, SUSPENSION (ML) NASAL DAILY
Qty: 16 G | Refills: 5 | Status: SHIPPED | OUTPATIENT
Start: 2025-02-26

## 2025-05-07 ENCOUNTER — TRANSCRIBE ORDERS (OUTPATIENT)
Dept: PHYSICAL THERAPY | Facility: HOSPITAL | Age: 60
End: 2025-05-07
Payer: COMMERCIAL

## 2025-05-07 DIAGNOSIS — I89.0 LYMPHEDEMA: Primary | ICD-10-CM

## 2025-05-29 DIAGNOSIS — B00.9 HERPES: ICD-10-CM

## 2025-05-29 RX ORDER — VALACYCLOVIR HYDROCHLORIDE 500 MG/1
500 TABLET, FILM COATED ORAL DAILY
Qty: 90 TABLET | Refills: 2 | Status: SHIPPED | OUTPATIENT
Start: 2025-05-29

## (undated) DEVICE — APPL CHLORAPREP W/TINT 26ML ORNG

## (undated) DEVICE — SUT VIC 0 CT1 36IN J946H

## (undated) DEVICE — TRAP FLD MINIVAC MEGADYNE 100ML

## (undated) DEVICE — BNDG ELAS ELITE V/CLOSE 6IN 5YD LF STRL

## (undated) DEVICE — OPTIFOAM GENTLE SA, POSTOP, 4X8: Brand: MEDLINE

## (undated) DEVICE — SYR CONTRL PRESS/LO FIX/M/LL W/THMB/RNG 10ML

## (undated) DEVICE — APPL CHLORAPREP HI/LITE 26ML ORNG

## (undated) DEVICE — PK KN TOTL 40

## (undated) DEVICE — OPTIFOAM GENTLE SA, POSTOP, 4X12: Brand: MEDLINE

## (undated) DEVICE — SKIN PREP TRAY W/CHG: Brand: MEDLINE INDUSTRIES, INC.

## (undated) DEVICE — SPNG GZ WOVN 4X4IN 12PLY 10/BX STRL

## (undated) DEVICE — NEEDLE, QUINCKE 22GX3.5": Brand: MEDLINE INDUSTRIES, INC.

## (undated) DEVICE — ANTIBACTERIAL UNDYED BRAIDED (POLYGLACTIN 910), SYNTHETIC ABSORBABLE SUTURE: Brand: COATED VICRYL

## (undated) DEVICE — PROXIMATE RH ROTATING HEAD SKIN STAPLERS (35 WIDE) CONTAINS 35 STAINLESS STEEL STAPLES: Brand: PROXIMATE

## (undated) DEVICE — DRAPE,U/ SHT,SPLIT,PLAS,STERIL: Brand: MEDLINE

## (undated) DEVICE — RECIPROCATING BLADE HEAVY DUTY LONG, OFFSET  (77.6 X 0.77 X 11.2MM)

## (undated) DEVICE — GLV SURG TRIUMPH CLASSIC PF LTX 8 STRL

## (undated) DEVICE — KT DRN EVAC WND PVC PCH WTROC RND 10F400

## (undated) DEVICE — COAXIAL FEMORAL CANAL TIP

## (undated) DEVICE — THIN OSTEOTOME BLADE 8MM X 5 IN: Brand: RENOVATION

## (undated) DEVICE — DRSNG SURESITE WNDW 4X4.5

## (undated) DEVICE — GLV SURG BIOGEL LTX PF 8

## (undated) DEVICE — THIN OFFSET (13.0 X 0.38 X 39.0MM)

## (undated) DEVICE — 3M™ STERI-STRIP™ REINFORCED ADHESIVE SKIN CLOSURES, R1547, 1/2 IN X 4 IN (12 MM X 100 MM), 6 STRIPS/ENVELOPE: Brand: 3M™ STERI-STRIP™

## (undated) DEVICE — 2108 SERIES SAGITTAL BLADE, NO OFFSET  (12.4 X 1.19 X 82.1MM)

## (undated) DEVICE — DRAPE,REIN 53X77,STERILE: Brand: MEDLINE

## (undated) DEVICE — GLV SURG SIGNATURE ESSENTIAL PF LTX SZ8

## (undated) DEVICE — SUT ETHIB 0 CT1 CR8 18IN CX21D

## (undated) DEVICE — PK ANT HIP 40

## (undated) DEVICE — 1010 S-DRAPE TOWEL DRAPE 10/BX: Brand: STERI-DRAPE™

## (undated) DEVICE — CONTAINER,SPECIMEN,OR STERILE,4OZ: Brand: MEDLINE

## (undated) DEVICE — 3M™ IOBAN™ 2 ANTIMICROBIAL INCISE DRAPE 6650EZ: Brand: IOBAN™ 2

## (undated) DEVICE — THIN OSTEOTOME BLADE 10MM X 3 IN: Brand: RENOVATION

## (undated) DEVICE — CEMENT MIXING SYSTEM WITH FEMORAL BREAKWAY NOZZLE: Brand: REVOLUTION

## (undated) DEVICE — IMMOB KN 3PNL DLX CANVS 22IN BLU

## (undated) DEVICE — PREP IM ENCHANCED TOTAL HIP BONE                                    PREPARATION KIT: Brand: PREP-IM

## (undated) DEVICE — SOL ISO/ALC 70PCT 4OZ

## (undated) DEVICE — SUT MNCRYL 3/0 PS2 18IN MCP497G

## (undated) DEVICE — SOL ISO/ALC RUB 70PCT 4OZ

## (undated) DEVICE — PIN HOLD TEMP NOHEAD FLUT 1/8X3.5IN

## (undated) DEVICE — TBG PENCL TELESCP MEGADYNE SMOKE EVAC 10FT

## (undated) DEVICE — SYR CONTRL LUERLOK 10CC

## (undated) DEVICE — SYS CLS SKIN PREMIERPRO EXOFINFUSION 22CM

## (undated) DEVICE — DRSNG WND GEL FIBR OPTICELL AG PLS W/SLV LF 4X5IN  STRL

## (undated) DEVICE — MAT FLR ABSORBENT LG 4FT 10 2.5FT

## (undated) DEVICE — GLV SURG PREMIERPRO ORTHO LTX PF SZ8 BRN

## (undated) DEVICE — DUAL CUT SAGITTAL BLADE

## (undated) DEVICE — ENCORE® LATEX ORTHO SIZE 8, STERILE LATEX POWDER-FREE SURGICAL GLOVE: Brand: ENCORE

## (undated) DEVICE — SHEET, DRAPE, SPLIT, STERILE: Brand: MEDLINE

## (undated) DEVICE — PENCL E/S ULTRAVAC TELESCP NOSE HOLSTR 10FT